# Patient Record
Sex: FEMALE | Race: WHITE | HISPANIC OR LATINO
[De-identification: names, ages, dates, MRNs, and addresses within clinical notes are randomized per-mention and may not be internally consistent; named-entity substitution may affect disease eponyms.]

---

## 2017-02-12 ENCOUNTER — RESULT REVIEW (OUTPATIENT)
Age: 37
End: 2017-02-12

## 2017-09-06 ENCOUNTER — RESULT REVIEW (OUTPATIENT)
Age: 37
End: 2017-09-06

## 2017-11-08 ENCOUNTER — RESULT REVIEW (OUTPATIENT)
Age: 37
End: 2017-11-08

## 2018-02-28 ENCOUNTER — RESULT REVIEW (OUTPATIENT)
Age: 38
End: 2018-02-28

## 2018-03-29 ENCOUNTER — APPOINTMENT (OUTPATIENT)
Dept: PULMONOLOGY | Facility: CLINIC | Age: 38
End: 2018-03-29
Payer: COMMERCIAL

## 2018-03-29 VITALS
SYSTOLIC BLOOD PRESSURE: 128 MMHG | HEIGHT: 59 IN | DIASTOLIC BLOOD PRESSURE: 78 MMHG | OXYGEN SATURATION: 100 % | BODY MASS INDEX: 39.11 KG/M2 | WEIGHT: 194 LBS | HEART RATE: 72 BPM

## 2018-03-29 DIAGNOSIS — B37.0 CANDIDAL STOMATITIS: ICD-10-CM

## 2018-03-29 DIAGNOSIS — R06.00 DYSPNEA, UNSPECIFIED: ICD-10-CM

## 2018-03-29 DIAGNOSIS — Z87.39 PERSONAL HISTORY OF OTHER DISEASES OF THE MUSCULOSKELETAL SYSTEM AND CONNECTIVE TISSUE: ICD-10-CM

## 2018-03-29 DIAGNOSIS — Z82.49 FAMILY HISTORY OF ISCHEMIC HEART DISEASE AND OTHER DISEASES OF THE CIRCULATORY SYSTEM: ICD-10-CM

## 2018-03-29 DIAGNOSIS — Z83.49 FAMILY HISTORY OF OTHER ENDOCRINE, NUTRITIONAL AND METABOLIC DISEASES: ICD-10-CM

## 2018-03-29 DIAGNOSIS — Z86.79 PERSONAL HISTORY OF OTHER DISEASES OF THE CIRCULATORY SYSTEM: ICD-10-CM

## 2018-03-29 DIAGNOSIS — Z87.09 PERSONAL HISTORY OF OTHER DISEASES OF THE RESPIRATORY SYSTEM: ICD-10-CM

## 2018-03-29 DIAGNOSIS — G47.10 HYPERSOMNIA, UNSPECIFIED: ICD-10-CM

## 2018-03-29 DIAGNOSIS — Z87.448 PERSONAL HISTORY OF OTHER DISEASES OF URINARY SYSTEM: ICD-10-CM

## 2018-03-29 DIAGNOSIS — G47.00 INSOMNIA, UNSPECIFIED: ICD-10-CM

## 2018-03-29 DIAGNOSIS — Z87.728 PERSONAL HISTORY OF OTHER SPECIFIED (CORRECTED) CONGENITAL MALFORMATIONS OF NERVOUS SYSTEM AND SENSE ORGANS: ICD-10-CM

## 2018-03-29 DIAGNOSIS — Z83.3 FAMILY HISTORY OF DIABETES MELLITUS: ICD-10-CM

## 2018-03-29 PROCEDURE — 94727 GAS DIL/WSHOT DETER LNG VOL: CPT

## 2018-03-29 PROCEDURE — 94060 EVALUATION OF WHEEZING: CPT

## 2018-03-29 PROCEDURE — 99204 OFFICE O/P NEW MOD 45 MIN: CPT | Mod: 25

## 2018-03-29 PROCEDURE — 94729 DIFFUSING CAPACITY: CPT

## 2018-03-29 RX ORDER — TACROLIMUS 1 MG/1
1 CAPSULE ORAL
Refills: 0 | Status: ACTIVE | COMMUNITY

## 2018-03-29 RX ORDER — CHLORHEXIDINE GLUCONATE 4 %
325 (65 FE) LIQUID (ML) TOPICAL
Refills: 0 | Status: ACTIVE | COMMUNITY

## 2018-03-29 RX ORDER — LOSARTAN POTASSIUM AND HYDROCHLOROTHIAZIDE 25; 100 MG/1; MG/1
100-25 TABLET ORAL
Refills: 0 | Status: ACTIVE | COMMUNITY

## 2018-03-29 RX ORDER — DARBEPOETIN ALFA IN ALBUMN SOL 100MCG/0.5
100 SYRINGE (ML) INJECTION
Refills: 0 | Status: ACTIVE | COMMUNITY

## 2018-03-29 RX ORDER — ASPIRIN 81 MG
81 TABLET, DELAYED RELEASE (ENTERIC COATED) ORAL
Refills: 0 | Status: ACTIVE | COMMUNITY

## 2018-03-29 RX ORDER — MYCOPHENOLATE MOFETIL 500 MG/1
500 TABLET ORAL TWICE DAILY
Refills: 0 | Status: ACTIVE | COMMUNITY

## 2018-03-29 RX ORDER — ASCORBIC ACID 500 MG
500 TABLET ORAL
Refills: 0 | Status: ACTIVE | COMMUNITY

## 2018-03-29 RX ORDER — MULTIVIT-MIN/FOLIC/VIT K/LYCOP 400-300MCG
25 MCG TABLET ORAL
Refills: 0 | Status: ACTIVE | COMMUNITY

## 2018-03-29 RX ORDER — OXYBUTYNIN CHLORIDE 15 MG/1
15 TABLET, EXTENDED RELEASE ORAL
Refills: 0 | Status: ACTIVE | COMMUNITY

## 2018-03-29 RX ORDER — FLUCONAZOLE 100 MG/1
100 TABLET ORAL
Qty: 2 | Refills: 0 | Status: ACTIVE | COMMUNITY
Start: 2018-03-29 | End: 1900-01-01

## 2018-03-29 RX ORDER — NIFEDIPINE 60 MG
60 TABLET, EXTENDED RELEASE ORAL
Refills: 0 | Status: ACTIVE | COMMUNITY

## 2018-03-29 RX ORDER — DOCUSATE SODIUM 100 MG/1
100 CAPSULE, LIQUID FILLED ORAL
Refills: 0 | Status: ACTIVE | COMMUNITY

## 2018-03-29 RX ORDER — METOPROLOL SUCCINATE 100 MG/1
100 TABLET, EXTENDED RELEASE ORAL
Refills: 0 | Status: ACTIVE | COMMUNITY

## 2018-03-29 RX ORDER — METHENAMINE HIPPURATE 1 G/1
TABLET ORAL TWICE DAILY
Refills: 0 | Status: ACTIVE | COMMUNITY

## 2018-03-29 RX ORDER — SODIUM BICARBONATE 650 MG/1
650 TABLET ORAL
Refills: 0 | Status: ACTIVE | COMMUNITY

## 2018-05-08 ENCOUNTER — APPOINTMENT (OUTPATIENT)
Dept: PULMONOLOGY | Facility: CLINIC | Age: 38
End: 2018-05-08

## 2018-05-21 ENCOUNTER — APPOINTMENT (OUTPATIENT)
Dept: PULMONOLOGY | Facility: CLINIC | Age: 38
End: 2018-05-21

## 2018-09-24 ENCOUNTER — APPOINTMENT (OUTPATIENT)
Dept: PULMONOLOGY | Facility: CLINIC | Age: 38
End: 2018-09-24

## 2019-09-19 ENCOUNTER — OUTPATIENT (OUTPATIENT)
Dept: OUTPATIENT SERVICES | Facility: HOSPITAL | Age: 39
LOS: 1 days | Discharge: HOME | End: 2019-09-19

## 2020-01-19 ENCOUNTER — INPATIENT (INPATIENT)
Facility: HOSPITAL | Age: 40
LOS: 3 days | Discharge: HOME | DRG: 40 | End: 2020-01-23
Attending: NEUROLOGICAL SURGERY | Admitting: NEUROLOGICAL SURGERY
Payer: COMMERCIAL

## 2020-01-19 VITALS
OXYGEN SATURATION: 96 % | DIASTOLIC BLOOD PRESSURE: 84 MMHG | SYSTOLIC BLOOD PRESSURE: 129 MMHG | RESPIRATION RATE: 18 BRPM | TEMPERATURE: 98 F | HEART RATE: 84 BPM

## 2020-01-19 DIAGNOSIS — G91.9 HYDROCEPHALUS, UNSPECIFIED: ICD-10-CM

## 2020-01-19 DIAGNOSIS — Z98.2 PRESENCE OF CEREBROSPINAL FLUID DRAINAGE DEVICE: Chronic | ICD-10-CM

## 2020-01-19 LAB
ALBUMIN SERPL ELPH-MCNC: 4.4 G/DL — SIGNIFICANT CHANGE UP (ref 3.3–5)
ALP SERPL-CCNC: 87 U/L — SIGNIFICANT CHANGE UP (ref 40–120)
ALT FLD-CCNC: 11 U/L — SIGNIFICANT CHANGE UP (ref 10–45)
ANION GAP SERPL CALC-SCNC: 20 MMOL/L — HIGH (ref 5–17)
APPEARANCE CSF: ABNORMAL
APPEARANCE SPUN FLD: COLORLESS — SIGNIFICANT CHANGE UP
APTT BLD: 31.7 SEC — SIGNIFICANT CHANGE UP (ref 27.5–36.3)
AST SERPL-CCNC: <5 U/L — LOW (ref 10–40)
BACTERIAL AG PNL SER: 1 % — SIGNIFICANT CHANGE UP
BASE EXCESS BLDV CALC-SCNC: 4.1 MMOL/L — HIGH (ref -2–2)
BASOPHILS # BLD AUTO: 0.14 K/UL — SIGNIFICANT CHANGE UP (ref 0–0.2)
BASOPHILS NFR BLD AUTO: 1.4 % — SIGNIFICANT CHANGE UP (ref 0–2)
BILIRUB SERPL-MCNC: 0.2 MG/DL — SIGNIFICANT CHANGE UP (ref 0.2–1.2)
BLD GP AB SCN SERPL QL: NEGATIVE — SIGNIFICANT CHANGE UP
BUN SERPL-MCNC: 34 MG/DL — HIGH (ref 7–23)
CA-I SERPL-SCNC: 1.05 MMOL/L — LOW (ref 1.12–1.3)
CALCIUM SERPL-MCNC: 9.1 MG/DL — SIGNIFICANT CHANGE UP (ref 8.4–10.5)
CHLORIDE BLDV-SCNC: 103 MMOL/L — SIGNIFICANT CHANGE UP (ref 96–108)
CHLORIDE SERPL-SCNC: 95 MMOL/L — LOW (ref 96–108)
CO2 BLDV-SCNC: 30 MMOL/L — SIGNIFICANT CHANGE UP (ref 22–30)
CO2 SERPL-SCNC: 25 MMOL/L — SIGNIFICANT CHANGE UP (ref 22–31)
COLOR CSF: SIGNIFICANT CHANGE UP
CREAT SERPL-MCNC: 5.96 MG/DL — HIGH (ref 0.5–1.3)
EOSINOPHIL # BLD AUTO: 1.92 K/UL — HIGH (ref 0–0.5)
EOSINOPHIL # CSF: 9 % — SIGNIFICANT CHANGE UP
EOSINOPHIL NFR BLD AUTO: 18.6 % — HIGH (ref 0–6)
GAS PNL BLDV: 138 MMOL/L — SIGNIFICANT CHANGE UP (ref 135–145)
GAS PNL BLDV: SIGNIFICANT CHANGE UP
GAS PNL BLDV: SIGNIFICANT CHANGE UP
GLUCOSE BLDV-MCNC: 85 MG/DL — SIGNIFICANT CHANGE UP (ref 70–99)
GLUCOSE CSF-MCNC: 60 MG/DL — SIGNIFICANT CHANGE UP (ref 40–70)
GLUCOSE SERPL-MCNC: 85 MG/DL — SIGNIFICANT CHANGE UP (ref 70–99)
GRAM STN FLD: SIGNIFICANT CHANGE UP
HCO3 BLDV-SCNC: 29 MMOL/L — SIGNIFICANT CHANGE UP (ref 21–29)
HCT VFR BLD CALC: 33 % — LOW (ref 34.5–45)
HCT VFR BLDA CALC: 32 % — LOW (ref 39–50)
HGB BLD CALC-MCNC: 10.3 G/DL — LOW (ref 11.5–15.5)
HGB BLD-MCNC: 10.2 G/DL — LOW (ref 11.5–15.5)
IMM GRANULOCYTES NFR BLD AUTO: 0.5 % — SIGNIFICANT CHANGE UP (ref 0–1.5)
INR BLD: 1.2 RATIO — HIGH (ref 0.88–1.16)
LACTATE BLDV-MCNC: 1.4 MMOL/L — SIGNIFICANT CHANGE UP (ref 0.7–2)
LACTATE CSF-MCNC: 1.9 MMOL/L — SIGNIFICANT CHANGE UP (ref 1.1–2.4)
LYMPHOCYTES # BLD AUTO: 19.8 % — SIGNIFICANT CHANGE UP (ref 13–44)
LYMPHOCYTES # BLD AUTO: 2.05 K/UL — SIGNIFICANT CHANGE UP (ref 1–3.3)
LYMPHOCYTES # CSF: 32 % — LOW (ref 40–80)
MAGNESIUM SERPL-MCNC: 2.6 MG/DL — SIGNIFICANT CHANGE UP (ref 1.6–2.6)
MCHC RBC-ENTMCNC: 30.4 PG — SIGNIFICANT CHANGE UP (ref 27–34)
MCHC RBC-ENTMCNC: 30.9 GM/DL — LOW (ref 32–36)
MCV RBC AUTO: 98.5 FL — SIGNIFICANT CHANGE UP (ref 80–100)
MONOCYTES # BLD AUTO: 0.7 K/UL — SIGNIFICANT CHANGE UP (ref 0–0.9)
MONOCYTES NFR BLD AUTO: 6.8 % — SIGNIFICANT CHANGE UP (ref 2–14)
MONOS+MACROS NFR CSF: 6 % — LOW (ref 15–45)
NEUTROPHILS # BLD AUTO: 5.48 K/UL — SIGNIFICANT CHANGE UP (ref 1.8–7.4)
NEUTROPHILS # CSF: 52 % — HIGH (ref 0–6)
NEUTROPHILS NFR BLD AUTO: 52.9 % — SIGNIFICANT CHANGE UP (ref 43–77)
NRBC # BLD: 0 /100 WBCS — SIGNIFICANT CHANGE UP (ref 0–0)
NRBC NFR CSF: 1 % — HIGH (ref 0–0)
NRBC NFR CSF: 1 /UL — SIGNIFICANT CHANGE UP (ref 0–5)
PA ADP PRP-ACNC: 242 PRU — SIGNIFICANT CHANGE UP (ref 194–417)
PCO2 BLDV: 48 MMHG — SIGNIFICANT CHANGE UP (ref 35–50)
PH BLDV: 7.4 — SIGNIFICANT CHANGE UP (ref 7.35–7.45)
PHOSPHATE SERPL-MCNC: 5.4 MG/DL — HIGH (ref 2.5–4.5)
PLATELET # BLD AUTO: 331 K/UL — SIGNIFICANT CHANGE UP (ref 150–400)
PLATELET RESPONSE ASPIRIN RESULT: 492 ARU — SIGNIFICANT CHANGE UP (ref 350–700)
PO2 BLDV: 45 MMHG — SIGNIFICANT CHANGE UP (ref 25–45)
POTASSIUM BLDV-SCNC: 5.2 MMOL/L — SIGNIFICANT CHANGE UP (ref 3.5–5.3)
POTASSIUM SERPL-MCNC: 5.2 MMOL/L — SIGNIFICANT CHANGE UP (ref 3.5–5.3)
POTASSIUM SERPL-SCNC: 5.2 MMOL/L — SIGNIFICANT CHANGE UP (ref 3.5–5.3)
PROT CSF-MCNC: 12 MG/DL — LOW (ref 15–45)
PROT SERPL-MCNC: 8 G/DL — SIGNIFICANT CHANGE UP (ref 6–8.3)
PROTHROM AB SERPL-ACNC: 13.8 SEC — HIGH (ref 10–12.9)
RBC # BLD: 3.35 M/UL — LOW (ref 3.8–5.2)
RBC # CSF: 1350 /UL — HIGH (ref 0–0)
RBC # FLD: 14.7 % — HIGH (ref 10.3–14.5)
RH IG SCN BLD-IMP: POSITIVE — SIGNIFICANT CHANGE UP
SAO2 % BLDV: 76 % — SIGNIFICANT CHANGE UP (ref 67–88)
SODIUM SERPL-SCNC: 140 MMOL/L — SIGNIFICANT CHANGE UP (ref 135–145)
SPECIMEN SOURCE: SIGNIFICANT CHANGE UP
TUBE TYPE: SIGNIFICANT CHANGE UP
WBC # BLD: 10.34 K/UL — SIGNIFICANT CHANGE UP (ref 3.8–10.5)
WBC # FLD AUTO: 10.34 K/UL — SIGNIFICANT CHANGE UP (ref 3.8–10.5)

## 2020-01-19 PROCEDURE — 99291 CRITICAL CARE FIRST HOUR: CPT

## 2020-01-19 PROCEDURE — 70250 X-RAY EXAM OF SKULL: CPT | Mod: 26

## 2020-01-19 PROCEDURE — 71045 X-RAY EXAM CHEST 1 VIEW: CPT | Mod: 26

## 2020-01-19 PROCEDURE — 74018 RADEX ABDOMEN 1 VIEW: CPT | Mod: 26

## 2020-01-19 PROCEDURE — 70450 CT HEAD/BRAIN W/O DYE: CPT | Mod: 26

## 2020-01-19 RX ORDER — SODIUM CHLORIDE 9 MG/ML
1000 INJECTION INTRAMUSCULAR; INTRAVENOUS; SUBCUTANEOUS
Refills: 0 | Status: DISCONTINUED | OUTPATIENT
Start: 2020-01-19 | End: 2020-01-19

## 2020-01-19 RX ORDER — FOLIC ACID 0.8 MG
1 TABLET ORAL DAILY
Refills: 0 | Status: DISCONTINUED | OUTPATIENT
Start: 2020-01-19 | End: 2020-01-23

## 2020-01-19 RX ORDER — CHLORHEXIDINE GLUCONATE 213 G/1000ML
1 SOLUTION TOPICAL
Refills: 0 | Status: DISCONTINUED | OUTPATIENT
Start: 2020-01-19 | End: 2020-01-22

## 2020-01-19 RX ORDER — FERROUS SULFATE 325(65) MG
325 TABLET ORAL DAILY
Refills: 0 | Status: DISCONTINUED | OUTPATIENT
Start: 2020-01-19 | End: 2020-01-23

## 2020-01-19 RX ORDER — METOPROLOL TARTRATE 50 MG
50 TABLET ORAL
Refills: 0 | Status: DISCONTINUED | OUTPATIENT
Start: 2020-01-19 | End: 2020-01-20

## 2020-01-19 RX ORDER — OXYBUTYNIN CHLORIDE 5 MG
5 TABLET ORAL
Refills: 0 | Status: DISCONTINUED | OUTPATIENT
Start: 2020-01-19 | End: 2020-01-23

## 2020-01-19 RX ORDER — PANTOPRAZOLE SODIUM 20 MG/1
40 TABLET, DELAYED RELEASE ORAL
Refills: 0 | Status: DISCONTINUED | OUTPATIENT
Start: 2020-01-19 | End: 2020-01-23

## 2020-01-19 RX ORDER — SENNA PLUS 8.6 MG/1
2 TABLET ORAL AT BEDTIME
Refills: 0 | Status: DISCONTINUED | OUTPATIENT
Start: 2020-01-19 | End: 2020-01-23

## 2020-01-19 RX ORDER — CANGRELOR 50 MG/1
2.85 INJECTION, POWDER, LYOPHILIZED, FOR SOLUTION INTRAVENOUS
Qty: 50 | Refills: 0 | Status: DISCONTINUED | OUTPATIENT
Start: 2020-01-19 | End: 2020-01-22

## 2020-01-19 RX ORDER — ACETAMINOPHEN 500 MG
975 TABLET ORAL ONCE
Refills: 0 | Status: COMPLETED | OUTPATIENT
Start: 2020-01-19 | End: 2020-01-19

## 2020-01-19 RX ADMIN — CANGRELOR 72.99 MICROGRAM(S)/KG/MIN: 50 INJECTION, POWDER, LYOPHILIZED, FOR SOLUTION INTRAVENOUS at 22:38

## 2020-01-19 RX ADMIN — Medication 975 MILLIGRAM(S): at 16:09

## 2020-01-19 RX ADMIN — SODIUM CHLORIDE 75 MILLILITER(S): 9 INJECTION INTRAMUSCULAR; INTRAVENOUS; SUBCUTANEOUS at 18:36

## 2020-01-19 RX ADMIN — CHLORHEXIDINE GLUCONATE 1 APPLICATION(S): 213 SOLUTION TOPICAL at 22:38

## 2020-01-19 NOTE — ED ADULT NURSE NOTE - OBJECTIVE STATEMENT
39yr old female with pmhx of spina bifida, hydrocephalous,  shunt placement, CKD, stage 4 ESRF presents to ED c/o Headache. Pt states she has been experiencing a headache, dizziness, and short term memory loss since yesterday. Pt accompanied by family and they state pt has been vomiting since yesterday while receiving dialysis. has been weak and asking repetitive questions. Nystagmus noted upon neuro assessment, Pupils 3mm, and non reactive to light. Pt asking repetitive questions. Pt states she knows she is asking the same questions, because she cant remember what the answer was. Pt assessed by MD, awaiting for neuro eval. Pts bed lowered, locked Call bell within reach, family at bedside will reassess.

## 2020-01-19 NOTE — ED PROVIDER NOTE - SHIFT CHANGE DETAILS
Attending MD Eng: 39F with history of hydrocephalus with  shunt, now with new acute hydrocephalus, seen by neurosx, pending shunt series and final neurosx recs

## 2020-01-19 NOTE — ED ADULT TRIAGE NOTE - CHIEF COMPLAINT QUOTE
Hx spina bifida, hydrocephalus,  shunt (last revision age 10).  Having dizziness, confusion and headache at  shunt site since yesterday

## 2020-01-19 NOTE — PROGRESS NOTE ADULT - SUBJECTIVE AND OBJECTIVE BOX
Chief complaint:   Patient is a 39y old  Female who presents with a chief complaint of Concern for shunt failure (19 Jan 2020 19:28)    HPI:  39F w/PMHx of spinal bifida, VPS early in life (last revised at the age of 10), ESRD on HD with RUE graft/stent and renal transplant in 2013, who presents with 2 days of HA, n/v, and blurry vision. Head CT shows ventriculomegaly compared to MRI in our system from 2016 with trans-ependymal flow. Of note, she is on ASA/plavix as well as Eliquis due to her dialysis stent graft having multiple episodes of clotting off requiring intervention. (19 Jan 2020 19:28)    Stay Summary:  30cc CSF removed via shunt in ED    ROS: [ ]  Unable to assess due to mental status   All other systems negative    -----------------------------------------------------------------------------------------------------------------------------------------------------------------------------------  ICU Vital Signs Last 24 Hrs  T(C): 37 (19 Jan 2020 18:29), Max: 37 (19 Jan 2020 18:29)  T(F): 98.6 (19 Jan 2020 18:29), Max: 98.6 (19 Jan 2020 18:29)  HR: 80 (19 Jan 2020 18:29) (80 - 84)  BP: 125/76 (19 Jan 2020 18:29) (125/76 - 132/83)  BP(mean): --  ABP: --  ABP(mean): --  RR: 18 (19 Jan 2020 18:29) (16 - 18)  SpO2: 99% (19 Jan 2020 18:29) (96% - 99%)    MEDICATIONS  (STANDING):  chlorhexidine 4% Liquid 1 Application(s) Topical <User Schedule>    NEUROIMAGING:   Recent imaging studies were reviewed.    LAB RESULTS:                          10.2   10.34 )-----------( 331      ( 19 Jan 2020 15:49 )             33.0       PT/INR - ( 19 Jan 2020 15:49 )   PT: 13.8 sec;   INR: 1.20 ratio         PTT - ( 19 Jan 2020 15:49 )  PTT:31.7 sec    01-19    140  |  95<L>  |  34<H>  ----------------------------<  85  5.2   |  25  |  5.96<H>    Ca    9.1      19 Jan 2020 15:49    TPro  8.0  /  Alb  4.4  /  TBili  0.2  /  DBili  x   /  AST  <5<L>  /  ALT  11  /  AlkPhos  87  01-19      -----------------------------------------------------------------------------------------------------------------------------------------------------------------------------------    PHYSICAL EXAM:  General: Calm, laying in bed  HEENT: MMM  Neuro:  -Mental status- No acute distress, AOx3, conversational, following commands  -CN- PERRL 3mm, EOMI, tongue midline, face symmetric  -Motor- full strength in all ext  -Sensation- intact to LT   -Coordination- no dysmetria noted    CV: RRR  Pulm: Clear to auscultation  Abd: Soft, nontender, nondistended  Ext: No edema  Skin: warm, dry Chief complaint:   Patient is a 39y old  Female who presents with a chief complaint of Concern for shunt failure (19 Jan 2020 19:28)    HPI:  39F w/PMHx of spinal bifida, VPS early in life (last revised at the age of 10), ESRD on HD with RUE graft/stent and renal transplant in 2013, who presents with 2 days of HA, n/v, and blurry vision. Head CT shows ventriculomegaly compared to MRI in our system from 2016 with trans-ependymal flow. Of note, she is on ASA/plavix as well as Eliquis due to her dialysis stent graft having multiple episodes of clotting off requiring intervention. (19 Jan 2020 19:28)    Stay Summary:  30cc CSF removed via shunt in ED    ROS: improved headache after CSF removal  All other systems negative    -----------------------------------------------------------------------------------------------------------------------------------------------------------------------------------  ICU Vital Signs Last 24 Hrs  T(C): 37 (19 Jan 2020 18:29), Max: 37 (19 Jan 2020 18:29)  T(F): 98.6 (19 Jan 2020 18:29), Max: 98.6 (19 Jan 2020 18:29)  HR: 80 (19 Jan 2020 18:29) (80 - 84)  BP: 125/76 (19 Jan 2020 18:29) (125/76 - 132/83)  BP(mean): --  ABP: --  ABP(mean): --  RR: 18 (19 Jan 2020 18:29) (16 - 18)  SpO2: 99% (19 Jan 2020 18:29) (96% - 99%)    MEDICATIONS  (STANDING):  chlorhexidine 4% Liquid 1 Application(s) Topical <User Schedule>    NEUROIMAGING:   Recent imaging studies were reviewed.    LAB RESULTS:                          10.2   10.34 )-----------( 331      ( 19 Jan 2020 15:49 )             33.0       PT/INR - ( 19 Jan 2020 15:49 )   PT: 13.8 sec;   INR: 1.20 ratio         PTT - ( 19 Jan 2020 15:49 )  PTT:31.7 sec    01-19    140  |  95<L>  |  34<H>  ----------------------------<  85  5.2   |  25  |  5.96<H>    Ca    9.1      19 Jan 2020 15:49    TPro  8.0  /  Alb  4.4  /  TBili  0.2  /  DBili  x   /  AST  <5<L>  /  ALT  11  /  AlkPhos  87  01-19      -----------------------------------------------------------------------------------------------------------------------------------------------------------------------------------    PHYSICAL EXAM:  General: Calm, laying in bed  HEENT: MMM  Neuro:  -Mental status- No acute distress, AOx3, conversational, following commands  -CN- PERRL 3mm, EOMI, tongue midline, face symmetric, blurry vision with up and downgaze, vertical nystagmus with upgaze, R>L horizontal nystagmus  -Motor- full strength in all ext  -Sensation- intact to LT   -Coordination- no dysmetria noted    CV: RRR  Pulm: Clear to auscultation  Abd: Soft, nontender, nondistended  Ext: No edema  Skin: warm, dry

## 2020-01-19 NOTE — ED PROVIDER NOTE - PROGRESS NOTE DETAILS
D/w neurosurgery resident - will obtain imagine w/ imaging. Team aware of pt will see in ED Pt has new acute hydrocephalus to lateral/3rd ventricle on CT. D/w neurosurgery team is following pt. D/w XR tech will bring pt over for shunt series. Pt neuro exam intact. D/w neurosurgery resident at bedside - will admit to neuro ICU under Dr Dos Santos Attending MD Eng: Admitted to Neuro ICU

## 2020-01-19 NOTE — ED PROVIDER NOTE - OBJECTIVE STATEMENT
39y f PMHX hydrocephalus with  shunt, spina bifida, ESRD on dialysis s/p R transplant 2013 p/w HA/confusion/dizziness. As per pts family, she has had worsening HA with dizziness, confusion and vision changes x2 days. She describes her vision changes as "fuzzy" and "double" and admits to HA and dizziness, stating the room is spinning. No head trauma/LOC. Pt had  shunt revision last at age 10, used to follow neurosurgery Dr Sorenson, now sees Yves. Pt uses wheelchair and able to ambulate with assistance at home, lives with family who are at bedside. Pt denies LOC, head trauma, fever, chills, weakness, numbness, or slurred speech.

## 2020-01-19 NOTE — H&P ADULT - ASSESSMENT
39F w/hx of ESRD on HD with renal tx in 2013, VPS last revised at the age of 10, spina bifida, and ASA/plvx/Eliquis use 2/2 previous episodes of dialysis stent clotting who presents with clinical and radiographic evidence of shunt failure    Plan:  - Shunt series obtained, showing no signs of obvious tubing disconnection or kinking  - Bedside shunt tap performed, with 30cc removed and sent to the lab. Subjective symptom improvement after shunt tap  - ICU admission for close observation  - Contacted Dr. Quinones (patient's Vascular surgeon) who explained patient's complex history of multiple episodes of graft stent clotting and the need for ASA/plavix/Eliquis  - ARU and P2Y12 labs pending to determine whether patient is therapeutic on these meds given reported hx of non-compliance   - Per [facility] transplant office, the patient is on the re-transplant list due to non-compliance   - Will perform serial shunt taps PRN until safe surgical decision can be determined

## 2020-01-19 NOTE — ED PROVIDER NOTE - ATTENDING CONTRIBUTION TO CARE
39F w/ hydrocephalus with  shunt, spina bifida, ESRD on HD s/p renal transplant 2013; w/ increased HA/confusion and vision changes x 2 days, states that her vision is fuzzy and double vision, denies any exacerbating/alleviating factors, states that she has never had these symptoms before. Last had shunt revision at age 10, used to follow Dr. Sorenson NSx but now sees Dr. Marinelli. Patient uses wheelchair and able to ambulate with assistance at home and lives with family who are bedside.    PHYSICAL EXAMINATION:  VITALS REVIEWED.  VS normal  GENERALIZED APPEARANCE:  Comfortable, no acute distress, ambulating without difficulty  SKIN:  Warm, dry, no cyanosis  HEAD:  No obvious scalp lesions  EYES:  Conjunctiva pink, no icterus, pupils 2mm, equal, nonreactive. + left horizontal nystagmus  ENMT:  Mucus membranes moist, no stridor  NECK:  Supple, non-tender  CHEST AND RESPIRATORY:  Clear to auscultation B/L, good air entry B/L, equal chest expansion  HEART AND CARDIOVASCULAR:  Regular rate, no obvious murmur  ABDOMEN AND GI:  Soft, non-tender, non-distended.  No rebound, no guarding, no CVA-area tenderness  EXTREMITIES:  No deformity, edema, or calf tenderness  NEURO: AAOx3, gross motor and sensory intact, 5/5 strength in all four extremities except for distal b/l LEs (baseline). Right posterior cranial incision C/D/I with palpable shunt valve. Three previous surgical incisions visualized.  PSYCH: Normal affect     Impression:  R/o worsening hydrocephalus, r/o bleed; labs, imaging, neurosurgery consult, monitoring, neurochecks, re-eval, admit

## 2020-01-19 NOTE — H&P ADULT - NSHPPHYSICALEXAM_GEN_ALL_CORE
Awake, alert, oriented x4. PERRL, EOMI except for upward gaze palsy and decreased visual acuity b/l. Face symmetric, facial sensation intact bilaterally. 5/5 strength in all four extremities except for distal b/l LEs (baseline). Right posterior cranial incision c/d/i with palpable shunt valve. Abdomen soft, NT, ND. Three previous surgical incisions visualized.

## 2020-01-19 NOTE — ED ADULT NURSE REASSESSMENT NOTE - NS ED NURSE REASSESS COMMENT FT1
NSCU resident came to transport pt to unit. pt in stable condition upon exiting the ED, per NSCU resident cardiac monitor not necessary

## 2020-01-19 NOTE — ED ADULT NURSE NOTE - NSIMPLEMENTINTERV_GEN_ALL_ED
Implemented All Fall Risk Interventions:  [place] to call system. Call bell, personal items and telephone within reach. Instruct patient to call for assistance. Room bathroom lighting operational. Non-slip footwear when patient is off stretcher. Physically safe environment: no spills, clutter or unnecessary equipment. Stretcher in lowest position, wheels locked, appropriate side rails in place. Provide visual cue, wrist band, yellow gown, etc. Monitor gait and stability. Monitor for mental status changes and reorient to person, place, and time. Review medications for side effects contributing to fall risk. Reinforce activity limits and safety measures with patient and family.

## 2020-01-19 NOTE — H&P ADULT - HISTORY OF PRESENT ILLNESS
39F w/PMHx of spinal bifida, VPS early in life (last revised at the age of 10), ESRD on HD with RUE graft/stent and renal transplant in 2013, who presents with 2 days of HA, n/v, and blurry vision. Head CT shows ventriculomegaly compared to MRI in our system from 2016 with trans-ependymal flow. Of note, she is on ASA/plavix as well as Eliquis due to her dialysis stent graft having multiple episodes of clotting off requiring intervention.

## 2020-01-19 NOTE — CHART NOTE - NSCHARTNOTEFT_GEN_A_CORE
CAPRINI SCORE [CLOT] Score on Admission for     AGE RELATED RISK FACTORS                                                       MOBILITY RELATED FACTORS  [X ] Age 41-60 years                                            (1 Point)                  [ ] Bed rest                                                        (1 Point)  [ ] Age: 61-74 years                                           (2 Points)                 [ ] Plaster cast                                                   (2 Points)  [ ] Age= 75 years                                              (3 Points)                 [ ] Bed bound for more than 72 hours                 (2 Points)    DISEASE RELATED RISK FACTORS                                               GENDER SPECIFIC FACTORS  [ ] Edema in the lower extremities                       (1 Point)                  [ ] Pregnancy                                                     (1 Point)  [ ] Varicose veins                                               (1 Point)                  [ ] Post-partum < 6 weeks                                   (1 Point)             [X ] BMI > 25 Kg/m2                                            (1 Point)                  [ ] Hormonal therapy  or oral contraception          (1 Point)                 [ ] Sepsis (in the previous month)                        (1 Point)                  [ ] History of pregnancy complications                 (1 point)  [ ] Pneumonia or serious lung disease                                               [ ] Unexplained or recurrent                     (1 Point)           (in the previous month)                               (1 Point)  [ ] Abnormal pulmonary function test                     (1 Point)                 SURGERY RELATED RISK FACTORS (include planned surgeries)  [ ] Acute myocardial infarction                              (1 Point)                 [ ]  Section                                             (1 Point)  [ ] Congestive heart failure (in the previous month)  (1 Point)         [ ] Minor surgery                                                  (1 Point)   [ ] Inflammatory bowel disease                             (1 Point)                 [ ] Arthroscopic surgery                                        (2 Points)  [ ] Central venous access                                      (2 Points)                [X ] General surgery lasting more than 45 minutes   (2 Points)       [ ] Stroke (in the previous month)                          (5 Points)               [ ] Elective arthroplasty                                         (5 Points)            [ ] current or past malignancy                              (2 Points)                                                                                                       HEMATOLOGY RELATED FACTORS                                                 TRAUMA RELATED RISK FACTORS  [ ] Prior episodes of VTE                                     (3 Points)                [ ] Fracture of the hip, pelvis, or leg                       (5 Points)  [ ] Positive family history for VTE                         (3 Points)                 [ ] Acute spinal cord injury (in the previous month)  (5 Points)  [ ] Prothrombin 85616 A                                     (3 Points)                 [ ] Paralysis  (less than 1 month)                             (5 Points)  [ ] Factor V Leiden                                             (3 Points)                  [ ] Multiple Trauma within 1 month                        (5 Points)  [ ] Lupus anticoagulants                                     (3 Points)                                                           [ ] Anticardiolipin antibodies                               (3 Points)                                                       [ ] High homocysteine in the blood                      (3 Points)                                             [ ] Other congenital or acquired thrombophilia      (3 Points)                                                [ ] Heparin induced thrombocytopenia                  (3 Points)                                          Total Score [  4        ]    Risk:  Very low 0   Low 1 to 2   Moderate 3 to 4   High =5       VTE Prophylaxis Recommendations:  [ x] mechanical pneumatic compression devices                                      [ ] contraindicated: _____________________  [ ] chemo prophylaxis                                                                                   [X ] contraindicated _Possible OR____________________    **** HIGH LIKELIHOOD DVT PRESENT ON ADMISSION  [ ] (please order LE dopplers within 24 hours of admission) CAPRINI SCORE [CLOT] Score on Admission for     AGE RELATED RISK FACTORS                                                       MOBILITY RELATED FACTORS  [X ] Age 41-60 years                                            (1 Point)                  [ ] Bed rest                                                        (1 Point)  [ ] Age: 61-74 years                                           (2 Points)                 [ ] Plaster cast                                                   (2 Points)  [ ] Age= 75 years                                              (3 Points)                 [ ] Bed bound for more than 72 hours                 (2 Points)    DISEASE RELATED RISK FACTORS                                               GENDER SPECIFIC FACTORS  [ ] Edema in the lower extremities                       (1 Point)                  [ ] Pregnancy                                                     (1 Point)  [ ] Varicose veins                                               (1 Point)                  [ ] Post-partum < 6 weeks                                   (1 Point)             [X ] BMI > 25 Kg/m2                                            (1 Point)                  [ ] Hormonal therapy  or oral contraception          (1 Point)                 [ ] Sepsis (in the previous month)                        (1 Point)                  [ ] History of pregnancy complications                 (1 point)  [ ] Pneumonia or serious lung disease                                               [ ] Unexplained or recurrent                     (1 Point)           (in the previous month)                               (1 Point)  [ ] Abnormal pulmonary function test                     (1 Point)                 SURGERY RELATED RISK FACTORS (include planned surgeries)  [ ] Acute myocardial infarction                              (1 Point)                 [ ]  Section                                             (1 Point)  [ ] Congestive heart failure (in the previous month)  (1 Point)         [ ] Minor surgery                                                  (1 Point)   [ ] Inflammatory bowel disease                             (1 Point)                 [ ] Arthroscopic surgery                                        (2 Points)  [ ] Central venous access                                      (2 Points)                [X ] General surgery lasting more than 45 minutes   (2 Points)       [ ] Stroke (in the previous month)                          (5 Points)               [ ] Elective arthroplasty                                         (5 Points)            [ ] current or past malignancy                              (2 Points)                                                                                                       HEMATOLOGY RELATED FACTORS                                                 TRAUMA RELATED RISK FACTORS  [ ] Prior episodes of VTE                                     (3 Points)                [ ] Fracture of the hip, pelvis, or leg                       (5 Points)  [ ] Positive family history for VTE                         (3 Points)                 [ ] Acute spinal cord injury (in the previous month)  (5 Points)  [ ] Prothrombin 22558 A                                     (3 Points)                 [ ] Paralysis  (less than 1 month)                             (5 Points)  [ ] Factor V Leiden                                             (3 Points)                  [ ] Multiple Trauma within 1 month                        (5 Points)  [ ] Lupus anticoagulants                                     (3 Points)                                                           [ ] Anticardiolipin antibodies                               (3 Points)                                                       [ ] High homocysteine in the blood                      (3 Points)                                             [ ] Other congenital or acquired thrombophilia      (3 Points)                                                [ ] Heparin induced thrombocytopenia                  (3 Points)                                          Total Score [  4        ]    Risk:  Very low 0   Low 1 to 2   Moderate 3 to 4   High =5       VTE Prophylaxis Recommendations:  [ x] mechanical pneumatic compression devices                                      [ ] contraindicated: _____________________  [ ] chemo prophylaxis                                                                                   [X ] contraindicated _Possible OR____________________    **** HIGH LIKELIHOOD DVT PRESENT ON ADMISSION  [ X] (please order LE dopplers within 24 hours of admission)

## 2020-01-20 LAB
ANION GAP SERPL CALC-SCNC: 12 MMOL/L — SIGNIFICANT CHANGE UP (ref 5–17)
ANION GAP SERPL CALC-SCNC: 14 MMOL/L — SIGNIFICANT CHANGE UP (ref 5–17)
BUN SERPL-MCNC: 12 MG/DL — SIGNIFICANT CHANGE UP (ref 7–23)
BUN SERPL-MCNC: 33 MG/DL — HIGH (ref 7–23)
CALCIUM SERPL-MCNC: 7.6 MG/DL — LOW (ref 8.4–10.5)
CALCIUM SERPL-MCNC: 8.5 MG/DL — SIGNIFICANT CHANGE UP (ref 8.4–10.5)
CALCIUM SERPL-MCNC: 8.5 MG/DL — SIGNIFICANT CHANGE UP (ref 8.4–10.5)
CHLORIDE SERPL-SCNC: 102 MMOL/L — SIGNIFICANT CHANGE UP (ref 96–108)
CHLORIDE SERPL-SCNC: 107 MMOL/L — SIGNIFICANT CHANGE UP (ref 96–108)
CO2 SERPL-SCNC: 24 MMOL/L — SIGNIFICANT CHANGE UP (ref 22–31)
CO2 SERPL-SCNC: 24 MMOL/L — SIGNIFICANT CHANGE UP (ref 22–31)
CREAT SERPL-MCNC: 2.7 MG/DL — HIGH (ref 0.5–1.3)
CREAT SERPL-MCNC: 5.18 MG/DL — HIGH (ref 0.5–1.3)
GLUCOSE SERPL-MCNC: 101 MG/DL — HIGH (ref 70–99)
GLUCOSE SERPL-MCNC: 109 MG/DL — HIGH (ref 70–99)
HBV CORE AB SER-ACNC: SIGNIFICANT CHANGE UP
HBV SURFACE AB SER-ACNC: 638.2 MIU/ML — SIGNIFICANT CHANGE UP
HBV SURFACE AB SER-ACNC: REACTIVE
HBV SURFACE AG SER-ACNC: SIGNIFICANT CHANGE UP
HCG UR QL: NEGATIVE — SIGNIFICANT CHANGE UP
HCT VFR BLD CALC: 24.7 % — LOW (ref 34.5–45)
HCV AB S/CO SERPL IA: 0.18 S/CO — SIGNIFICANT CHANGE UP (ref 0–0.99)
HCV AB SERPL-IMP: SIGNIFICANT CHANGE UP
HGB BLD-MCNC: 7.7 G/DL — LOW (ref 11.5–15.5)
MAGNESIUM SERPL-MCNC: 1.7 MG/DL — SIGNIFICANT CHANGE UP (ref 1.6–2.6)
MCHC RBC-ENTMCNC: 30.6 PG — SIGNIFICANT CHANGE UP (ref 27–34)
MCHC RBC-ENTMCNC: 31.2 GM/DL — LOW (ref 32–36)
MCV RBC AUTO: 98 FL — SIGNIFICANT CHANGE UP (ref 80–100)
NRBC # BLD: 0 /100 WBCS — SIGNIFICANT CHANGE UP (ref 0–0)
PA ADP PRP-ACNC: 88 PRU — LOW (ref 194–417)
PA ADP PRP-ACNC: 98 PRU — LOW (ref 194–417)
PHOSPHATE SERPL-MCNC: 2.4 MG/DL — LOW (ref 2.5–4.5)
PHOSPHATE SERPL-MCNC: 3.3 MG/DL — SIGNIFICANT CHANGE UP (ref 2.5–4.5)
PLATELET # BLD AUTO: 247 K/UL — SIGNIFICANT CHANGE UP (ref 150–400)
POTASSIUM SERPL-MCNC: 2.8 MMOL/L — CRITICAL LOW (ref 3.5–5.3)
POTASSIUM SERPL-MCNC: 3.8 MMOL/L — SIGNIFICANT CHANGE UP (ref 3.5–5.3)
POTASSIUM SERPL-SCNC: 2.8 MMOL/L — CRITICAL LOW (ref 3.5–5.3)
POTASSIUM SERPL-SCNC: 3.8 MMOL/L — SIGNIFICANT CHANGE UP (ref 3.5–5.3)
PTH-INTACT FLD-MCNC: 334 PG/ML — HIGH (ref 15–65)
RBC # BLD: 2.52 M/UL — LOW (ref 3.8–5.2)
RBC # FLD: 14.5 % — SIGNIFICANT CHANGE UP (ref 10.3–14.5)
SODIUM SERPL-SCNC: 140 MMOL/L — SIGNIFICANT CHANGE UP (ref 135–145)
SODIUM SERPL-SCNC: 143 MMOL/L — SIGNIFICANT CHANGE UP (ref 135–145)
WBC # BLD: 8.69 K/UL — SIGNIFICANT CHANGE UP (ref 3.8–10.5)
WBC # FLD AUTO: 8.69 K/UL — SIGNIFICANT CHANGE UP (ref 3.8–10.5)

## 2020-01-20 PROCEDURE — 99291 CRITICAL CARE FIRST HOUR: CPT

## 2020-01-20 PROCEDURE — 99292 CRITICAL CARE ADDL 30 MIN: CPT

## 2020-01-20 PROCEDURE — 93306 TTE W/DOPPLER COMPLETE: CPT | Mod: 26

## 2020-01-20 PROCEDURE — 70450 CT HEAD/BRAIN W/O DYE: CPT | Mod: 26

## 2020-01-20 RX ORDER — POLYETHYLENE GLYCOL 3350 17 G/17G
17 POWDER, FOR SOLUTION ORAL EVERY 12 HOURS
Refills: 0 | Status: DISCONTINUED | OUTPATIENT
Start: 2020-01-20 | End: 2020-01-23

## 2020-01-20 RX ORDER — ACETAMINOPHEN 500 MG
650 TABLET ORAL EVERY 6 HOURS
Refills: 0 | Status: DISCONTINUED | OUTPATIENT
Start: 2020-01-20 | End: 2020-01-23

## 2020-01-20 RX ORDER — METOPROLOL TARTRATE 50 MG
50 TABLET ORAL EVERY 12 HOURS
Refills: 0 | Status: DISCONTINUED | OUTPATIENT
Start: 2020-01-20 | End: 2020-01-23

## 2020-01-20 RX ORDER — POTASSIUM CHLORIDE 20 MEQ
40 PACKET (EA) ORAL ONCE
Refills: 0 | Status: COMPLETED | OUTPATIENT
Start: 2020-01-20 | End: 2020-01-20

## 2020-01-20 RX ORDER — HEPARIN SODIUM 5000 [USP'U]/ML
5000 INJECTION INTRAVENOUS; SUBCUTANEOUS EVERY 12 HOURS
Refills: 0 | Status: DISCONTINUED | OUTPATIENT
Start: 2020-01-20 | End: 2020-01-22

## 2020-01-20 RX ADMIN — CANGRELOR 69.3 MICROGRAM(S)/KG/MIN: 50 INJECTION, POWDER, LYOPHILIZED, FOR SOLUTION INTRAVENOUS at 14:20

## 2020-01-20 RX ADMIN — Medication 50 MILLIGRAM(S): at 05:36

## 2020-01-20 RX ADMIN — Medication 40 MILLIEQUIVALENT(S): at 22:35

## 2020-01-20 RX ADMIN — Medication 325 MILLIGRAM(S): at 12:31

## 2020-01-20 RX ADMIN — PANTOPRAZOLE SODIUM 40 MILLIGRAM(S): 20 TABLET, DELAYED RELEASE ORAL at 06:05

## 2020-01-20 RX ADMIN — Medication 5 MILLIGRAM(S): at 05:36

## 2020-01-20 RX ADMIN — Medication 1 TABLET(S): at 12:31

## 2020-01-20 RX ADMIN — Medication 650 MILLIGRAM(S): at 08:45

## 2020-01-20 RX ADMIN — Medication 5 MILLIGRAM(S): at 17:18

## 2020-01-20 RX ADMIN — Medication 650 MILLIGRAM(S): at 08:10

## 2020-01-20 RX ADMIN — CHLORHEXIDINE GLUCONATE 1 APPLICATION(S): 213 SOLUTION TOPICAL at 21:51

## 2020-01-20 RX ADMIN — Medication 1 MILLIGRAM(S): at 12:31

## 2020-01-20 RX ADMIN — CANGRELOR 69.3 MICROGRAM(S)/KG/MIN: 50 INJECTION, POWDER, LYOPHILIZED, FOR SOLUTION INTRAVENOUS at 20:58

## 2020-01-20 RX ADMIN — HEPARIN SODIUM 5000 UNIT(S): 5000 INJECTION INTRAVENOUS; SUBCUTANEOUS at 17:18

## 2020-01-20 NOTE — CONSULT NOTE ADULT - ASSESSMENT
39F w/PMHx of spinal bifida, VPS early in life (last revised at the age of 10), ESRD on HD with RUE graft/stent and renal transplant in 2013, who presents with 2 days of HA, n/v, and blurry vision. Head CT shows ventriculomegaly compared to MRI in our system from 2016 with trans-ependymal flow. Of note, she is on ASA/plavix as well as Eliquis due to her dialysis stent graft having multiple episodes of clotting off requiring intervention. admitted for  shunt revision.     1- esrd  2- HTN   3- shpt  4- anemia       hd consent obtained witnessed and placed in chart  cont metoprolol 25 mg bid  ?? oxybutynin  trend hb if < 10 start epogen   given eliquis do not recommend intervention for at least 72 hours and given asa/plavix intervention cannot be performed for total of 7 days unless Neurosurgery feels asa/plavix is not a bleeding risk  for this type of neuro intervention   to have intact pth and phos   to have phos   d/w NSCU team

## 2020-01-20 NOTE — PROGRESS NOTE ADULT - SUBJECTIVE AND OBJECTIVE BOX
HPI:  39F w/PMHx of spinal bifida, VPS early in life (last revised at the age of 10), ESRD on HD with RUE graft/stent and renal transplant in 2013, who presents with 2 days of HA, n/v, and blurry vision. Head CT shows ventriculomegaly compared to MRI in our system from 2016 with trans-ependymal flow. Of note, she is on ASA/plavix as well as Eliquis due to her dialysis stent graft having multiple episodes of clotting off requiring intervention. (19 Jan 2020 19:28)    Stay Summary:  30 cc of CSF tapped, improved in neurologic examination     ROS: improved headache after CSF removal  All other systems negative    T(C): 36.8 (01-20-20 @ 07:00), Max: 37.2 (01-19-20 @ 19:40)  HR: 74 (01-20-20 @ 07:00) (67 - 87)  BP: 115/72 (01-20-20 @ 07:00) (92/61 - 133/73)  RR: 19 (01-20-20 @ 07:00) (15 - 22)  SpO2: 100% (01-20-20 @ 07:00) (96% - 100%)  01-19-20 @ 07:01  -  01-20-20 @ 07:00  --------------------------------------------------------  IN: 984.4 mL / OUT: 200 mL / NET: 784.4 mL    acetaminophen   Tablet .. 650 milliGRAM(s) Oral every 6 hours PRN  cangrelor Infusion 3 MICROgram(s)/kG/Min IV Continuous <Continuous>  chlorhexidine 4% Liquid 1 Application(s) Topical <User Schedule>  ferrous    sulfate 325 milliGRAM(s) Oral daily  folic acid 1 milliGRAM(s) Oral daily  metoprolol tartrate 50 milliGRAM(s) Oral every 12 hours  Nephro-devorah 1 Tablet(s) Oral daily  oxybutynin 5 milliGRAM(s) Oral two times a day  pantoprazole    Tablet 40 milliGRAM(s) Oral before breakfast  senna 2 Tablet(s) Oral at bedtime    Culture - CSF with Gram Stain (collected 01-19-20 @ 21:49)  Source: .CSF CSF  Gram Stain (01-19-20 @ 22:35):    polymorphonuclear leukocytes seen    No organisms seen    by cytocentrifuge        -----------------------------------------------------------------------------------------------------------------------------------------------------------------------------------    PHYSICAL EXAM:  General: Calm, laying in bed  HEENT: MMM  Neuro:  -Mental status- No acute distress, AOx3, conversational, following commands  -CN- PERRL 3mm, EOMI, tongue midline, face symmetric, blurry vision with up and downgaze, vertical nystagmus with upgaze, R>L horizontal nystagmus  -Motor- full strength in all ext  -Sensation- intact to LT   -Coordination- no dysmetria noted    CV: RRR  Pulm: Clear to auscultation  Abd: Soft, nontender, nondistended  Ext: No edema  Skin: warm, dry        Assessment and Plan:   · Assessment	  ASSESSMENT/PLAN: 39F w/PMHx of spinal bifida, VPS early in life (last revised at the age of 10), ESRD on HD with RUE graft/stent and renal transplant in 2013, who presents with 2 days of HA, n/v, and blurry vision.    NEURO: VPS with spina bifida  [id number]  AM cTH to eval hydro  VPS revision plan pending  pain mgt: tylenol and oxy 5 prn  Activity: [x] mobilize as tolerated [] Bedrest [x] PT [x] OT [] PMNR    PULM: room air   SpO2>92%  incentive spirometry   OOB    CV:  SBP goal 100-150    RENAL: ESRD s/p transplant  consult transplant and nephrology  HD M/W/F  self cath at home  replete lytes prn  Fluids: conservative fluid mgt with minimal UOP    GI:  Diet: advance diet as tolerated  GI prophylaxis [x] not indicated   zofran prn for nausea  Bowel regimen [x] senna [] other:    ENDO:   Goal euglycemia (-180)    HEME/ONC:  hold ASA/plavix/eliquis with possible VPS revision  start cangrelor gtt  send PRU and ARU  VTE prophylaxis: [] SCDs [x] chemoprophylaxis     ID: afebrile   no leukocytosis    MISC:    [] Patient is at high risk of neurologic deterioration/death due to: hydrocephalus    Time seen:  Time spent: __35_ [] critical care minutes HPI:  39F w/PMHx of spinal bifida, VPS early in life (last revised at the age of 10), ESRD on HD with RUE graft/stent and renal transplant in 2013, who presents with 2 days of HA, n/v, and blurry vision. Head CT shows ventriculomegaly compared to MRI in our system from 2016 with trans-ependymal flow. Of note, she is on ASA/plavix as well as Eliquis due to her dialysis stent graft having multiple episodes of clotting off requiring intervention. (19 Jan 2020 19:28)    Stay Summary:  30 cc of CSF tapped, improved in neurologic examination     ROS: improved headache after CSF removal  All other systems negative    T(C): 36.8 (01-20-20 @ 07:00), Max: 37.2 (01-19-20 @ 19:40)  HR: 74 (01-20-20 @ 07:00) (67 - 87)  BP: 115/72 (01-20-20 @ 07:00) (92/61 - 133/73)  RR: 19 (01-20-20 @ 07:00) (15 - 22)  SpO2: 100% (01-20-20 @ 07:00) (96% - 100%)  01-19-20 @ 07:01  -  01-20-20 @ 07:00  --------------------------------------------------------  IN: 984.4 mL / OUT: 200 mL / NET: 784.4 mL    acetaminophen   Tablet .. 650 milliGRAM(s) Oral every 6 hours PRN  cangrelor Infusion 3 MICROgram(s)/kG/Min IV Continuous <Continuous>  chlorhexidine 4% Liquid 1 Application(s) Topical <User Schedule>  ferrous    sulfate 325 milliGRAM(s) Oral daily  folic acid 1 milliGRAM(s) Oral daily  metoprolol tartrate 50 milliGRAM(s) Oral every 12 hours  Nephro-edvorah 1 Tablet(s) Oral daily  oxybutynin 5 milliGRAM(s) Oral two times a day  pantoprazole    Tablet 40 milliGRAM(s) Oral before breakfast  senna 2 Tablet(s) Oral at bedtime    Culture - CSF with Gram Stain (collected 01-19-20 @ 21:49)  Source: .CSF CSF  Gram Stain (01-19-20 @ 22:35):    polymorphonuclear leukocytes seen    No organisms seen    by cytocentrifuge            -----------------------------------------------------------------------------------------------------------------------------------------------------------------------------------    PHYSICAL EXAM:  General: Calm, laying in bed  HEENT: MMM  Neuro:  -Mental status- No acute distress, AOx3, conversational, following commands  -CN- PERRL 3mm, EOMI, tongue midline, face symmetric, blurry vision with up and downgaze, vertical nystagmus with upgaze, R>L horizontal nystagmus  -Motor- full strength in all ext  -Sensation- intact to LT   -Coordination- no dysmetria noted    CV: RRR  Pulm: Clear to auscultation  Abd: Soft, nontender, nondistended  Ext: No edema  Skin: warm, dry      LABS:  Na: 140 (01-19 @ 15:49)  K: 5.2 (01-19 @ 15:49)  Cl: 95 (01-19 @ 15:49)  CO2: 25 (01-19 @ 15:49)  BUN: 34 (01-19 @ 15:49)  Cr: 5.96 (01-19 @ 15:49)  Glu: 85(01-19 @ 15:49)    Hgb: 10.2 (01-19 @ 15:49)  Hct: 33.0 (01-19 @ 15:49)  WBC: 10.34 (01-19 @ 15:49)  Plt: 331 (01-19 @ 15:49)    INR: 1.20 01-19-20 @ 15:49  PTT: 31.7 01-19-20 @ 15:49      EXAM:  CT BRAIN                            PROCEDURE DATE:  01/20/2020            INTERPRETATION:    CLINICAL INFORMATION: Follow-up hydrocephalus.    TECHNIQUE: Noncontrast axial CT images were acquired through the head.     COMPARISON: CT head 1/19/2020.    FINDINGS:   Right parietal approach ventriculostomy catheter with distal tip in unchanged position at the right lateral ventricle frontal horn. A catheter fragment traversing the right lateral and third third ventricles is again noted, unchanged. Linear encephalomalacia in the superior right frontal lobe along the prior catheter tract is unchanged. An overlying craniotomy defect is again noted in the frontal bone.    There is interval decrease in the size of the lateral and third ventricles, which overall remain slightly enlarged. Bilateral decreased periventricular hypodensities likely indicate commensurate decreased transependymal flow of CSF.    For low cerebellar tonsils are again visualized consistent with a Chiari malformation. Corpus callosal dysgenesis is noted    No new hemorrhage, mass effect, or shift of midline structures.    Paranasal sinuses and mastoid air cells are clear.     IMPRESSION:   Improved hydrocephalus, with mild persistent enlargement of the lateral and third ventricles, when compared with 1/19/2020. No new hemorrhage, mass effect, or midline shift.      ASSESSMENT/PLAN: 39F w/PMHx of spinal bifida, VPS early in life (last revised at the age of 10), ESRD on HD with RUE graft/stent and renal transplant in 2013, who presents with 2 days of HA, n/v, and blurry vision.  neurologic examination improved with CSF drainage    NEURO: VPS with spina bifida possibly on friday   [id number]  monitor for worsening hydrocephalus   pain mgt: tylenol and oxy 5 prn  Activity: [x] mobilize as tolerated [] Bedrest [x] PT [x] OT [] PMNR    PULM: room air   SpO2>92%  incentive spirometry   OOB    CV:  SBP goal 100-150    RENAL: ESRD s/p transplant  consult transplant and nephrology  HD M/W/F  hyperkalemic, will need dialysis bladder scans as she still urinate  replete lytes prn    GI:  Diet: renal diet   GI prophylaxis [x] not indicated   Bowel regimen [x] senna [] other:    ENDO:   Goal euglycemia (-180)    HEME/ONC:  hold ASA/plavix/eliquis with possible VPS revision  start cangrelor gtt  VTE prophylaxis: [] SCDs [x] chemoprophylaxis heparin 5000 units q 12 hrs     ID: afebrile   no leukocytosis    MISC:    [] Patient is at high risk of neurologic deterioration/death due to: hydrocephalus    Time seen:  Time spent: __35_ [] critical care minutes HPI:  39F w/PMHx of spinal bifida, VPS early in life (last revised at the age of 10), ESRD on HD with RUE graft/stent and renal transplant in 2013, who presents with 2 days of HA, n/v, and blurry vision. Head CT shows ventriculomegaly compared to MRI in our system from 2016 with trans-ependymal flow. Of note, she is on ASA/plavix as well as Eliquis due to her dialysis stent graft having multiple episodes of clotting off requiring intervention. (19 Jan 2020 19:28)    Stay Summary:  30 cc of CSF tapped, improved in neurologic examination     ROS: improved headache after CSF removal  All other systems negative    T(C): 36.8 (01-20-20 @ 07:00), Max: 37.2 (01-19-20 @ 19:40)  HR: 74 (01-20-20 @ 07:00) (67 - 87)  BP: 115/72 (01-20-20 @ 07:00) (92/61 - 133/73)  RR: 19 (01-20-20 @ 07:00) (15 - 22)  SpO2: 100% (01-20-20 @ 07:00) (96% - 100%)  01-19-20 @ 07:01  -  01-20-20 @ 07:00  --------------------------------------------------------  IN: 984.4 mL / OUT: 200 mL / NET: 784.4 mL    acetaminophen   Tablet .. 650 milliGRAM(s) Oral every 6 hours PRN  cangrelor Infusion 3 MICROgram(s)/kG/Min IV Continuous <Continuous>  chlorhexidine 4% Liquid 1 Application(s) Topical <User Schedule>  ferrous    sulfate 325 milliGRAM(s) Oral daily  folic acid 1 milliGRAM(s) Oral daily  metoprolol tartrate 50 milliGRAM(s) Oral every 12 hours  Nephro-devorah 1 Tablet(s) Oral daily  oxybutynin 5 milliGRAM(s) Oral two times a day  pantoprazole    Tablet 40 milliGRAM(s) Oral before breakfast  senna 2 Tablet(s) Oral at bedtime    Culture - CSF with Gram Stain (collected 01-19-20 @ 21:49)  Source: .CSF CSF  Gram Stain (01-19-20 @ 22:35):    polymorphonuclear leukocytes seen    No organisms seen    by cytocentrifuge            -----------------------------------------------------------------------------------------------------------------------------------------------------------------------------------    PHYSICAL EXAM:  General: Calm, laying in bed  HEENT: MMM  Neuro:  -Mental status- No acute distress, AOx3, conversational, following commands  -CN- PERRL 3mm, EOMI, tongue midline, face symmetric,   -Motor- full strength in all ext  -Sensation- intact to LT   -Coordination- no dysmetria noted    CV: RRR  Pulm: Clear to auscultation  Abd: Soft, nontender, nondistended  Ext: No edema  Skin: warm, dry      LABS:  Na: 140 (01-19 @ 15:49)  K: 5.2 (01-19 @ 15:49)  Cl: 95 (01-19 @ 15:49)  CO2: 25 (01-19 @ 15:49)  BUN: 34 (01-19 @ 15:49)  Cr: 5.96 (01-19 @ 15:49)  Glu: 85(01-19 @ 15:49)    Hgb: 10.2 (01-19 @ 15:49)  Hct: 33.0 (01-19 @ 15:49)  WBC: 10.34 (01-19 @ 15:49)  Plt: 331 (01-19 @ 15:49)    INR: 1.20 01-19-20 @ 15:49  PTT: 31.7 01-19-20 @ 15:49      EXAM:  CT BRAIN                            PROCEDURE DATE:  01/20/2020            INTERPRETATION:    CLINICAL INFORMATION: Follow-up hydrocephalus.    TECHNIQUE: Noncontrast axial CT images were acquired through the head.     COMPARISON: CT head 1/19/2020.    FINDINGS:   Right parietal approach ventriculostomy catheter with distal tip in unchanged position at the right lateral ventricle frontal horn. A catheter fragment traversing the right lateral and third third ventricles is again noted, unchanged. Linear encephalomalacia in the superior right frontal lobe along the prior catheter tract is unchanged. An overlying craniotomy defect is again noted in the frontal bone.    There is interval decrease in the size of the lateral and third ventricles, which overall remain slightly enlarged. Bilateral decreased periventricular hypodensities likely indicate commensurate decreased transependymal flow of CSF.    For low cerebellar tonsils are again visualized consistent with a Chiari malformation. Corpus callosal dysgenesis is noted    No new hemorrhage, mass effect, or shift of midline structures.    Paranasal sinuses and mastoid air cells are clear.     IMPRESSION:   Improved hydrocephalus, with mild persistent enlargement of the lateral and third ventricles, when compared with 1/19/2020. No new hemorrhage, mass effect, or midline shift.      ASSESSMENT/PLAN: 39F w/PMHx of spinal bifida, VPS early in life (last revised at the age of 10), ESRD on HD with RUE graft/stent and renal transplant in 2013, who presents with 2 days of HA, n/v, and blurry vision.  neurologic examination improved with CSF drainage    NEURO: VPS with spina bifida possibly on friday   [id number]  monitor for worsening hydrocephalus   pain mgt: tylenol and oxy 5 prn  Activity: [x] mobilize as tolerated [] Bedrest [x] PT [x] OT [] PMNR    PULM: room air   SpO2>92%  incentive spirometry   OOB    CV:  SBP goal 100-150    RENAL: ESRD s/p transplant  consult transplant and nephrology  HD M/W/F  hyperkalemic, will need dialysis bladder scans as she still urinate  replete lytes prn    GI:  Diet: renal diet   GI prophylaxis [x] not indicated   Bowel regimen [x] senna [] other:    ENDO:   Goal euglycemia (-180)    HEME/ONC:  hold ASA/plavix/eliquis with possible VPS revision  start cangrelor gtt  VTE prophylaxis: [] SCDs [x] chemoprophylaxis heparin 5000 units q 12 hrs     ID: afebrile   no leukocytosis    MISC:    [] Patient is at high risk of neurologic deterioration/death due to: hydrocephalus    Time seen:  Time spent: __35_ [] critical care minutes

## 2020-01-20 NOTE — CONSULT NOTE ADULT - SUBJECTIVE AND OBJECTIVE BOX
[facility]  [phone]  NEPHROLOGY      INITIAL CONSULT NOTE  --------------------------------------------------------------------------------  HPI:      39F w/PMHx of spinal bifida, VPS early in life (last revised at the age of 10), ESRD on HD with RUE graft/stent and renal transplant in 2013, who presents with 2 days of HA, n/v, and blurry vision. Head CT shows ventriculomegaly compared to MRI in our system from 2016 with trans-ependymal flow. Of note, she is on ASA/plavix as well as Eliquis due to her dialysis stent graft having multiple episodes of clotting off requiring intervention. admitted for  shunt revision. last hd 3 d ago. renal consult called.     PAST HISTORY  --------------------------------------------------------------------------------  PAST MEDICAL & SURGICAL HISTORY:  ESRD (end stage renal disease) on dialysis  Spina bifida  Hydrocephalus  S/P  shunt    FAMILY HISTORY: grand father ckd     PAST SOCIAL HISTORY: no tobacco     ALLERGIES & MEDICATIONS  --------------------------------------------------------------------------------  Allergies    latex (Anaphylaxis; Rash)  No Known Drug Allergies    Intolerances      Standing Inpatient Medications  cangrelor Infusion 2.848 MICROgram(s)/kG/Min IV Continuous <Continuous>  chlorhexidine 4% Liquid 1 Application(s) Topical <User Schedule>  ferrous    sulfate 325 milliGRAM(s) Oral daily  folic acid 1 milliGRAM(s) Oral daily  heparin  Injectable 5000 Unit(s) SubCutaneous every 12 hours  metoprolol tartrate 50 milliGRAM(s) Oral every 12 hours  Nephro-devorah 1 Tablet(s) Oral daily  oxybutynin 5 milliGRAM(s) Oral two times a day  pantoprazole    Tablet 40 milliGRAM(s) Oral before breakfast  senna 2 Tablet(s) Oral at bedtime    PRN Inpatient Medications  acetaminophen   Tablet .. 650 milliGRAM(s) Oral every 6 hours PRN      REVIEW OF SYSTEMS  --------------------------------------------------------------------------------  Gen: No  fevers/chills   Skin: No rashes  Head/Eyes/Ears/Mouth: has had  headache; Normal hearing;  No sinus pain/discomfort, sore throat  Respiratory: No dyspnea, cough, wheezing, hemoptysis  CV: No chest pain, orthopnea or palp   GI: No abdominal pain, diarrhea, nausea, vomiting,  : No dysuria,  + decrease urination or hesitancy urinating   MSK: No joint pain/swelling; no back pain  Neuro: No dizziness/lightheadedness,   also with no edema     All other systems were reviewed and are negative, except as noted.    VITALS/PHYSICAL EXAM  --------------------------------------------------------------------------------  T(C): 36.9 (01-20-20 @ 15:50), Max: 37.2 (01-19-20 @ 19:40)  HR: 80 (01-20-20 @ 15:50) (67 - 89)  BP: 128/80 (01-20-20 @ 15:50) (92/61 - 134/70)  RR: 18 (01-20-20 @ 15:50) (15 - 23)  SpO2: 100% (01-20-20 @ 15:50) (96% - 100%)  Wt(kg): --  Height (cm): 152.4 (01-19-20 @ 18:29)  Weight (kg): 77 (01-19-20 @ 18:29)  BMI (kg/m2): 33.2 (01-19-20 @ 18:29)  BSA (m2): 1.74 (01-19-20 @ 18:29)      01-19-20 @ 07:01  -  01-20-20 @ 07:00  --------------------------------------------------------  IN: 984.4 mL / OUT: 200 mL / NET: 784.4 mL      Physical Exam:  	Gen: Non toxic comfortable appearing   	no jvd  	Pulm: decrease bs  no rales or ronchi or wheezing  	CV: RRR, S1S2; no rub  	Back: No CVA tenderness   	Abd: +BS, soft, nontender/nondistended  	: No suprapubic tenderness  	UE: Warm, no cyanosis  no clubbing,  no edema  	LE: Warm, no cyanosis  no clubbing, no edema  	Neuro: alert and oriented. speech coherent   	Skin: Warm, no decrease skin turgor   	Vascular access: + RUE avg     LABS/STUDIES  --------------------------------------------------------------------------------              10.2   10.34 >-----------<  331      [01-19-20 @ 15:49]              33.0     140  |  102  |  33  ----------------------------<  101      [01-20-20 @ 16:23]  3.8   |  24  |  5.18        Ca     8.5     [01-20-20 @ 16:23]      Mg     2.6     [01-19-20 @ 15:49]      Phos  3.3     [01-20-20 @ 16:23]    TPro  8.0  /  Alb  4.4  /  TBili  0.2  /  DBili  x   /  AST  <5  /  ALT  11  /  AlkPhos  87  [01-19-20 @ 15:49]    PT/INR: PT 13.8 , INR 1.20       [01-19-20 @ 15:49]  PTT: 31.7       [01-19-20 @ 15:49]      Creatinine Trend:  SCr 5.18 [01-20 @ 16:23]  SCr 5.96 [01-19 @ 15:49]      < from: Xray Shunt Series (01.19.20 @ 17:07) >    EXAM:  XR SHUNT SERIES#                            PROCEDURE DATE:  01/19/2020            INTERPRETATION:  CLINICAL INFORMATION: Ventriculoperitoneal shunt catheter malfunction.    TECHNIQUE: Frontal and lateral views of the skull, chest, and abdomen are obtained for a shunt series on 1/19/2020 5:07 PM    COMPARISON: CT head from the same day     IMPRESSION:  A continuous ventriculoperitoneal shunt catheter descends along the right cervical region, over the right anterior chest wall, and into the abdomen with the distal tip in the lower pelvis.. There are no discontinuities or kinks identified along its course.    A separate retained shunt fragment also present within the cranial cavity.     The lungs are clear. There is no bowel obstruction.      < end of copied text >

## 2020-01-20 NOTE — PROGRESS NOTE ADULT - SUBJECTIVE AND OBJECTIVE BOX
Adm for VPS malfunction, now on cangrelor drip, off ASA/plavis/eliquis, awaiting OR likely on Thursday  30cc CSF removed on 1/19 in ED    Vitals/labs/meds/imaging reviewed  alert, fully oriented, upward vertical nystagmus at baseline, b/l PF 3/5, otherwise no drift, full strength throughout

## 2020-01-21 LAB
ANION GAP SERPL CALC-SCNC: 13 MMOL/L — SIGNIFICANT CHANGE UP (ref 5–17)
ANION GAP SERPL CALC-SCNC: 15 MMOL/L — SIGNIFICANT CHANGE UP (ref 5–17)
BUN SERPL-MCNC: 18 MG/DL — SIGNIFICANT CHANGE UP (ref 7–23)
BUN SERPL-MCNC: 26 MG/DL — HIGH (ref 7–23)
CALCIUM SERPL-MCNC: 8.6 MG/DL — SIGNIFICANT CHANGE UP (ref 8.4–10.5)
CALCIUM SERPL-MCNC: 8.9 MG/DL — SIGNIFICANT CHANGE UP (ref 8.4–10.5)
CHLORIDE SERPL-SCNC: 104 MMOL/L — SIGNIFICANT CHANGE UP (ref 96–108)
CHLORIDE SERPL-SCNC: 104 MMOL/L — SIGNIFICANT CHANGE UP (ref 96–108)
CO2 SERPL-SCNC: 22 MMOL/L — SIGNIFICANT CHANGE UP (ref 22–31)
CO2 SERPL-SCNC: 26 MMOL/L — SIGNIFICANT CHANGE UP (ref 22–31)
CREAT SERPL-MCNC: 3.81 MG/DL — HIGH (ref 0.5–1.3)
CREAT SERPL-MCNC: 5.44 MG/DL — HIGH (ref 0.5–1.3)
GLUCOSE SERPL-MCNC: 101 MG/DL — HIGH (ref 70–99)
GLUCOSE SERPL-MCNC: 95 MG/DL — SIGNIFICANT CHANGE UP (ref 70–99)
HBA1C BLD-MCNC: 5.3 % — SIGNIFICANT CHANGE UP (ref 4–5.6)
HCT VFR BLD CALC: 27.1 % — LOW (ref 34.5–45)
HCT VFR BLD CALC: 27.2 % — LOW (ref 34.5–45)
HGB BLD-MCNC: 8.4 G/DL — LOW (ref 11.5–15.5)
HGB BLD-MCNC: 8.5 G/DL — LOW (ref 11.5–15.5)
MAGNESIUM SERPL-MCNC: 2.3 MG/DL — SIGNIFICANT CHANGE UP (ref 1.6–2.6)
MCHC RBC-ENTMCNC: 30.6 PG — SIGNIFICANT CHANGE UP (ref 27–34)
MCHC RBC-ENTMCNC: 30.9 PG — SIGNIFICANT CHANGE UP (ref 27–34)
MCHC RBC-ENTMCNC: 31 GM/DL — LOW (ref 32–36)
MCHC RBC-ENTMCNC: 31.3 GM/DL — LOW (ref 32–36)
MCV RBC AUTO: 97.8 FL — SIGNIFICANT CHANGE UP (ref 80–100)
MCV RBC AUTO: 99.6 FL — SIGNIFICANT CHANGE UP (ref 80–100)
NRBC # BLD: 0 /100 WBCS — SIGNIFICANT CHANGE UP (ref 0–0)
NRBC # BLD: 0 /100 WBCS — SIGNIFICANT CHANGE UP (ref 0–0)
PA ADP PRP-ACNC: 107 PRU — LOW (ref 194–417)
PHOSPHATE SERPL-MCNC: 4.4 MG/DL — SIGNIFICANT CHANGE UP (ref 2.5–4.5)
PLATELET # BLD AUTO: 268 K/UL — SIGNIFICANT CHANGE UP (ref 150–400)
PLATELET # BLD AUTO: 281 K/UL — SIGNIFICANT CHANGE UP (ref 150–400)
POTASSIUM SERPL-MCNC: 3.6 MMOL/L — SIGNIFICANT CHANGE UP (ref 3.5–5.3)
POTASSIUM SERPL-MCNC: 4.2 MMOL/L — SIGNIFICANT CHANGE UP (ref 3.5–5.3)
POTASSIUM SERPL-SCNC: 3.6 MMOL/L — SIGNIFICANT CHANGE UP (ref 3.5–5.3)
POTASSIUM SERPL-SCNC: 4.2 MMOL/L — SIGNIFICANT CHANGE UP (ref 3.5–5.3)
RBC # BLD: 2.72 M/UL — LOW (ref 3.8–5.2)
RBC # BLD: 2.78 M/UL — LOW (ref 3.8–5.2)
RBC # FLD: 14.7 % — HIGH (ref 10.3–14.5)
RBC # FLD: 15 % — HIGH (ref 10.3–14.5)
SODIUM SERPL-SCNC: 141 MMOL/L — SIGNIFICANT CHANGE UP (ref 135–145)
SODIUM SERPL-SCNC: 143 MMOL/L — SIGNIFICANT CHANGE UP (ref 135–145)
TROPONIN T, HIGH SENSITIVITY RESULT: 12 NG/L — SIGNIFICANT CHANGE UP (ref 0–51)
WBC # BLD: 10.62 K/UL — HIGH (ref 3.8–10.5)
WBC # BLD: 8.6 K/UL — SIGNIFICANT CHANGE UP (ref 3.8–10.5)
WBC # FLD AUTO: 10.62 K/UL — HIGH (ref 3.8–10.5)
WBC # FLD AUTO: 8.6 K/UL — SIGNIFICANT CHANGE UP (ref 3.8–10.5)

## 2020-01-21 PROCEDURE — 99291 CRITICAL CARE FIRST HOUR: CPT

## 2020-01-21 PROCEDURE — 93970 EXTREMITY STUDY: CPT | Mod: 26

## 2020-01-21 PROCEDURE — 99292 CRITICAL CARE ADDL 30 MIN: CPT

## 2020-01-21 PROCEDURE — 70450 CT HEAD/BRAIN W/O DYE: CPT | Mod: 26

## 2020-01-21 RX ORDER — OXYCODONE HYDROCHLORIDE 5 MG/1
5 TABLET ORAL ONCE
Refills: 0 | Status: DISCONTINUED | OUTPATIENT
Start: 2020-01-21 | End: 2020-01-21

## 2020-01-21 RX ORDER — OXYCODONE HYDROCHLORIDE 5 MG/1
5 TABLET ORAL EVERY 6 HOURS
Refills: 0 | Status: DISCONTINUED | OUTPATIENT
Start: 2020-01-21 | End: 2020-01-23

## 2020-01-21 RX ORDER — METOCLOPRAMIDE HCL 10 MG
10 TABLET ORAL ONCE
Refills: 0 | Status: DISCONTINUED | OUTPATIENT
Start: 2020-01-21 | End: 2020-01-21

## 2020-01-21 RX ADMIN — CANGRELOR 69.3 MICROGRAM(S)/KG/MIN: 50 INJECTION, POWDER, LYOPHILIZED, FOR SOLUTION INTRAVENOUS at 21:58

## 2020-01-21 RX ADMIN — Medication 650 MILLIGRAM(S): at 02:45

## 2020-01-21 RX ADMIN — PANTOPRAZOLE SODIUM 40 MILLIGRAM(S): 20 TABLET, DELAYED RELEASE ORAL at 08:22

## 2020-01-21 RX ADMIN — CANGRELOR 69.3 MICROGRAM(S)/KG/MIN: 50 INJECTION, POWDER, LYOPHILIZED, FOR SOLUTION INTRAVENOUS at 05:53

## 2020-01-21 RX ADMIN — POLYETHYLENE GLYCOL 3350 17 GRAM(S): 17 POWDER, FOR SOLUTION ORAL at 05:47

## 2020-01-21 RX ADMIN — HEPARIN SODIUM 5000 UNIT(S): 5000 INJECTION INTRAVENOUS; SUBCUTANEOUS at 17:21

## 2020-01-21 RX ADMIN — CANGRELOR 69.3 MICROGRAM(S)/KG/MIN: 50 INJECTION, POWDER, LYOPHILIZED, FOR SOLUTION INTRAVENOUS at 17:33

## 2020-01-21 RX ADMIN — CHLORHEXIDINE GLUCONATE 1 APPLICATION(S): 213 SOLUTION TOPICAL at 21:19

## 2020-01-21 RX ADMIN — Medication 1 TABLET(S): at 11:39

## 2020-01-21 RX ADMIN — Medication 50 MILLIGRAM(S): at 05:53

## 2020-01-21 RX ADMIN — Medication 5 MILLIGRAM(S): at 17:21

## 2020-01-21 RX ADMIN — Medication 650 MILLIGRAM(S): at 03:15

## 2020-01-21 RX ADMIN — Medication 1 MILLIGRAM(S): at 11:40

## 2020-01-21 RX ADMIN — OXYCODONE HYDROCHLORIDE 5 MILLIGRAM(S): 5 TABLET ORAL at 08:55

## 2020-01-21 RX ADMIN — Medication 325 MILLIGRAM(S): at 11:40

## 2020-01-21 RX ADMIN — CANGRELOR 69.3 MICROGRAM(S)/KG/MIN: 50 INJECTION, POWDER, LYOPHILIZED, FOR SOLUTION INTRAVENOUS at 00:50

## 2020-01-21 RX ADMIN — HEPARIN SODIUM 5000 UNIT(S): 5000 INJECTION INTRAVENOUS; SUBCUTANEOUS at 05:48

## 2020-01-21 RX ADMIN — Medication 5 MILLIGRAM(S): at 05:48

## 2020-01-21 RX ADMIN — Medication 50 MILLIGRAM(S): at 17:33

## 2020-01-21 RX ADMIN — OXYCODONE HYDROCHLORIDE 5 MILLIGRAM(S): 5 TABLET ORAL at 08:25

## 2020-01-21 NOTE — DIETITIAN INITIAL EVALUATION ADULT. - OTHER INFO
Pt reports good appetite and PO intake PTA, except for the Friday before her admission (01/17); Stated emesis x 3 before and during HD on 01/17. Pt states she follows a renal diet closely at home, monitoring potassium, phosphorous, and sodium intake. States her mother who is diabetic does the cooking so they eat a diet low in sugar as well at home. Pt states she takes vitamin C and D at home. Denies use of nutritional supplements. States NKFA.  Pt reports continued good appetite and PO intake in house. Denies difficulty chewing and swallowing. States no N+V. States last BM yesterday (01/20) with no GI distress. Pt denies recent weight changes. States she notices her clothing fits a bit looser after HD but that is "normal" for her. States UBW is "around 160 pounds". Current post HD weight is 165.3 pounds, consistent with pt's stated UBW. Will continue to monitor.  Pt well versed in renal diet. Pt taught back important nutrients to monitor before, during, and after HD. Reinforced importance of adhering to diet. Pt reports good appetite and PO intake PTA, except for the Friday before her admission (01/17); Stated emesis x 3 before and during HD on 01/17. Pt states she follows a renal diet closely at home, monitoring potassium, phosphorous, and sodium intake. States her mother who is diabetic does the cooking so they eat a diet low in sugar as well at home. Pt states she takes vitamin C and D at home. Denies use of nutritional supplements. States NKFA.  Pt reports continued good appetite and PO intake in house. Denies difficulty chewing and swallowing. States some nausea today due to headache; denies vomiting. States last BM yesterday (01/20) with no GI distress. Pt denies recent weight changes. States she notices her clothing fits a bit looser after HD but that is "normal" for her. States UBW is "around 160 pounds". Current post HD weight is 165.3 pounds, consistent with pt's stated UBW. Will continue to monitor.  Pt well versed in renal diet. Pt taught back important nutrients to monitor before, during, and after HD. Reinforced importance of adhering to diet. Discussed increased needs and importance of protein intake due to HD and pressure ulcer on left heel.

## 2020-01-21 NOTE — DIETITIAN INITIAL EVALUATION ADULT. - REASON INDICATOR FOR ASSESSMENT
Pt seen for NSCU length of stay initial nutrition evaluation.  Information obtained from medical rounds, medical record, pt, and pt's mother at bedside.

## 2020-01-21 NOTE — PROGRESS NOTE ADULT - ASSESSMENT
VPS malfunction    monitor for 2/2 hydrocephalus  cont cangrelor drip  SQH  OR plan per neurosurgery  renal diet  HD M/W/F, renal following  bowel regimen  monitor for fevers

## 2020-01-21 NOTE — DIETITIAN INITIAL EVALUATION ADULT. - PHYSICAL APPEARANCE
Nutrition focused physical exam deferred at this time as pt has numerous access points; No visual signs of muscle wasting or fat loss noted./obese/other (specify) Ht: 60 inches Wt: 165.3 pounds post HD (01/20) BMI: 32.3 kg/m2 IBW: 100 pounds (+/-10%) %IBW: 165%  Edema: none noted per flow sheets Skin: Unstageable Pressure Injury on left heel

## 2020-01-21 NOTE — PROGRESS NOTE ADULT - SUBJECTIVE AND OBJECTIVE BOX
HPI:  39F w/PMHx of spinal bifida, VPS early in life (last revised at the age of 10), ESRD on HD with RUE graft/stent and renal transplant in 2013, who presents with 2 days of HA, n/v, and blurry vision. Head CT shows ventriculomegaly compared to MRI in our system from 2016 with trans-ependymal flow. Of note, she is on ASA/plavix as well as Eliquis due to her dialysis stent graft having multiple episodes of clotting off requiring intervention. (19 Jan 2020 19:28)    Stay Summary:  30 cc of CSF tapped, improved in neurologic examination     ROS: improved headache after CSF removal  All other systems negative    T(C): 36.9 (01-21-20 @ 07:00), Max: 37.4 (01-20-20 @ 23:00)  HR: 86 (01-21-20 @ 09:00) (73 - 101)  BP: 106/75 (01-21-20 @ 09:00) (92/56 - 134/70)  RR: 26 (01-21-20 @ 09:00) (12 - 26)  SpO2: 100% (01-21-20 @ 09:00) (97% - 100%)  01-20-20 @ 07:01  -  01-21-20 @ 07:00  --------------------------------------------------------  IN: 1887.4 mL / OUT: 2176 mL / NET: -288.6 mL    01-21-20 @ 07:01  -  01-21-20 @ 10:49  --------------------------------------------------------  IN: 378.6 mL / OUT: 0 mL / NET: 378.6 mL    acetaminophen   Tablet .. 650 milliGRAM(s) Oral every 6 hours PRN  cangrelor Infusion 2.848 MICROgram(s)/kG/Min IV Continuous <Continuous>  chlorhexidine 4% Liquid 1 Application(s) Topical <User Schedule>  ferrous    sulfate 325 milliGRAM(s) Oral daily  folic acid 1 milliGRAM(s) Oral daily  heparin  Injectable 5000 Unit(s) SubCutaneous every 12 hours  metoclopramide Injectable 10 milliGRAM(s) IV Push once  metoprolol tartrate 50 milliGRAM(s) Oral every 12 hours  Nephro-devorah 1 Tablet(s) Oral daily  oxybutynin 5 milliGRAM(s) Oral two times a day  pantoprazole    Tablet 40 milliGRAM(s) Oral before breakfast  polyethylene glycol 3350 17 Gram(s) Oral every 12 hours  senna 2 Tablet(s) Oral at bedtime    PHYSICAL EXAM:  General: Calm, laying in bed  HEENT: MMM  Neuro:  -Mental status- No acute distress, AOx3, conversational, following commands  -CN- PERRL 3mm, EOMI, tongue midline, face symmetric,   -Motor- full strength in all ext  -Sensation- intact to LT   -Coordination- no dysmetria noted  CV: RRR  Pulm: Clear to auscultation  Abd: Soft, nontender, nondistended  Ext: No edema  Skin: warm, dry        LABS:  Na: 143 (01-21 @ 02:11), 143 (01-20 @ 21:51), 140 (01-20 @ 16:23), 140 (01-19 @ 15:49)  K: 3.6 (01-21 @ 02:11), 2.8 (01-20 @ 21:51), 3.8 (01-20 @ 16:23), 5.2 (01-19 @ 15:49)  Cl: 104 (01-21 @ 02:11), 107 (01-20 @ 21:51), 102 (01-20 @ 16:23), 95 (01-19 @ 15:49)  CO2: 26 (01-21 @ 02:11), 24 (01-20 @ 21:51), 24 (01-20 @ 16:23), 25 (01-19 @ 15:49)  BUN: 18 (01-21 @ 02:11), 12 (01-20 @ 21:51), 33 (01-20 @ 16:23), 34 (01-19 @ 15:49)  Cr: 3.81 (01-21 @ 02:11), 2.70 (01-20 @ 21:51), 5.18 (01-20 @ 16:23), 5.96 (01-19 @ 15:49)  Glu: 95(01-21 @ 02:11), 109(01-20 @ 21:51), 101(01-20 @ 16:23), 85(01-19 @ 15:49)    Hgb: 8.5 (01-21 @ 02:11), 7.7 (01-20 @ 21:52), 10.2 (01-19 @ 15:49)  Hct: 27.2 (01-21 @ 02:11), 24.7 (01-20 @ 21:52), 33.0 (01-19 @ 15:49)  WBC: 10.62 (01-21 @ 02:11), 8.69 (01-20 @ 21:52), 10.34 (01-19 @ 15:49)  Plt: 281 (01-21 @ 02:11), 247 (01-20 @ 21:52), 331 (01-19 @ 15:49)    INR: 1.20 01-19-20 @ 15:49  PTT: 31.7 01-19-20 @ 15:49        EXAM:  CT BRAIN                            PROCEDURE DATE:  01/20/2020            INTERPRETATION:    CLINICAL INFORMATION: Follow-up hydrocephalus.    TECHNIQUE: Noncontrast axial CT images were acquired through the head.     COMPARISON: CT head 1/19/2020.    FINDINGS:   Right parietal approach ventriculostomy catheter with distal tip in unchanged position at the right lateral ventricle frontal horn. A catheter fragment traversing the right lateral and third third ventricles is again noted, unchanged. Linear encephalomalacia in the superior right frontal lobe along the prior catheter tract is unchanged. An overlying craniotomy defect is again noted in the frontal bone.    There is interval decrease in the size of the lateral and third ventricles, which overall remain slightly enlarged. Bilateral decreased periventricular hypodensities likely indicate commensurate decreased transependymal flow of CSF.    For low cerebellar tonsils are again visualized consistent with a Chiari malformation. Corpus callosal dysgenesis is noted    No new hemorrhage, mass effect, or shift of midline structures.    Paranasal sinuses and mastoid air cells are clear.     IMPRESSION:   Improved hydrocephalus, with mild persistent enlargement of the lateral and third ventricles, when compared with 1/19/2020. No new hemorrhage, mass effect, or midline shift.      ASSESSMENT/PLAN: 39F w/PMHx of spinal bifida, VPS early in life (last revised at the age of 10), ESRD on HD with RUE graft/stent and renal transplant in 2013, who presents with 2 days of HA, n/v, and blurry vision.  neurologic examination improved with CSF drainage    NEURO: spina bifida, VPS    CT stereo  follow P2Y12  OR Thursday   neuro checks q 1 hr   [id number]  monitor for worsening hydrocephalus   pain mgt: Tylenol and oxy 5 prn  Activity: [x] mobilize as tolerated [] Bedrest [x] PT [x] OT [] PMNR    PULM: room air   SpO2>92%  incentive spirometry   OOB    CV:  SBP goal 100-150  on cangrelor for AVfistula stent     RENAL: ESRD s/p transplant  consult transplant and nephrology  HD M/W/F   bladder scans as she still urinate  replete lytes prn    GI:  Diet: renal diet   last BM 1/20/19  GI prophylaxis [x] not indicated   Bowel regimen [x] senna [] other:    ENDO:   Goal euglycemia (-180)    HEME/ONC:  hold ASA/plavix/eliquis with possible VPS revision   cangrelor gtt  VTE prophylaxis: [] SCDs [x] chemoprophylaxis heparin 5000 units q 12 hrs     ID: afebrile   no leukocytosis    MISC:    [] Patient is at high risk of neurologic deterioration/death due to: hydrocephalus    Time seen:  Time spent: __35_ [] critical care minutes

## 2020-01-21 NOTE — PHARMACOTHERAPY INTERVENTION NOTE - COMMENTS
Confirmed home medications with patient, updated in Outpatient Medication Review.    nephrovite Rx once daily  methenamine hippurate 1 g once daily  folic acid 1 mg once daily  metoprolol succinate 100 mg once daily  oxybutynin 15 mg once daily  aspirin 81 mg once daily  High Potency Iron once daily  fish oil 1000 mg once dialy  clopidogrel 75 mg once daily  Eliquis 2.5 mg twice daily  pantoprazole 40 mg once daily    Erin Carlson, PharmD  PGY-1 Pharmacy Resident  [phone]

## 2020-01-21 NOTE — PROGRESS NOTE ADULT - SUBJECTIVE AND OBJECTIVE BOX
Patient seen and examined at bedside.    --Anticoagulation--  cangrelor Infusion 2.848 MICROgram(s)/kG/Min IV Continuous <Continuous>  heparin  Injectable 5000 Unit(s) SubCutaneous every 12 hours    T(C): 37.4 (01-20-20 @ 23:00), Max: 37.4 (01-20-20 @ 23:00)  HR: 93 (01-21-20 @ 01:00) (69 - 101)  BP: 102/62 (01-21-20 @ 01:00) (92/56 - 134/70)  RR: 21 (01-21-20 @ 01:00) (13 - 24)  SpO2: 97% (01-21-20 @ 01:00) (96% - 100%)  Wt(kg): --    Exam:  AOx3, on cell phone  Upward vertical nystagmus b/l PF 3/5 at baseline  Otherwise 5/5 throughout

## 2020-01-21 NOTE — DIETITIAN INITIAL EVALUATION ADULT. - ADD RECOMMEND
1. Continue to monitor PO intake, diet tolerance, weight, labs, skin integrity, food preferences, and further educational needs. 2. Reinforced renal replacement nutrition therapy and importance of PO intake for increased needs. 3. Pt made aware RD remains available.

## 2020-01-21 NOTE — DIETITIAN INITIAL EVALUATION ADULT. - ENERGY NEEDS
Per medical record pt is a 40yo female with PMH: spina bifida, VPS early in life (last revised at the age of 10), ESRD on HD with RUE graft/stent and renal transplant in 2013, who presents with 2 days of HA, n/v, and blurry vision. Head CT shows ventriculomegaly. Dialysis stent graft has had multiple episodes of clotting off requiring intervention. Per medical rounds plan for surgery on Thursday for shunt revision. Pt had last HD yesterday (01/20) with 2000ml fluid removal.

## 2020-01-21 NOTE — PROGRESS NOTE ADULT - ASSESSMENT
39F w/hx of ESRD on HD with renal tx in 2013, VPS last revised at the age of 10, spina bifida, and ASA/plvx/Eliquis use 2/2 previous episodes of dialysis stent clotting who presents with clinical and radiographic evidence of shunt failure  - cont cangrelor while awaiting eliquis / aspirin to become non-therapeutic  - cont HD, high risk for graft failure, plan for peritoneal dialysis vs line placement as needed  - CSF sent 1/19 NGTD  - Plan for shunt revision approx 4-5 days after last dose of eliquis, repeat ARU prior to OR

## 2020-01-22 PROBLEM — G91.9 HYDROCEPHALUS, UNSPECIFIED: Chronic | Status: ACTIVE | Noted: 2020-01-19

## 2020-01-22 PROBLEM — Q05.9 SPINA BIFIDA, UNSPECIFIED: Chronic | Status: ACTIVE | Noted: 2020-01-19

## 2020-01-22 PROBLEM — N18.6 END STAGE RENAL DISEASE: Chronic | Status: ACTIVE | Noted: 2020-01-19

## 2020-01-22 LAB
ANION GAP SERPL CALC-SCNC: 12 MMOL/L — SIGNIFICANT CHANGE UP (ref 5–17)
BUN SERPL-MCNC: 10 MG/DL — SIGNIFICANT CHANGE UP (ref 7–23)
CALCIUM SERPL-MCNC: 9.1 MG/DL — SIGNIFICANT CHANGE UP (ref 8.4–10.5)
CHLORIDE SERPL-SCNC: 94 MMOL/L — LOW (ref 96–108)
CO2 SERPL-SCNC: 30 MMOL/L — SIGNIFICANT CHANGE UP (ref 22–31)
CREAT SERPL-MCNC: 3.11 MG/DL — HIGH (ref 0.5–1.3)
CULTURE RESULTS: NO GROWTH — SIGNIFICANT CHANGE UP
GLUCOSE SERPL-MCNC: 133 MG/DL — HIGH (ref 70–99)
HBA1C BLD-MCNC: 5.4 % — SIGNIFICANT CHANGE UP (ref 4–5.6)
HCT VFR BLD CALC: 27.7 % — LOW (ref 34.5–45)
HGB BLD-MCNC: 8.9 G/DL — LOW (ref 11.5–15.5)
MAGNESIUM SERPL-MCNC: 2 MG/DL — SIGNIFICANT CHANGE UP (ref 1.6–2.6)
MCHC RBC-ENTMCNC: 31.6 PG — SIGNIFICANT CHANGE UP (ref 27–34)
MCHC RBC-ENTMCNC: 32.1 GM/DL — SIGNIFICANT CHANGE UP (ref 32–36)
MCV RBC AUTO: 98.2 FL — SIGNIFICANT CHANGE UP (ref 80–100)
NRBC # BLD: 0 /100 WBCS — SIGNIFICANT CHANGE UP (ref 0–0)
PA ADP PRP-ACNC: 107 PRU — LOW (ref 194–417)
PHOSPHATE SERPL-MCNC: 2.6 MG/DL — SIGNIFICANT CHANGE UP (ref 2.5–4.5)
PLATELET # BLD AUTO: 297 K/UL — SIGNIFICANT CHANGE UP (ref 150–400)
PLATELET RESPONSE ASPIRIN RESULT: 538 ARU — SIGNIFICANT CHANGE UP (ref 350–700)
POTASSIUM SERPL-MCNC: 3.5 MMOL/L — SIGNIFICANT CHANGE UP (ref 3.5–5.3)
POTASSIUM SERPL-SCNC: 3.5 MMOL/L — SIGNIFICANT CHANGE UP (ref 3.5–5.3)
RBC # BLD: 2.82 M/UL — LOW (ref 3.8–5.2)
RBC # FLD: 14.8 % — HIGH (ref 10.3–14.5)
SODIUM SERPL-SCNC: 136 MMOL/L — SIGNIFICANT CHANGE UP (ref 135–145)
SPECIMEN SOURCE: SIGNIFICANT CHANGE UP
WBC # BLD: 9.97 K/UL — SIGNIFICANT CHANGE UP (ref 3.8–10.5)
WBC # FLD AUTO: 9.97 K/UL — SIGNIFICANT CHANGE UP (ref 3.8–10.5)

## 2020-01-22 PROCEDURE — 70450 CT HEAD/BRAIN W/O DYE: CPT | Mod: 26

## 2020-01-22 PROCEDURE — 99233 SBSQ HOSP IP/OBS HIGH 50: CPT

## 2020-01-22 RX ORDER — MIDODRINE HYDROCHLORIDE 2.5 MG/1
2.5 TABLET ORAL ONCE
Refills: 0 | Status: COMPLETED | OUTPATIENT
Start: 2020-01-22 | End: 2020-01-22

## 2020-01-22 RX ORDER — CLOPIDOGREL BISULFATE 75 MG/1
75 TABLET, FILM COATED ORAL DAILY
Refills: 0 | Status: DISCONTINUED | OUTPATIENT
Start: 2020-01-22 | End: 2020-01-23

## 2020-01-22 RX ORDER — ERYTHROPOIETIN 10000 [IU]/ML
10000 INJECTION, SOLUTION INTRAVENOUS; SUBCUTANEOUS ONCE
Refills: 0 | Status: COMPLETED | OUTPATIENT
Start: 2020-01-22 | End: 2020-01-22

## 2020-01-22 RX ORDER — APIXABAN 2.5 MG/1
2.5 TABLET, FILM COATED ORAL EVERY 12 HOURS
Refills: 0 | Status: DISCONTINUED | OUTPATIENT
Start: 2020-01-22 | End: 2020-01-23

## 2020-01-22 RX ORDER — CANGRELOR 50 MG/1
2.85 INJECTION, POWDER, LYOPHILIZED, FOR SOLUTION INTRAVENOUS
Qty: 50 | Refills: 0 | Status: DISCONTINUED | OUTPATIENT
Start: 2020-01-22 | End: 2020-01-22

## 2020-01-22 RX ORDER — ASPIRIN/CALCIUM CARB/MAGNESIUM 324 MG
81 TABLET ORAL DAILY
Refills: 0 | Status: DISCONTINUED | OUTPATIENT
Start: 2020-01-22 | End: 2020-01-23

## 2020-01-22 RX ORDER — CLOPIDOGREL BISULFATE 75 MG/1
75 TABLET, FILM COATED ORAL DAILY
Refills: 0 | Status: DISCONTINUED | OUTPATIENT
Start: 2020-01-22 | End: 2020-01-22

## 2020-01-22 RX ORDER — APIXABAN 2.5 MG/1
2.5 TABLET, FILM COATED ORAL
Refills: 0 | Status: DISCONTINUED | OUTPATIENT
Start: 2020-01-22 | End: 2020-01-22

## 2020-01-22 RX ORDER — ASPIRIN/CALCIUM CARB/MAGNESIUM 324 MG
81 TABLET ORAL DAILY
Refills: 0 | Status: DISCONTINUED | OUTPATIENT
Start: 2020-01-22 | End: 2020-01-22

## 2020-01-22 RX ADMIN — Medication 50 MILLIGRAM(S): at 05:40

## 2020-01-22 RX ADMIN — CLOPIDOGREL BISULFATE 75 MILLIGRAM(S): 75 TABLET, FILM COATED ORAL at 16:14

## 2020-01-22 RX ADMIN — Medication 325 MILLIGRAM(S): at 12:18

## 2020-01-22 RX ADMIN — Medication 50 MILLIGRAM(S): at 17:44

## 2020-01-22 RX ADMIN — Medication 1 MILLIGRAM(S): at 12:18

## 2020-01-22 RX ADMIN — ERYTHROPOIETIN 10000 UNIT(S): 10000 INJECTION, SOLUTION INTRAVENOUS; SUBCUTANEOUS at 13:59

## 2020-01-22 RX ADMIN — Medication 81 MILLIGRAM(S): at 16:14

## 2020-01-22 RX ADMIN — HEPARIN SODIUM 5000 UNIT(S): 5000 INJECTION INTRAVENOUS; SUBCUTANEOUS at 05:39

## 2020-01-22 RX ADMIN — Medication 5 MILLIGRAM(S): at 17:44

## 2020-01-22 RX ADMIN — PANTOPRAZOLE SODIUM 40 MILLIGRAM(S): 20 TABLET, DELAYED RELEASE ORAL at 09:50

## 2020-01-22 RX ADMIN — CANGRELOR 69.3 MICROGRAM(S)/KG/MIN: 50 INJECTION, POWDER, LYOPHILIZED, FOR SOLUTION INTRAVENOUS at 05:40

## 2020-01-22 RX ADMIN — MIDODRINE HYDROCHLORIDE 2.5 MILLIGRAM(S): 2.5 TABLET ORAL at 12:17

## 2020-01-22 RX ADMIN — Medication 1 TABLET(S): at 12:18

## 2020-01-22 RX ADMIN — Medication 5 MILLIGRAM(S): at 05:40

## 2020-01-22 RX ADMIN — APIXABAN 2.5 MILLIGRAM(S): 2.5 TABLET, FILM COATED ORAL at 18:15

## 2020-01-22 NOTE — PROGRESS NOTE ADULT - ASSESSMENT
39F w/hx of ESRD on HD with renal tx in 2013, VPS last revised at the age of 10, spina bifida, and ASA/plvx/Eliquis use 2/2 previous episodes of dialysis stent clotting who presents with clinical and radiographic evidence of shunt failure  - cont cangrelor while awaiting eliquis / aspirin to become non-therapeutic  - cont HD MWF  - CSF sent 1/19 NGTD  - Plan for OR later this week as long as patient remains stable  - cont hydrowatch, stereo scan prior to OR

## 2020-01-22 NOTE — ADVANCED PRACTICE NURSE CONSULT - RECOMMEDATIONS
Will recommend the followin. Consider Podiatry consult for further evaluation  2. Daily foot care and apply Sween 24 moisturizer to dry skin.  Tx plan discussed with RN

## 2020-01-22 NOTE — PROGRESS NOTE ADULT - SUBJECTIVE AND OBJECTIVE BOX
Patient seen and examined at bedside.    --Anticoagulation--  cangrelor Infusion 2.848 MICROgram(s)/kG/Min IV Continuous <Continuous>  heparin  Injectable 5000 Unit(s) SubCutaneous every 12 hours    T(C): 37.1 (01-22-20 @ 03:00), Max: 37.4 (01-21-20 @ 19:00)  HR: 68 (01-22-20 @ 03:00) (68 - 92)  BP: 104/58 (01-22-20 @ 03:00) (99/61 - 121/79)  RR: 18 (01-22-20 @ 03:00) (15 - 29)  SpO2: 100% (01-22-20 @ 03:00) (97% - 100%)  Wt(kg): --    Exam:  AOx3  Upward vertical nystagmus b/l PF 3/5 at baseline  Otherwise 5/5 throughout

## 2020-01-22 NOTE — PROGRESS NOTE ADULT - ASSESSMENT
39F w/PMHx of spinal bifida, VPS early in life (last revised at the age of 10), ESRD on HD with RUE graft/stent and renal transplant in 2013, who presents with 2 days of HA, n/v, and blurry vision. Head CT shows ventriculomegaly compared to MRI in our system from 2016 with trans-ependymal flow. Of note, she is on ASA/plavix as well as Eliquis due to her dialysis stent graft having multiple episodes of clotting off requiring intervention. admitted for  shunt revision.     1- esrd  2- HTN   3- shpt  4- anemia         hd f 180 240 min 1.5 liter 2 k bath bfr 400 dfr 600   epogen 01694 U ivp   is on asa plavix and eliquis,   i have contacted her vascular surgeon spoke to his PA in detail and concern of all these AC use together. i have been informed to cont with asa/plavix/eliquis

## 2020-01-22 NOTE — ADVANCED PRACTICE NURSE CONSULT - ASSESSMENT
The pts mother and sister were at the bedside and observed the wound on the heel. Upon assessment the pt presents with a large dark callous on the left heel measuring 5cm x 4.5cm x0cm. There is a scant amount of bloody drainage noted . The pedal pulses are + bilaterally, pt with foot deformity secondary to spina bifida.  The skin on her feet is dry and flaking.  Discussed with RN and suggested that Podiatry be consulted for further evaluation.

## 2020-01-22 NOTE — PROGRESS NOTE ADULT - SUBJECTIVE AND OBJECTIVE BOX
[facility]   [phone]  DIALYSIS NOTE  Chief Complaint: ESRD/Ongoing hemodialysis requirement.    24 hour events/subjective:    states feels well   had hypotension with last hd   now has informed me takes midodrine 2.5 mg pre hd         ALLERGIES & MEDICATIONS  --------------------------------------------------------------------------------  Allergies    latex (Anaphylaxis; Rash)  No Known Drug Allergies    Intolerances      Standing Inpatient Medications  apixaban 2.5 milliGRAM(s) Oral every 12 hours  aspirin  chewable 81 milliGRAM(s) Oral daily  chlorhexidine 4% Liquid 1 Application(s) Topical <User Schedule>  clopidogrel Tablet 75 milliGRAM(s) Oral daily  ferrous    sulfate 325 milliGRAM(s) Oral daily  folic acid 1 milliGRAM(s) Oral daily  metoprolol tartrate 50 milliGRAM(s) Oral every 12 hours  Nephro-devorah 1 Tablet(s) Oral daily  oxybutynin 5 milliGRAM(s) Oral two times a day  pantoprazole    Tablet 40 milliGRAM(s) Oral before breakfast  polyethylene glycol 3350 17 Gram(s) Oral every 12 hours  senna 2 Tablet(s) Oral at bedtime    PRN Inpatient Medications  acetaminophen   Tablet .. 650 milliGRAM(s) Oral every 6 hours PRN  oxyCODONE    IR 5 milliGRAM(s) Oral every 6 hours PRN      REVIEW OF SYSTEMS  --------------------------------------------------------------------------------  no itching or rash  no fever or chill  no cp or palp   no sob or cough   no N/V/D/ no abd pain   ext no edema no pain         VITALS/PHYSICAL EXAM  --------------------------------------------------------------------------------  T(C): 37.2 (01-22-20 @ 15:00), Max: 37.4 (01-21-20 @ 19:00)  HR: 89 (01-22-20 @ 15:00) (68 - 92)  BP: 125/96 (01-22-20 @ 15:00) (102/62 - 125/96)  RR: 20 (01-22-20 @ 15:00) (14 - 29)  SpO2: 100% (01-22-20 @ 15:00) (97% - 100%)  Wt(kg): --        01-21-20 @ 07:01  -  01-22-20 @ 07:00  --------------------------------------------------------  IN: 2733.9 mL / OUT: 150 mL / NET: 2583.9 mL    01-22-20 @ 07:01  -  01-22-20 @ 16:49  --------------------------------------------------------  IN: 309.3 mL / OUT: 325 mL / NET: -15.7 mL      Physical Exam:  		  Gen: Non toxic comfortable appearing   	no jvd  	Pulm: decrease bs  no rales or ronchi or wheezing  	CV: RRR, S1S2; no rub  	Abd: +BS, soft, nontender/nondistended  	: No suprapubic tenderness  	UE: Warm, no cyanosis  no clubbing,  no edema  	LE: Warm, no cyanosis  no clubbing, no edema  	Neuro: alert and oriented. speech coherent   	    LABS/STUDIES  --------------------------------------------------------------------------------              8.4    8.60  >-----------<  268      [01-21-20 @ 21:21]              27.1     141  |  104  |  26  ----------------------------<  101      [01-21-20 @ 21:21]  4.2   |  22  |  5.44        Ca     8.9     [01-21-20 @ 21:21]      Mg     2.3     [01-21-20 @ 21:21]      Phos  4.4     [01-21-20 @ 21:21]                    imp/suggest: ESRD      Hemodialysis Prescription:  	Access:  	Dialyzer: revaclear   	Blood Flow (mL/Min): 400  	Dialysate Flow (mL/Min): 600  	Target UF (Liters):  	Treatment Time:  	Potassium:   	Calcium: 2.5  	  MIRANDA    Vitamin D     continue with hd   see hd flow sheet

## 2020-01-22 NOTE — PHYSICAL THERAPY INITIAL EVALUATION ADULT - ADDITIONAL COMMENTS
Pt states she lives with her mother in an apartment with ramp access, elevator inside. Pt states she is ambulatory short distances without any devices. Pt states she has a self-propelled wheelchair and a motorized wheelchair for community mobility. Pt states she owns a shower chair. Pt states prior to admission being independent with all functional mobility and ADLs. Pt states she was working prior to admission.

## 2020-01-22 NOTE — PHYSICAL THERAPY INITIAL EVALUATION ADULT - PHYSICAL ASSIST/NONPHYSICAL ASSIST: GAIT, REHAB EVAL
supervision

## 2020-01-22 NOTE — PHYSICAL THERAPY INITIAL EVALUATION ADULT - PERTINENT HX OF CURRENT PROBLEM, REHAB EVAL
40 y/o F with PMH of spinal bifida, VPS early in life (last revised at the age of 10), ESRD on HD with RUE graft/stent and renal transplant in 2013, now p/w 2 days of HA, n/v, and blurry vision. Head CT shows ventriculomegaly compared to MRI in our system from 2016 with trans-ependymal flow. Neurologic examination improved with CSF drainage.

## 2020-01-22 NOTE — PROGRESS NOTE ADULT - SUBJECTIVE AND OBJECTIVE BOX
HPI:  39F w/PMHx of spinal bifida, VPS early in life (last revised at the age of 10), ESRD on HD with RUE graft/stent and renal transplant in 2013, who presents with 2 days of HA, n/v, and blurry vision. Head CT shows ventriculomegaly compared to MRI in our system from 2016 with trans-ependymal flow. Of note, she is on ASA/plavix as well as Eliquis due to her dialysis stent graft having multiple episodes of clotting off requiring intervention. (19 Jan 2020 19:28)          Allergies and Intolerances:        Allergies:  	No Known Drug Allergies:   	latex: Latex, Anaphylaxis, Rash    Home Medications:   * Outpatient Medication Status not yet specified  .    Patient History:    Past Medical, Past Surgical, and Family History:  PAST MEDICAL HISTORY:  ESRD (end stage renal disease) on dialysis     Hydrocephalus     Spina bifida.     PAST SURGICAL HISTORY:  S/P  shunt.     Tobacco Screening:  · Core Measure Site	No  · Has the patient used tobacco in the past 30 days?	No    Risk Assessment:    Present on Admission:  Deep Venous Thrombosis	no  Pulmonary Embolus	no     Heart Failure:  Does this patient have a history of or has been diagnosed with heart failure? no.    HIV Screen (per [facility] Department of Health, HIV screening must be offered to every individual between ages 13 and 64)	Unable to offer due to clinical condition      Stay Summary:  30 cc of CSF tapped, improved in neurologic examination             REVIEW OF SYSTEMS: [ ] Unable to Assess due to neurologic exam   [x ] All ROS addressed below are non-contributory, except:  Neuro: [ ] Headache [ ] Back pain [ ] Numbness [ ] Weakness [ ] Ataxia [ ] Dizziness [ ] Aphasia [ ] Dysarthria [ ] Visual disturbance  Resp: [ ] Shortness of breath/dyspnea, [ ] Orthopnea [ ] Cough  CV: [ ] Chest pain [ ] Palpitation [ ] Lightheadedness [ ] Syncope  Renal: [ ] Thirst [ ] Edema  GI: [ ] Nausea [ ] Emesis [ ] Abdominal pain [ ] Constipation [ ] Diarrhea  Hem: [ ] Hematemesis [ ] bright red blood per rectum  ID: [ ] Fever [ ] Chills [ ] Dysuria  ENT: [ ] Rhinorrhea    T(C): 37 (01-22-20 @ 07:00), Max: 37.4 (01-21-20 @ 19:00)  HR: 84 (01-22-20 @ 09:00) (68 - 92)  BP: 114/67 (01-22-20 @ 09:00) (102/62 - 122/68)  RR: 23 (01-22-20 @ 09:00) (14 - 29)  SpO2: 99% (01-22-20 @ 09:00) (97% - 100%)  01-21-20 @ 07:01  -  01-22-20 @ 07:00  --------------------------------------------------------  IN: 2733.9 mL / OUT: 150 mL / NET: 2583.9 mL    01-22-20 @ 07:01  -  01-22-20 @ 10:58  --------------------------------------------------------  IN: 309.3 mL / OUT: 325 mL / NET: -15.7 mL    acetaminophen   Tablet .. 650 milliGRAM(s) Oral every 6 hours PRN  cangrelor Infusion 2.848 MICROgram(s)/kG/Min IV Continuous <Continuous>  chlorhexidine 4% Liquid 1 Application(s) Topical <User Schedule>  epoetin linda Injectable 46617 Unit(s) IV Push once  ferrous    sulfate 325 milliGRAM(s) Oral daily  folic acid 1 milliGRAM(s) Oral daily  heparin  Injectable 5000 Unit(s) SubCutaneous every 12 hours  metoprolol tartrate 50 milliGRAM(s) Oral every 12 hours  midodrine 2.5 milliGRAM(s) Oral once  Nephro-devorah 1 Tablet(s) Oral daily  oxybutynin 5 milliGRAM(s) Oral two times a day  oxyCODONE    IR 5 milliGRAM(s) Oral every 6 hours PRN  pantoprazole    Tablet 40 milliGRAM(s) Oral before breakfast  polyethylene glycol 3350 17 Gram(s) Oral every 12 hours  senna 2 Tablet(s) Oral at bedtime    PHYSICAL EXAM:  General: Calm, laying in bed  HEENT: MMM  Neuro:  -Mental status- No acute distress, AOx3, conversational, following commands  -CN- PERRL 3mm, EOMI, tongue midline, face symmetric,   -Motor- full strength in all ext  -Sensation- intact to LT   -Coordination- no dysmetria noted  CV: RRR  Pulm: Clear to auscultation  Abd: Soft, nontender, nondistended  Ext: No edema  Skin: warm, dry        LABS:  Na: 143 (01-21 @ 02:11), 143 (01-20 @ 21:51), 140 (01-20 @ 16:23), 140 (01-19 @ 15:49)  K: 3.6 (01-21 @ 02:11), 2.8 (01-20 @ 21:51), 3.8 (01-20 @ 16:23), 5.2 (01-19 @ 15:49)  Cl: 104 (01-21 @ 02:11), 107 (01-20 @ 21:51), 102 (01-20 @ 16:23), 95 (01-19 @ 15:49)  CO2: 26 (01-21 @ 02:11), 24 (01-20 @ 21:51), 24 (01-20 @ 16:23), 25 (01-19 @ 15:49)  BUN: 18 (01-21 @ 02:11), 12 (01-20 @ 21:51), 33 (01-20 @ 16:23), 34 (01-19 @ 15:49)  Cr: 3.81 (01-21 @ 02:11), 2.70 (01-20 @ 21:51), 5.18 (01-20 @ 16:23), 5.96 (01-19 @ 15:49)  Glu: 95(01-21 @ 02:11), 109(01-20 @ 21:51), 101(01-20 @ 16:23), 85(01-19 @ 15:49)    Hgb: 8.5 (01-21 @ 02:11), 7.7 (01-20 @ 21:52), 10.2 (01-19 @ 15:49)  Hct: 27.2 (01-21 @ 02:11), 24.7 (01-20 @ 21:52), 33.0 (01-19 @ 15:49)  WBC: 10.62 (01-21 @ 02:11), 8.69 (01-20 @ 21:52), 10.34 (01-19 @ 15:49)  Plt: 281 (01-21 @ 02:11), 247 (01-20 @ 21:52), 331 (01-19 @ 15:49)    INR: 1.20 01-19-20 @ 15:49  PTT: 31.7 01-19-20 @ 15:49        EXAM:  CT BRAIN                            PROCEDURE DATE:  01/20/2020            INTERPRETATION:    CLINICAL INFORMATION: Follow-up hydrocephalus.    TECHNIQUE: Noncontrast axial CT images were acquired through the head.     COMPARISON: CT head 1/19/2020.    FINDINGS:   Right parietal approach ventriculostomy catheter with distal tip in unchanged position at the right lateral ventricle frontal horn. A catheter fragment traversing the right lateral and third third ventricles is again noted, unchanged. Linear encephalomalacia in the superior right frontal lobe along the prior catheter tract is unchanged. An overlying craniotomy defect is again noted in the frontal bone.    There is interval decrease in the size of the lateral and third ventricles, which overall remain slightly enlarged. Bilateral decreased periventricular hypodensities likely indicate commensurate decreased transependymal flow of CSF.    For low cerebellar tonsils are again visualized consistent with a Chiari malformation. Corpus callosal dysgenesis is noted    No new hemorrhage, mass effect, or shift of midline structures.    Paranasal sinuses and mastoid air cells are clear.     IMPRESSION:   Improved hydrocephalus, with mild persistent enlargement of the lateral and third ventricles, when compared with 1/19/2020. No new hemorrhage, mass effect, or midline shift.      ASSESSMENT/PLAN: 39F w/PMHx of spinal bifida, VPS early in life (last revised at the age of 10), ESRD on HD with RUE graft/stent and renal transplant in 2013, who presents with 2 days of HA, n/v, and blurry vision.  neurologic examination improved with CSF drainage    NEURO: spina bifida, VPS    CT stereo  follow P2Y12  OR Thursday   neuro checks q 1 hr   [id number]  monitor for worsening hydrocephalus   pain mgt: Tylenol and oxy 5 prn  Activity: [x] mobilize as tolerated [] Bedrest [x] PT [x] OT [] PMNR    PULM: room air   SpO2>92%  incentive spirometry   OOB    CV:  SBP goal 100-150  on cangrelor for AVfistula stent     RENAL: ESRD s/p transplant  consult transplant and nephrology  HD M/W/F   bladder scans as she still urinate  replete lytes prn    GI:  Diet: renal diet   last BM 1/20/19  GI prophylaxis [x] not indicated   Bowel regimen [x] senna [] other:    ENDO:   Goal euglycemia (-180)    HEME/ONC:  hold ASA/plavix/eliquis with possible VPS revision   cangrelor gtt  VTE prophylaxis: [] SCDs [x] chemoprophylaxis heparin 5000 units q 12 hrs     ID: afebrile   no leukocytosis    MISC:    [] Patient is at high risk of neurologic deterioration/death due to: hydrocephalus    Time seen:  Time spent: __35_ [] critical care minutes HPI:  39F w/PMHx of spinal bifida, VPS early in life (last revised at the age of 10), ESRD on HD with RUE graft/stent and renal transplant in 2013, who presents with 2 days of HA, n/v, and blurry vision. Head CT shows ventriculomegaly compared to MRI in our system from 2016 with trans-ependymal flow. Of note, she is on ASA/plavix as well as Eliquis due to her dialysis stent graft having multiple episodes of clotting off requiring intervention. (19 Jan 2020 19:28)          Allergies and Intolerances:        Allergies:  	No Known Drug Allergies:   	latex: Latex, Anaphylaxis, Rash    Home Medications:   * Outpatient Medication Status not yet specified  .    Patient History:    Past Medical, Past Surgical, and Family History:  PAST MEDICAL HISTORY:  ESRD (end stage renal disease) on dialysis     Hydrocephalus     Spina bifida.     PAST SURGICAL HISTORY:  S/P  shunt.     Tobacco Screening:  · Core Measure Site	No  · Has the patient used tobacco in the past 30 days?	No    Risk Assessment:    Present on Admission:  Deep Venous Thrombosis	no  Pulmonary Embolus	no     Heart Failure:  Does this patient have a history of or has been diagnosed with heart failure? no.    HIV Screen (per [facility] Department of Health, HIV screening must be offered to every individual between ages 13 and 64)	Unable to offer due to clinical condition      Stay Summary:  30 cc of CSF tapped, improved in neurologic examination             REVIEW OF SYSTEMS: [ ] Unable to Assess due to neurologic exam   [x ] All ROS addressed below are non-contributory, except:  Neuro: [ ] Headache [ ] Back pain [ ] Numbness [ ] Weakness [ ] Ataxia [ ] Dizziness [ ] Aphasia [ ] Dysarthria [ ] Visual disturbance  Resp: [ ] Shortness of breath/dyspnea, [ ] Orthopnea [ ] Cough  CV: [ ] Chest pain [ ] Palpitation [ ] Lightheadedness [ ] Syncope  Renal: [ ] Thirst [ ] Edema  GI: [ ] Nausea [ ] Emesis [ ] Abdominal pain [ ] Constipation [ ] Diarrhea  Hem: [ ] Hematemesis [ ] bright red blood per rectum  ID: [ ] Fever [ ] Chills [ ] Dysuria  ENT: [ ] Rhinorrhea    T(C): 37 (01-22-20 @ 07:00), Max: 37.4 (01-21-20 @ 19:00)  HR: 84 (01-22-20 @ 09:00) (68 - 92)  BP: 114/67 (01-22-20 @ 09:00) (102/62 - 122/68)  RR: 23 (01-22-20 @ 09:00) (14 - 29)  SpO2: 99% (01-22-20 @ 09:00) (97% - 100%)  01-21-20 @ 07:01  -  01-22-20 @ 07:00  --------------------------------------------------------  IN: 2733.9 mL / OUT: 150 mL / NET: 2583.9 mL    01-22-20 @ 07:01  -  01-22-20 @ 10:58  --------------------------------------------------------  IN: 309.3 mL / OUT: 325 mL / NET: -15.7 mL    acetaminophen   Tablet .. 650 milliGRAM(s) Oral every 6 hours PRN  cangrelor Infusion 2.848 MICROgram(s)/kG/Min IV Continuous <Continuous>  chlorhexidine 4% Liquid 1 Application(s) Topical <User Schedule>  epoetin linda Injectable 33180 Unit(s) IV Push once  ferrous    sulfate 325 milliGRAM(s) Oral daily  folic acid 1 milliGRAM(s) Oral daily  heparin  Injectable 5000 Unit(s) SubCutaneous every 12 hours  metoprolol tartrate 50 milliGRAM(s) Oral every 12 hours  midodrine 2.5 milliGRAM(s) Oral once  Nephro-devorah 1 Tablet(s) Oral daily  oxybutynin 5 milliGRAM(s) Oral two times a day  oxyCODONE    IR 5 milliGRAM(s) Oral every 6 hours PRN  pantoprazole    Tablet 40 milliGRAM(s) Oral before breakfast  polyethylene glycol 3350 17 Gram(s) Oral every 12 hours  senna 2 Tablet(s) Oral at bedtime    PHYSICAL EXAM:  General: Calm, laying in bed  HEENT: MMM  Neuro:  -Mental status- No acute distress, AOx3, conversational, following commands  -CN- PERRL 3mm, EOMI, tongue midline, face symmetric,   -Motor- full strength in all ext  -Sensation- intact to LT   -Coordination- no dysmetria noted  CV: RRR  Pulm: Clear to auscultation  Abd: Soft, nontender, nondistended  Ext: No edema  Skin: warm, dry        LABS:  Na: 143 (01-21 @ 02:11), 143 (01-20 @ 21:51), 140 (01-20 @ 16:23), 140 (01-19 @ 15:49)  K: 3.6 (01-21 @ 02:11), 2.8 (01-20 @ 21:51), 3.8 (01-20 @ 16:23), 5.2 (01-19 @ 15:49)  Cl: 104 (01-21 @ 02:11), 107 (01-20 @ 21:51), 102 (01-20 @ 16:23), 95 (01-19 @ 15:49)  CO2: 26 (01-21 @ 02:11), 24 (01-20 @ 21:51), 24 (01-20 @ 16:23), 25 (01-19 @ 15:49)  BUN: 18 (01-21 @ 02:11), 12 (01-20 @ 21:51), 33 (01-20 @ 16:23), 34 (01-19 @ 15:49)  Cr: 3.81 (01-21 @ 02:11), 2.70 (01-20 @ 21:51), 5.18 (01-20 @ 16:23), 5.96 (01-19 @ 15:49)  Glu: 95(01-21 @ 02:11), 109(01-20 @ 21:51), 101(01-20 @ 16:23), 85(01-19 @ 15:49)    Hgb: 8.5 (01-21 @ 02:11), 7.7 (01-20 @ 21:52), 10.2 (01-19 @ 15:49)  Hct: 27.2 (01-21 @ 02:11), 24.7 (01-20 @ 21:52), 33.0 (01-19 @ 15:49)  WBC: 10.62 (01-21 @ 02:11), 8.69 (01-20 @ 21:52), 10.34 (01-19 @ 15:49)  Plt: 281 (01-21 @ 02:11), 247 (01-20 @ 21:52), 331 (01-19 @ 15:49)    INR: 1.20 01-19-20 @ 15:49  PTT: 31.7 01-19-20 @ 15:49        EXAM:  CT BRAIN                            PROCEDURE DATE:  01/20/2020            INTERPRETATION:    CLINICAL INFORMATION: Follow-up hydrocephalus.    TECHNIQUE: Noncontrast axial CT images were acquired through the head.     COMPARISON: CT head 1/19/2020.    FINDINGS:   Right parietal approach ventriculostomy catheter with distal tip in unchanged position at the right lateral ventricle frontal horn. A catheter fragment traversing the right lateral and third third ventricles is again noted, unchanged. Linear encephalomalacia in the superior right frontal lobe along the prior catheter tract is unchanged. An overlying craniotomy defect is again noted in the frontal bone.    There is interval decrease in the size of the lateral and third ventricles, which overall remain slightly enlarged. Bilateral decreased periventricular hypodensities likely indicate commensurate decreased transependymal flow of CSF.    For low cerebellar tonsils are again visualized consistent with a Chiari malformation. Corpus callosal dysgenesis is noted    No new hemorrhage, mass effect, or shift of midline structures.    Paranasal sinuses and mastoid air cells are clear.     IMPRESSION:   Improved hydrocephalus, with mild persistent enlargement of the lateral and third ventricles, when compared with 1/19/2020. No new hemorrhage, mass effect, or midline shift.      ASSESSMENT/PLAN: 39F w/PMHx of spinal bifida, VPS early in life (last revised at the age of 10), ESRD on HD with RUE graft/stent and renal transplant in 2013, who presents with 2 days of HA, n/v, and blurry vision.  neurologic examination improved with CSF drainage    NEURO: spina bifida, VPS    CT head shows improvement in the vent size   will discuss with NS plan, she might not need a VPS revision   neuro checks q 4 hr   -150   pain mgt: Tylenol and oxy 5 prn  Activity: [x] mobilize as tolerated [] Bedrest [x] PT [x] OT [] PMNR    PULM: room air   SpO2>92%  incentive spirometry   OOB    CV:  SBP goal 100-150  on cangrelor for AVfistula stent   if no NS plan, will change cagrelor back to aspirin and plavix     RENAL: ESRD s/p transplant  consult transplant and nephrology  HD M/W/F   bladder scans as she still urinate  replete lytes prn    GI:  Diet: renal diet   last BM 1/22/19  GI prophylaxis [x] not indicated   Bowel regimen [x] senna [] other:    ENDO:   Goal euglycemia (-180)    HEME/ONC:  hold ASA/plavix/eliquis with possible VPS revision   cangrelor gtt  VTE prophylaxis: [] SCDs [x] chemoprophylaxis heparin 5000 units q 12 hrs     ID: afebrile   no leukocytosis    floor  not critical HPI:  39F w/PMHx of spinal bifida, VPS early in life (last revised at the age of 10), ESRD on HD with RUE graft/stent and renal transplant in 2013, who presents with 2 days of HA, n/v, and blurry vision. Head CT shows ventriculomegaly compared to MRI in our system from 2016 with trans-ependymal flow. Of note, she is on ASA/plavix as well as Eliquis due to her dialysis stent graft having multiple episodes of clotting off requiring intervention. (19 Jan 2020 19:28)          Allergies and Intolerances:        Allergies:  	No Known Drug Allergies:   	latex: Latex, Anaphylaxis, Rash    Home Medications:   * Outpatient Medication Status not yet specified  .    Patient History:    Past Medical, Past Surgical, and Family History:  PAST MEDICAL HISTORY:  ESRD (end stage renal disease) on dialysis     Hydrocephalus     Spina bifida.     PAST SURGICAL HISTORY:  S/P  shunt.     Tobacco Screening:  · Core Measure Site	No  · Has the patient used tobacco in the past 30 days?	No    Risk Assessment:    Present on Admission:  Deep Venous Thrombosis	no  Pulmonary Embolus	no     Heart Failure:  Does this patient have a history of or has been diagnosed with heart failure? no.    HIV Screen (per [facility] Department of Health, HIV screening must be offered to every individual between ages 13 and 64)	Unable to offer due to clinical condition      Stay Summary:  30 cc of CSF tapped, improved in neurologic examination             REVIEW OF SYSTEMS: [ ] Unable to Assess due to neurologic exam   [x ] All ROS addressed below are non-contributory, except:  Neuro: [ ] Headache [ ] Back pain [ ] Numbness [ ] Weakness [ ] Ataxia [ ] Dizziness [ ] Aphasia [ ] Dysarthria [ ] Visual disturbance  Resp: [ ] Shortness of breath/dyspnea, [ ] Orthopnea [ ] Cough  CV: [ ] Chest pain [ ] Palpitation [ ] Lightheadedness [ ] Syncope  Renal: [ ] Thirst [ ] Edema  GI: [ ] Nausea [ ] Emesis [ ] Abdominal pain [ ] Constipation [ ] Diarrhea  Hem: [ ] Hematemesis [ ] bright red blood per rectum  ID: [ ] Fever [ ] Chills [ ] Dysuria  ENT: [ ] Rhinorrhea    T(C): 37 (01-22-20 @ 07:00), Max: 37.4 (01-21-20 @ 19:00)  HR: 84 (01-22-20 @ 09:00) (68 - 92)  BP: 114/67 (01-22-20 @ 09:00) (102/62 - 122/68)  RR: 23 (01-22-20 @ 09:00) (14 - 29)  SpO2: 99% (01-22-20 @ 09:00) (97% - 100%)  01-21-20 @ 07:01  -  01-22-20 @ 07:00  --------------------------------------------------------  IN: 2733.9 mL / OUT: 150 mL / NET: 2583.9 mL    01-22-20 @ 07:01  -  01-22-20 @ 10:58  --------------------------------------------------------  IN: 309.3 mL / OUT: 325 mL / NET: -15.7 mL    acetaminophen   Tablet .. 650 milliGRAM(s) Oral every 6 hours PRN  cangrelor Infusion 2.848 MICROgram(s)/kG/Min IV Continuous <Continuous>  chlorhexidine 4% Liquid 1 Application(s) Topical <User Schedule>  epoetin linda Injectable 89826 Unit(s) IV Push once  ferrous    sulfate 325 milliGRAM(s) Oral daily  folic acid 1 milliGRAM(s) Oral daily  heparin  Injectable 5000 Unit(s) SubCutaneous every 12 hours  metoprolol tartrate 50 milliGRAM(s) Oral every 12 hours  midodrine 2.5 milliGRAM(s) Oral once  Nephro-devorah 1 Tablet(s) Oral daily  oxybutynin 5 milliGRAM(s) Oral two times a day  oxyCODONE    IR 5 milliGRAM(s) Oral every 6 hours PRN  pantoprazole    Tablet 40 milliGRAM(s) Oral before breakfast  polyethylene glycol 3350 17 Gram(s) Oral every 12 hours  senna 2 Tablet(s) Oral at bedtime    PHYSICAL EXAM:  General: Calm, laying in bed  HEENT: MMM  Neuro:  -Mental status- No acute distress, AOx3, conversational, following commands  -CN- PERRL 3mm, EOMI, tongue midline, face symmetric,   -Motor- full strength in all ext  -Sensation- intact to LT   -Coordination- no dysmetria noted  CV: RRR  Pulm: Clear to auscultation  Abd: Soft, nontender, nondistended  Ext: No edema  Skin: warm, dry        LABS:  Na: 143 (01-21 @ 02:11), 143 (01-20 @ 21:51), 140 (01-20 @ 16:23), 140 (01-19 @ 15:49)  K: 3.6 (01-21 @ 02:11), 2.8 (01-20 @ 21:51), 3.8 (01-20 @ 16:23), 5.2 (01-19 @ 15:49)  Cl: 104 (01-21 @ 02:11), 107 (01-20 @ 21:51), 102 (01-20 @ 16:23), 95 (01-19 @ 15:49)  CO2: 26 (01-21 @ 02:11), 24 (01-20 @ 21:51), 24 (01-20 @ 16:23), 25 (01-19 @ 15:49)  BUN: 18 (01-21 @ 02:11), 12 (01-20 @ 21:51), 33 (01-20 @ 16:23), 34 (01-19 @ 15:49)  Cr: 3.81 (01-21 @ 02:11), 2.70 (01-20 @ 21:51), 5.18 (01-20 @ 16:23), 5.96 (01-19 @ 15:49)  Glu: 95(01-21 @ 02:11), 109(01-20 @ 21:51), 101(01-20 @ 16:23), 85(01-19 @ 15:49)    Hgb: 8.5 (01-21 @ 02:11), 7.7 (01-20 @ 21:52), 10.2 (01-19 @ 15:49)  Hct: 27.2 (01-21 @ 02:11), 24.7 (01-20 @ 21:52), 33.0 (01-19 @ 15:49)  WBC: 10.62 (01-21 @ 02:11), 8.69 (01-20 @ 21:52), 10.34 (01-19 @ 15:49)  Plt: 281 (01-21 @ 02:11), 247 (01-20 @ 21:52), 331 (01-19 @ 15:49)    INR: 1.20 01-19-20 @ 15:49  PTT: 31.7 01-19-20 @ 15:49        EXAM:  CT BRAIN                            PROCEDURE DATE:  01/20/2020            INTERPRETATION:    CLINICAL INFORMATION: Follow-up hydrocephalus.    TECHNIQUE: Noncontrast axial CT images were acquired through the head.     COMPARISON: CT head 1/19/2020.    FINDINGS:   Right parietal approach ventriculostomy catheter with distal tip in unchanged position at the right lateral ventricle frontal horn. A catheter fragment traversing the right lateral and third third ventricles is again noted, unchanged. Linear encephalomalacia in the superior right frontal lobe along the prior catheter tract is unchanged. An overlying craniotomy defect is again noted in the frontal bone.    There is interval decrease in the size of the lateral and third ventricles, which overall remain slightly enlarged. Bilateral decreased periventricular hypodensities likely indicate commensurate decreased transependymal flow of CSF.    For low cerebellar tonsils are again visualized consistent with a Chiari malformation. Corpus callosal dysgenesis is noted    No new hemorrhage, mass effect, or shift of midline structures.    Paranasal sinuses and mastoid air cells are clear.     IMPRESSION:   Improved hydrocephalus, with mild persistent enlargement of the lateral and third ventricles, when compared with 1/19/2020. No new hemorrhage, mass effect, or midline shift.      ASSESSMENT/PLAN: 39F w/PMHx of spinal bifida, VPS early in life (last revised at the age of 10), ESRD on HD with RUE graft/stent and renal transplant in 2013, who presents with 2 days of HA, n/v, and blurry vision.  neurologic examination improved with CSF drainage    NEURO: spina bifida, VPS    CT head shows improvement in the vent size   will discuss with NS plan, she might not need a VPS revision   neuro checks q 4 hr   -150   pain mgt: Tylenol and oxy 5 prn  Activity: [x] mobilize as tolerated [] Bedrest [x] PT [x] OT [] PMNR    PULM: room air   SpO2>92%  incentive spirometry   OOB    CV:  SBP goal 100-150  on cangrelor for AVfistula stent   if no NS plan, will change cagrelor back to aspirin and plavix     RENAL: ESRD s/p transplant  consult transplant and nephrology  HD M/W/F   bladder scans as she still urinate  replete lytes prn    GI:  Diet: renal diet   last BM 1/22/19  GI prophylaxis [x] not indicated   Bowel regimen [x] senna [] other:    ENDO:   Goal euglycemia (-180)    HEME/ONC:  resume ASA/plavix/eliquis ( Dr Quinones vascular surgeon recommends continuing all 3 medications)    d/c cangrelor gtt  VTE prophylaxis: [] SCDs [x] chemoprophylaxis heparin 5000 units q 12 hrs     ID: afebrile   no leukocytosis    floor  not critical

## 2020-01-22 NOTE — PHYSICAL THERAPY INITIAL EVALUATION ADULT - PRECAUTIONS/LIMITATIONS, REHAB EVAL
fall precautions/HISTORY AND RESULTS CONTINUED: CT Brain: Right parietal  shunt catheter and retained right frontal shunt catheter. Post shunt revision there is decreased hydrocephalus compared with 1/21/2020. Chiari II malformation. Dopplers (-).

## 2020-01-22 NOTE — PHYSICAL THERAPY INITIAL EVALUATION ADULT - GENERAL OBSERVATIONS, REHAB EVAL
Pt jonathan 40 min eval well. Pt rec'd in bed, ICU monitoring, family at bedside. Pt with h/o club feet and pt reports missing L heel.

## 2020-01-23 ENCOUNTER — TRANSCRIPTION ENCOUNTER (OUTPATIENT)
Age: 40
End: 2020-01-23

## 2020-01-23 ENCOUNTER — APPOINTMENT (OUTPATIENT)
Dept: NEUROSURGERY | Facility: HOSPITAL | Age: 40
End: 2020-01-23

## 2020-01-23 VITALS
HEART RATE: 62 BPM | DIASTOLIC BLOOD PRESSURE: 62 MMHG | TEMPERATURE: 98 F | RESPIRATION RATE: 18 BRPM | OXYGEN SATURATION: 98 % | SYSTOLIC BLOOD PRESSURE: 108 MMHG

## 2020-01-23 LAB
ANION GAP SERPL CALC-SCNC: 15 MMOL/L — SIGNIFICANT CHANGE UP (ref 5–17)
BUN SERPL-MCNC: 13 MG/DL — SIGNIFICANT CHANGE UP (ref 7–23)
CALCIUM SERPL-MCNC: 8.8 MG/DL — SIGNIFICANT CHANGE UP (ref 8.4–10.5)
CHLORIDE SERPL-SCNC: 97 MMOL/L — SIGNIFICANT CHANGE UP (ref 96–108)
CO2 SERPL-SCNC: 28 MMOL/L — SIGNIFICANT CHANGE UP (ref 22–31)
CREAT SERPL-MCNC: 4.13 MG/DL — HIGH (ref 0.5–1.3)
GLUCOSE SERPL-MCNC: 100 MG/DL — HIGH (ref 70–99)
HCT VFR BLD CALC: 28.5 % — LOW (ref 34.5–45)
HGB BLD-MCNC: 8.9 G/DL — LOW (ref 11.5–15.5)
MAGNESIUM SERPL-MCNC: 2.1 MG/DL — SIGNIFICANT CHANGE UP (ref 1.6–2.6)
MCHC RBC-ENTMCNC: 30.9 PG — SIGNIFICANT CHANGE UP (ref 27–34)
MCHC RBC-ENTMCNC: 31.2 GM/DL — LOW (ref 32–36)
MCV RBC AUTO: 99 FL — SIGNIFICANT CHANGE UP (ref 80–100)
PHOSPHATE SERPL-MCNC: 4.4 MG/DL — SIGNIFICANT CHANGE UP (ref 2.5–4.5)
PLATELET # BLD AUTO: 287 K/UL — SIGNIFICANT CHANGE UP (ref 150–400)
POTASSIUM SERPL-MCNC: 3.4 MMOL/L — LOW (ref 3.5–5.3)
POTASSIUM SERPL-SCNC: 3.4 MMOL/L — LOW (ref 3.5–5.3)
RBC # BLD: 2.88 M/UL — LOW (ref 3.8–5.2)
RBC # FLD: 15 % — HIGH (ref 10.3–14.5)
SODIUM SERPL-SCNC: 140 MMOL/L — SIGNIFICANT CHANGE UP (ref 135–145)
WBC # BLD: 6.96 K/UL — SIGNIFICANT CHANGE UP (ref 3.8–10.5)
WBC # FLD AUTO: 6.96 K/UL — SIGNIFICANT CHANGE UP (ref 3.8–10.5)

## 2020-01-23 PROCEDURE — 97161 PT EVAL LOW COMPLEX 20 MIN: CPT

## 2020-01-23 PROCEDURE — 83970 ASSAY OF PARATHORMONE: CPT

## 2020-01-23 PROCEDURE — 82803 BLOOD GASES ANY COMBINATION: CPT

## 2020-01-23 PROCEDURE — 71045 X-RAY EXAM CHEST 1 VIEW: CPT

## 2020-01-23 PROCEDURE — 82945 GLUCOSE OTHER FLUID: CPT

## 2020-01-23 PROCEDURE — 86803 HEPATITIS C AB TEST: CPT

## 2020-01-23 PROCEDURE — 89051 BODY FLUID CELL COUNT: CPT

## 2020-01-23 PROCEDURE — 84157 ASSAY OF PROTEIN OTHER: CPT

## 2020-01-23 PROCEDURE — 70450 CT HEAD/BRAIN W/O DYE: CPT

## 2020-01-23 PROCEDURE — 86704 HEP B CORE ANTIBODY TOTAL: CPT

## 2020-01-23 PROCEDURE — 82435 ASSAY OF BLOOD CHLORIDE: CPT

## 2020-01-23 PROCEDURE — 82947 ASSAY GLUCOSE BLOOD QUANT: CPT

## 2020-01-23 PROCEDURE — C9460: CPT

## 2020-01-23 PROCEDURE — 99285 EMERGENCY DEPT VISIT HI MDM: CPT | Mod: 25

## 2020-01-23 PROCEDURE — 84484 ASSAY OF TROPONIN QUANT: CPT

## 2020-01-23 PROCEDURE — 86706 HEP B SURFACE ANTIBODY: CPT

## 2020-01-23 PROCEDURE — 85576 BLOOD PLATELET AGGREGATION: CPT

## 2020-01-23 PROCEDURE — 85610 PROTHROMBIN TIME: CPT

## 2020-01-23 PROCEDURE — 99261: CPT

## 2020-01-23 PROCEDURE — 81025 URINE PREGNANCY TEST: CPT

## 2020-01-23 PROCEDURE — 86900 BLOOD TYPING SEROLOGIC ABO: CPT

## 2020-01-23 PROCEDURE — 84132 ASSAY OF SERUM POTASSIUM: CPT

## 2020-01-23 PROCEDURE — 85730 THROMBOPLASTIN TIME PARTIAL: CPT

## 2020-01-23 PROCEDURE — 83036 HEMOGLOBIN GLYCOSYLATED A1C: CPT

## 2020-01-23 PROCEDURE — 85014 HEMATOCRIT: CPT

## 2020-01-23 PROCEDURE — 86901 BLOOD TYPING SEROLOGIC RH(D): CPT

## 2020-01-23 PROCEDURE — 82330 ASSAY OF CALCIUM: CPT

## 2020-01-23 PROCEDURE — 83735 ASSAY OF MAGNESIUM: CPT

## 2020-01-23 PROCEDURE — 74018 RADEX ABDOMEN 1 VIEW: CPT

## 2020-01-23 PROCEDURE — 80048 BASIC METABOLIC PNL TOTAL CA: CPT

## 2020-01-23 PROCEDURE — 83605 ASSAY OF LACTIC ACID: CPT

## 2020-01-23 PROCEDURE — 87340 HEPATITIS B SURFACE AG IA: CPT

## 2020-01-23 PROCEDURE — 84100 ASSAY OF PHOSPHORUS: CPT

## 2020-01-23 PROCEDURE — 84295 ASSAY OF SERUM SODIUM: CPT

## 2020-01-23 PROCEDURE — 86850 RBC ANTIBODY SCREEN: CPT

## 2020-01-23 PROCEDURE — 87205 SMEAR GRAM STAIN: CPT

## 2020-01-23 PROCEDURE — 93970 EXTREMITY STUDY: CPT

## 2020-01-23 PROCEDURE — 93306 TTE W/DOPPLER COMPLETE: CPT

## 2020-01-23 PROCEDURE — 80053 COMPREHEN METABOLIC PANEL: CPT

## 2020-01-23 PROCEDURE — 97166 OT EVAL MOD COMPLEX 45 MIN: CPT

## 2020-01-23 PROCEDURE — 85027 COMPLETE CBC AUTOMATED: CPT

## 2020-01-23 PROCEDURE — 99239 HOSP IP/OBS DSCHRG MGMT >30: CPT

## 2020-01-23 PROCEDURE — 70250 X-RAY EXAM OF SKULL: CPT

## 2020-01-23 PROCEDURE — 82310 ASSAY OF CALCIUM: CPT

## 2020-01-23 PROCEDURE — 87070 CULTURE OTHR SPECIMN AEROBIC: CPT

## 2020-01-23 RX ORDER — OXYBUTYNIN CHLORIDE 5 MG
1 TABLET ORAL
Qty: 0 | Refills: 0 | DISCHARGE

## 2020-01-23 RX ORDER — FOLIC ACID 0.8 MG
1 TABLET ORAL
Qty: 0 | Refills: 0 | DISCHARGE

## 2020-01-23 RX ORDER — MULTIVIT-MIN/FERROUS GLUCONATE 9 MG/15 ML
1 LIQUID (ML) ORAL
Qty: 0 | Refills: 0 | DISCHARGE

## 2020-01-23 RX ORDER — POLYETHYLENE GLYCOL 3350 17 G/17G
17 POWDER, FOR SOLUTION ORAL
Qty: 0 | Refills: 0 | DISCHARGE
Start: 2020-01-23

## 2020-01-23 RX ORDER — ACETAMINOPHEN 500 MG
2 TABLET ORAL
Qty: 0 | Refills: 0 | DISCHARGE
Start: 2020-01-23

## 2020-01-23 RX ORDER — SENNA PLUS 8.6 MG/1
2 TABLET ORAL
Qty: 0 | Refills: 0 | DISCHARGE
Start: 2020-01-23

## 2020-01-23 RX ADMIN — Medication 1 MILLIGRAM(S): at 11:24

## 2020-01-23 RX ADMIN — Medication 325 MILLIGRAM(S): at 11:25

## 2020-01-23 RX ADMIN — PANTOPRAZOLE SODIUM 40 MILLIGRAM(S): 20 TABLET, DELAYED RELEASE ORAL at 06:09

## 2020-01-23 RX ADMIN — Medication 5 MILLIGRAM(S): at 06:09

## 2020-01-23 RX ADMIN — CLOPIDOGREL BISULFATE 75 MILLIGRAM(S): 75 TABLET, FILM COATED ORAL at 11:24

## 2020-01-23 RX ADMIN — Medication 81 MILLIGRAM(S): at 11:24

## 2020-01-23 RX ADMIN — Medication 1 TABLET(S): at 11:24

## 2020-01-23 RX ADMIN — APIXABAN 2.5 MILLIGRAM(S): 2.5 TABLET, FILM COATED ORAL at 06:09

## 2020-01-23 NOTE — DISCHARGE NOTE NURSING/CASE MANAGEMENT/SOCIAL WORK - PATIENT PORTAL LINK FT
You can access the FollowMyHealth Patient Portal offered by [facility] by registering at the following website: [email or web address]. By joining [employer]’s FollowMyHealth portal, you will also be able to view your health information using other applications (apps) compatible with our system.

## 2020-01-23 NOTE — DISCHARGE NOTE PROVIDER - NSDCFUADDINST_GEN_ALL_CORE_FT
s/p ventriculoperitoneal shunt tap  No strenuous activity.  No lifting.  Do not return to work until cleared to do so by physician.  No driving until cleared to do so by physician.  keep incisions clean and dry. do not get incision wet until post op day #4. do not soak in water. no baths. pat dry. do not apply cream/moisture to incisions.  items for follow up: wound check, ?follow up imaging  follow up with neurosurgery, Dr. Dos Santos, on 1/29/2020 at 11am, [address] [facility], [place], [address], call [phone]  follow up with your vascular surgeon within 1 week of discharge  follow up with you podiatrist within 1 week of discharge  follow up with PMD within 1 week of discharge s/p ventriculoperitoneal shunt tap  No strenuous activity.  No lifting.  Do not return to work until cleared to do so by physician.  No driving until cleared to do so by physician.  keep incisions clean and dry. do not get incision wet until post op day #4. do not soak in water. no baths. pat dry. do not apply cream/moisture to incisions.  items for follow up: wound check, ?follow up imaging  follow up with neurosurgery, Dr. Dos Santos, on 1/29/2020 at 11am, [address] [facility], [place], [address], call [phone]  follow up with your vascular surgeon within 1 week of discharge  follow up with you podiatrist within 1 week of discharge, you can also make appointment with podiatry, Dr. Burton, within 1 week of discharge instead, [address], [address], [place], [address], call [phone]  follow up with PMD within 1 week of discharge

## 2020-01-23 NOTE — CONSULT NOTE ADULT - SUBJECTIVE AND OBJECTIVE BOX
Podiatry pager #: [phone] ([place])/ [phone] ([facility])    Patient is a 39y old  Female who presents with a chief complaint of Concern for shunt failure (23 Jan 2020 11:25)      HPI:  39F w/PMHx of spinal bifida, VPS early in life (last revised at the age of 10), ESRD on HD with RUE graft/stent and renal transplant in 2013, who presents with 2 days of HA, n/v, and blurry vision. Head CT shows ventriculomegaly compared to MRI in our system from 2016 with trans-ependymal flow. Of note, she is on ASA/plavix as well as Eliquis due to her dialysis stent graft having multiple episodes of clotting off requiring intervention. (19 Jan 2020 19:28)      PAST MEDICAL & SURGICAL HISTORY:  ESRD (end stage renal disease) on dialysis  Spina bifida  Hydrocephalus  S/P  shunt      MEDICATIONS  (STANDING):  apixaban 2.5 milliGRAM(s) Oral every 12 hours  aspirin  chewable 81 milliGRAM(s) Oral daily  clopidogrel Tablet 75 milliGRAM(s) Oral daily  ferrous    sulfate 325 milliGRAM(s) Oral daily  folic acid 1 milliGRAM(s) Oral daily  metoprolol tartrate 50 milliGRAM(s) Oral every 12 hours  Nephro-devorah 1 Tablet(s) Oral daily  oxybutynin 5 milliGRAM(s) Oral two times a day  pantoprazole    Tablet 40 milliGRAM(s) Oral before breakfast  polyethylene glycol 3350 17 Gram(s) Oral every 12 hours  senna 2 Tablet(s) Oral at bedtime    MEDICATIONS  (PRN):  acetaminophen   Tablet .. 650 milliGRAM(s) Oral every 6 hours PRN Mild Pain (1 - 3)  oxyCODONE    IR 5 milliGRAM(s) Oral every 6 hours PRN Severe Pain (7 - 10)      Allergies    latex (Anaphylaxis; Rash)  No Known Drug Allergies    Intolerances        VITALS:    Vital Signs Last 24 Hrs  T(C): 36.7 (23 Jan 2020 12:19), Max: 37.6 (22 Jan 2020 22:45)  T(F): 98 (23 Jan 2020 12:19), Max: 99.6 (22 Jan 2020 22:45)  HR: 62 (23 Jan 2020 12:19) (59 - 117)  BP: 108/62 (23 Jan 2020 12:19) (91/58 - 125/96)  BP(mean): 77 (22 Jan 2020 22:45) (77 - 105)  RR: 18 (23 Jan 2020 12:19) (18 - 24)  SpO2: 98% (23 Jan 2020 12:19) (97% - 100%)    LABS:                          8.9    6.96  )-----------( 287      ( 23 Jan 2020 09:21 )             28.5       01-23    140  |  97  |  13  ----------------------------<  100<H>  3.4<L>   |  28  |  4.13<H>    Ca    8.8      23 Jan 2020 07:35  Phos  4.4     01-23  Mg     2.1     01-23        CAPILLARY BLOOD GLUCOSE              LOWER EXTREMITY PHYSICAL EXAM:    Vascular: DP/PT 1/4 B/L, CFT <3 seconds B/L, Temperature gradient warm to cool B/L  Neuro: Epicritic sensation diminished to the level of ankle B/L  Musculoskeletal/Ortho: calcaneal gait 2/2 spina bifida  Skin: L plantar heel callus w/ underlying wound  Wound #1: left foot  Location: plantar heel   Size: 3 x 3 cm  Depth: subQ  Wound bed: fibrogranular  Drainage: none  Odor: none  Periwound: hyperkeratotic  Etiology: neurologic, pressure    RADIOLOGY & ADDITIONAL STUDIES:  No foot XR were taking during this admission.

## 2020-01-23 NOTE — DISCHARGE NOTE PROVIDER - NSDCACTIVITY_GEN_ALL_CORE
No heavy lifting/straining/Do not drive or operate machinery/Do not make important decisions/Walking - Indoors allowed

## 2020-01-23 NOTE — DISCHARGE NOTE PROVIDER - CARE PROVIDER_API CALL
Meño Dos Santos)  Neurological Surgery  [address] [place] [address]  Phone: [phone]  Fax: [phone]  Follow Up Time:

## 2020-01-23 NOTE — PROGRESS NOTE ADULT - ASSESSMENT
39F w/PMHx of spinal bifida, VPS early in life (last revised at the age of 10), ESRD on HD with RUE graft/stent and renal transplant in 2013, who presents with 2 days of HA, n/v, and blurry vision. Head CT shows ventriculomegaly compared to MRI in our system from 2016 with trans-ependymal flow. Of note, she is on ASA/plavix as well as Eliquis due to her dialysis stent graft having multiple episodes of clotting off requiring intervention. admitted for  shunt revision.     1- esrd  2- HTN   3- shpt  4- anemia         hd am   epogen [address] U tiw   is on asa plavix and eliquis,   i have contacted her vascular surgeon spoke to his PA in detail and concern of all these AC use together. i have been informed to cont with asa/plavix/eliquis  metoprolol 50 mg bid

## 2020-01-23 NOTE — OCCUPATIONAL THERAPY INITIAL EVALUATION ADULT - LIVES WITH, PROFILE
Pt lives with mom is an apt, +ramp, elevator. Uses motorized wheelchair for work. Self propelled wheelchair for community use. Walks without a device at home and locally. +Shower chair, tub with grab bars/parents

## 2020-01-23 NOTE — DISCHARGE NOTE PROVIDER - HOSPITAL COURSE
Patient is a 39 year old female with prior  shunt placed who presented with headache, nausea, vomiting, and blurry vision.  CT head done which showed ventriculomegaly compared to prior imaging.  Admitted for further management.  Shunt was tapped and 30cc removed with significant improvement of symptoms.  Repeat imaging was done which showed decreased ventriculomegaly compared to initial ct scan.  Patient now back to her neurologic baseline.  Was seen by renal and has been receiving dialysis via RUE AV graft.  Upon admission patient was placed on cangrelor drip due to av graft stent and once decision was made for no surgery patient was placed back on her aspirin, plavix, and eliquis.  Has chronic left heel ulcer that was seen by wound care who requested podiatry consult who said ..........  Physical therapy evaluated patient and said no skilled pt needs upon discharge and gave patient a straight cane.  Occupational therapy evaluated patient and said no skilled ot needs upon discharge.  Patient stable for discharge.            Greater than 30 minutes were spent educating the patient and family regarding condition, medications, follow up plans, signs and symptoms to be concerned with, preparing paperwork, and questions answered regarding discharge. Patient is a 39 year old female with prior  shunt placed who presented with headache, nausea, vomiting, and blurry vision.  CT head done which showed ventriculomegaly compared to prior imaging.  Admitted for further management.  Shunt was tapped and 30cc removed with significant improvement of symptoms.  Repeat imaging was done which showed decreased ventriculomegaly compared to initial ct scan.  Patient now back to her neurologic baseline.  Was seen by renal and has been receiving dialysis via RUE AV graft.  Upon admission patient was placed on cangrelor drip due to av graft stent and once decision was made for no surgery patient was placed back on her aspirin, plavix, and eliquis.  Has chronic left heel ulcer that was seen by wound care who requested podiatry consult who debrided callous/ulcer at bedside.  Instructed patient to have close outpatient follow up with her private podiatrist or Dr. Burton (within 1 week of discharge).  Patient states she called and made appointment for next week.  Physical therapy evaluated patient and said no skilled pt needs upon discharge and gave patient a straight cane.  Occupational therapy evaluated patient and said no skilled ot needs upon discharge.  Patient stable for discharge.            Greater than 30 minutes were spent educating the patient and family regarding condition, medications, follow up plans, signs and symptoms to be concerned with, preparing paperwork, and questions answered regarding discharge.

## 2020-01-23 NOTE — DISCHARGE NOTE PROVIDER - NSDCMRMEDTOKEN_GEN_ALL_CORE_FT
Aspirin Enteric Coated 81 mg oral delayed release tablet: 1 tab(s) orally once a day  clopidogrel 75 mg oral tablet: 1 tab(s) orally once a day  Eliquis 2.5 mg oral tablet: 1 tab(s) orally 2 times a day  Fish Oil 1000 mg oral capsule: 1 cap(s) orally once a day  folic acid 1 mg oral tablet: 1 tab(s) orally once a day  High Potency oral tablet: 1 tab(s) orally once a day  methenamine hippurate 1 g oral tablet: 1 tab(s) orally once a day  metoprolol succinate 100 mg oral tablet, extended release: 1 tab(s) orally once a day  Nephro-Ksenia Rx oral tablet: 1 tab(s) orally once a day  oxybutynin 15 mg/24 hr oral tablet, extended release: 1 tab(s) orally once a day  pantoprazole 40 mg oral delayed release tablet: 1 tab(s) orally once a day acetaminophen 325 mg oral tablet: 2 tab(s) orally every 6 hours, As needed, Mild Pain (1 - 3)  Aspirin Enteric Coated 81 mg oral delayed release tablet: 1 tab(s) orally once a day  clopidogrel 75 mg oral tablet: 1 tab(s) orally once a day  Eliquis 2.5 mg oral tablet: 1 tab(s) orally 2 times a day  ferrous sulfate 325 mg (65 mg elemental iron) oral delayed release tablet: 1 tab(s) orally once a day  Fish Oil 1000 mg oral capsule: 1 cap(s) orally once a day  methenamine hippurate 1 g oral tablet: 1 tab(s) orally once a day  metoprolol succinate 100 mg oral tablet, extended release: 1 tab(s) orally once a day  pantoprazole 40 mg oral delayed release tablet: 1 tab(s) orally once a day  polyethylene glycol 3350 oral powder for reconstitution: 17 gram(s) orally every 12 hours  senna oral tablet: 2 tab(s) orally once a day (at bedtime)

## 2020-01-23 NOTE — CONSULT NOTE ADULT - ASSESSMENT
39F PMH Spina Bifida, hydrocephalus w/ L plantar heel wound   -Pt seen and evaluated  -Labs/vitals stable, no leukocytosis  -L plantar heel callus w/ underlying wound. Sharp excisional debridement of callus using #15 blade to level of but not beyond subQ tissue. No drainage or purulence, no acute signs of infection, wound chronic and stable in nature.  -Pt has a hx of partial calcanectomy on L foot, has seen podiatrist Dr. Lopez in the past for wound care but has not seen him in 3 years. Instructed pt to follow up w/ podiatry within 1 week for continued wound care as wound has re-opened.  -No podiatric surgical intervention at this time, should not hold up discharge for today however pt does need strict outpatient follow up and wound care and x-rays to evaluate for possible osteomyelitis  -Follow up w/ outside pod or w/ Dr. Burton at the wound care center ([address] [facility], [address]) within the week, call [phone] for appointment.  -Discussed w/ attending

## 2020-01-23 NOTE — DISCHARGE NOTE PROVIDER - NSDCCPCAREPLAN_GEN_ALL_CORE_FT
PRINCIPAL DISCHARGE DIAGNOSIS  Diagnosis: Hydrocephalus in adult  Assessment and Plan of Treatment: s/p ventriculoperitoneal shunt tap  renal restrictions diet  No strenuous activity.  No lifting.  Do not return to work until cleared to do so by physician.  No driving until cleared to do so by physician.  keep incisions clean and dry. do not get incision wet until post op day #4. do not soak in water. no baths. pat dry. do not apply cream/moisture to incisions.  items for follow up: wound check, ?follow up imaging  follow up with neurosurgery, Dr. Dos Santos, on 1/29/2020 at 11am, [address] [facility], [place], [address], call [phone]  follow up with your vascular surgeon within 1 week of discharge  follow up with you podiatrist within 1 week of discharge  follow up with PMD within 1 week of discharge PRINCIPAL DISCHARGE DIAGNOSIS  Diagnosis: Hydrocephalus in adult  Assessment and Plan of Treatment: s/p ventriculoperitoneal shunt tap  renal restrictions diet  No strenuous activity.  No lifting.  Do not return to work until cleared to do so by physician.  No driving until cleared to do so by physician.  keep incisions clean and dry. do not get incision wet until post op day #4. do not soak in water. no baths. pat dry. do not apply cream/moisture to incisions.  items for follow up: wound check, ?follow up imaging  follow up with neurosurgery, Dr. Dos Santos, on 1/29/2020 at 11am, [address] [facility], [place], [address], call [phone]  follow up with your vascular surgeon within 1 week of discharge  follow up with you podiatrist within 1 week of discharge, you can also make appointment with podiatry, Dr. Burton, within 1 week of discharge instead, [address], [address], [place], [address], call [phone]  follow up with PMD within 1 week of discharge

## 2020-01-23 NOTE — PROGRESS NOTE ADULT - REASON FOR ADMISSION
Concern for shunt failure

## 2020-01-23 NOTE — DISCHARGE NOTE PROVIDER - NSDCFUSCHEDAPPT_GEN_ALL_CORE_FT
[facility] PEL[facility]O ; 01/23/2020 ; [facility] [address] [facility] ; 01/29/2020 ; [facility] [address] [facility] PELAYO ; 01/23/2020 ; [facility] [address] [facility] ; 01/29/2020 ; [facility] [address] [facility]

## 2020-01-23 NOTE — PROGRESS NOTE ADULT - SUBJECTIVE AND OBJECTIVE BOX
HPI:  Patient is a 39 year old female with prior  shunt placed who presented with headache, nausea, vomiting, and blurry vision.  CT head done which showed ventriculomegaly compared to prior imaging.  Admitted for further management.      OVERNIGHT EVENTS:  No acute events overnight.  Neurologic exam remains stable.  Tolerating diet with no difficulties.  Most recent CT scan stable.  No new complaints.    Vital Signs Last 24 Hrs  T(C): 37.1 (23 Jan 2020 08:23), Max: 37.6 (22 Jan 2020 22:45)  T(F): 98.8 (23 Jan 2020 08:23), Max: 99.6 (22 Jan 2020 22:45)  HR: 103 (23 Jan 2020 10:51) (59 - 117)  BP: 103/64 (23 Jan 2020 10:51) (91/58 - 125/96)  BP(mean): 77 (22 Jan 2020 22:45) (77 - 105)  RR: 18 (23 Jan 2020 08:23) (18 - 24)  SpO2: 97% (23 Jan 2020 10:51) (97% - 100%)        PHYSICAL EXAM:  Neurological: awake, alert, oriented x3, follows commands, speech clear and fluent, perrl, eomi, face symmetric, tongue midline, no drift b/l, moves all extremities x4 w/ 5/5 strength throughout except b/l PF weakness (patient's baseline), sensation present, intact, equal throughout    Cardiovascular: +s1, s2  Respiratory: clear to auscultation b/l  Gastrointestinal: soft, non-distended, non-tender  Genitourinary: +voiding  Extremities: chronic left heel ulcer clean and dry, no signs of infection,+dp/pt pulses palpable b/l  Incision/Wound: crani shunt tap site c/d/i    TUBES/LINES:  [x] none    DIET:  [x] renal restrictions    LABS:                        8.9    6.96  )-----------( 287      ( 23 Jan 2020 09:21 )             28.5     01-23    140  |  97  |  13  ----------------------------<  100<H>  3.4<L>   |  28  |  4.13<H>    Ca    8.8      23 Jan 2020 07:35  Phos  4.4     01-23  Mg     2.1     01-23      Allergies    latex (Anaphylaxis; Rash)  No Known Drug Allergies        MEDICATIONS:  Antibiotics:  none    Neuro:  acetaminophen   Tablet .. 650 milliGRAM(s) Oral every 6 hours PRN  oxyCODONE    IR 5 milliGRAM(s) Oral every 6 hours PRN    Anticoagulation:  apixaban 2.5 milliGRAM(s) Oral every 12 hours  aspirin  chewable 81 milliGRAM(s) Oral daily  clopidogrel Tablet 75 milliGRAM(s) Oral daily    OTHER:  metoprolol tartrate 50 milliGRAM(s) Oral every 12 hours  oxybutynin 5 milliGRAM(s) Oral two times a day  pantoprazole    Tablet 40 milliGRAM(s) Oral before breakfast  polyethylene glycol 3350 17 Gram(s) Oral every 12 hours  senna 2 Tablet(s) Oral at bedtime    IVF:  ferrous    sulfate 325 milliGRAM(s) Oral daily  folic acid 1 milliGRAM(s) Oral daily  Nephro-devorah 1 Tablet(s) Oral daily    CULTURES:  Culture Results:   No growth (01-19 @ 21:49)    RADIOLOGY & ADDITIONAL TESTS:

## 2020-01-23 NOTE — PROGRESS NOTE ADULT - ASSESSMENT
HPI:  Patient is a 39 year old female with prior  shunt placed who presented with headache, nausea, vomiting, and blurry vision.  CT head done which showed ventriculomegaly compared to prior imaging.  Admitted for further management.      PLAN:  -neuro exam stable  -continue aspirin, plavix, and eliquis for av graft stent as per patient's vascular surgeon  -oxycodone and tylenol for pain control, however patient has not required any in past 24 hours  -miralax and senna for bowel regimen  -renal restrictions diet  -HD on M/W/F schedule  -left heel ulcer with no signs of infection, patient states she has had it for years and follows with a podiatrist who she will see upon discharge  -out of bed with assistance  -pt - no skilled pt needs for discharge, straight cane given to patient  -ot - no skilled ot needs for discharge  -dispo planning    Spectra #[id number]                Assessment:  Please Check When Present   []  GCS  E   V  M     Heart Failure: []Acute, [] acute on chronic , []chronic  Heart Failure:  [] Diastolic (HFpEF), [] Systolic (HFrEF), []Combined (HFpEF and HFrEF), [] RHF, [] Pulm HTN, [] Other    [] ROC, [] ATN, [] AIN, [] other  [] CKD1, [] CKD2, [] CKD 3, [] CKD 4, [] CKD 5, []ESRD    Encephalopathy: [] Metabolic, [] Hepatic, [] toxic, [] Neurological, [] Other    Abnormal Nurtitional Status: [] malnurtition (see nutrition note), [ ]underweight: BMI < 19, [] morbid obesity: BMI >40, [] Cachexia    [] Sepsis  [] hypovolemic shock,[] cardiogenic shock, [] hemorrhagic shock, [] neuogenic shock  [] Acute Respiratory Failure  []Cerebral edema, [] Brain compression/ herniation,   [] Functional quadriplegia  [] Acute blood loss anemia

## 2020-01-23 NOTE — PROGRESS NOTE ADULT - SUBJECTIVE AND OBJECTIVE BOX
[facility]   [phone]  RENAL FOLLOW UP NOTE  --------------------------------------------------------------------------------  Chief Complaint:    24 hour events/subjective:    seen earlier. states feels well no specific c/o offered  family at bedside     PAST HISTORY  --------------------------------------------------------------------------------  No significant changes to PMH, PSH, FHx, SHx, unless otherwise noted    ALLERGIES & MEDICATIONS  --------------------------------------------------------------------------------  Allergies    latex (Anaphylaxis; Rash)  No Known Drug Allergies    Intolerances      Standing Inpatient Medications  apixaban 2.5 milliGRAM(s) Oral every 12 hours  aspirin  chewable 81 milliGRAM(s) Oral daily  clopidogrel Tablet 75 milliGRAM(s) Oral daily  ferrous    sulfate 325 milliGRAM(s) Oral daily  folic acid 1 milliGRAM(s) Oral daily  metoprolol tartrate 50 milliGRAM(s) Oral every 12 hours  Nephro-devorah 1 Tablet(s) Oral daily  oxybutynin 5 milliGRAM(s) Oral two times a day  pantoprazole    Tablet 40 milliGRAM(s) Oral before breakfast  polyethylene glycol 3350 17 Gram(s) Oral every 12 hours  senna 2 Tablet(s) Oral at bedtime    PRN Inpatient Medications  acetaminophen   Tablet .. 650 milliGRAM(s) Oral every 6 hours PRN  oxyCODONE    IR 5 milliGRAM(s) Oral every 6 hours PRN      REVIEW OF SYSTEMS  --------------------------------------------------------------------------------    Gen: denies fevers/chills,  CVS: denies chest pain/palpitations  Resp: denies SOB/Cough  GI: Denies N/V/Abd pain  : Denies dysuria    All other systems were reviewed and are negative, except as noted.    VITALS/PHYSICAL EXAM  --------------------------------------------------------------------------------  T(C): 36.7 (01-23-20 @ 12:19), Max: 37.6 (01-22-20 @ 22:45)  HR: 62 (01-23-20 @ 12:19) (59 - 117)  BP: 108/62 (01-23-20 @ 12:19) (91/58 - 114/72)  RR: 18 (01-23-20 @ 12:19) (18 - 24)  SpO2: 98% (01-23-20 @ 12:19) (97% - 100%)  Wt(kg): --        01-22-20 @ 07:01  -  01-23-20 @ 07:00  --------------------------------------------------------  IN: 2483.7 mL / OUT: 2925 mL / NET: -441.3 mL    01-23-20 @ 07:01  -  01-23-20 @ 16:56  --------------------------------------------------------  IN: 830 mL / OUT: 0 mL / NET: 830 mL      Physical Exam:  	  Gen: Non toxic comfortable appearing   	no jvd  	Pulm: decrease bs  no rales or ronchi or wheezing  	CV: RRR, S1S2; no rub  	Abd: +BS, soft, nontender/nondistended  	: No suprapubic tenderness  	UE: Warm, no cyanosis  no clubbing,  no edema  	LE: Warm, no cyanosis  no clubbing, no edema  	Neuro: alert and oriented. speech coherent   	avg + bruit and thrill         LABS/STUDIES  --------------------------------------------------------------------------------              8.9    6.96  >-----------<  287      [01-23-20 @ 09:21]              28.5     140  |  97  |  13  ----------------------------<  100      [01-23-20 @ 07:35]  3.4   |  28  |  4.13        Ca     8.8     [01-23-20 @ 07:35]      Mg     2.1     [01-23-20 @ 07:35]      Phos  4.4     [01-23-20 @ 07:35]            Creatinine Trend:  SCr 4.13 [01-23 @ 07:35]  SCr 3.11 [01-22 @ 21:55]  SCr 5.44 [01-21 @ 21:21]  SCr 3.81 [01-21 @ 02:11]  SCr 2.70 [01-20 @ 21:51]                  PTH -- (Ca 8.5)      [01-20-20 @ 18:51]   334  HbA1c 5.4      [01-22-20 @ 00:04]

## 2020-01-23 NOTE — OCCUPATIONAL THERAPY INITIAL EVALUATION ADULT - ADDITIONAL COMMENTS
CT Head 1/22- Right parietal  shunt catheter and retained right frontal shunt catheter. Post shunt revision there is decreased hydrocephalus compared with 1/21/2020. Chiari II malformation.

## 2020-01-29 ENCOUNTER — APPOINTMENT (OUTPATIENT)
Dept: NEUROSURGERY | Facility: CLINIC | Age: 40
End: 2020-01-29
Payer: COMMERCIAL

## 2020-01-29 PROCEDURE — 99213 OFFICE O/P EST LOW 20 MIN: CPT

## 2020-02-15 ENCOUNTER — APPOINTMENT (OUTPATIENT)
Dept: MRI IMAGING | Facility: HOSPITAL | Age: 40
End: 2020-02-15
Payer: COMMERCIAL

## 2020-02-15 ENCOUNTER — OUTPATIENT (OUTPATIENT)
Dept: OUTPATIENT SERVICES | Facility: HOSPITAL | Age: 40
LOS: 1 days | End: 2020-02-15
Payer: COMMERCIAL

## 2020-02-15 DIAGNOSIS — Z98.2 PRESENCE OF CEREBROSPINAL FLUID DRAINAGE DEVICE: Chronic | ICD-10-CM

## 2020-02-15 DIAGNOSIS — Z00.00 ENCOUNTER FOR GENERAL ADULT MEDICAL EXAMINATION WITHOUT ABNORMAL FINDINGS: ICD-10-CM

## 2020-02-15 DIAGNOSIS — R51 HEADACHE: ICD-10-CM

## 2020-02-15 PROCEDURE — 72148 MRI LUMBAR SPINE W/O DYE: CPT

## 2020-02-15 PROCEDURE — 72148 MRI LUMBAR SPINE W/O DYE: CPT | Mod: 26

## 2020-02-15 PROCEDURE — 72146 MRI CHEST SPINE W/O DYE: CPT | Mod: 26

## 2020-02-15 PROCEDURE — 72146 MRI CHEST SPINE W/O DYE: CPT

## 2020-02-27 ENCOUNTER — APPOINTMENT (OUTPATIENT)
Dept: CT IMAGING | Facility: IMAGING CENTER | Age: 40
End: 2020-02-27
Payer: COMMERCIAL

## 2020-02-27 ENCOUNTER — OUTPATIENT (OUTPATIENT)
Dept: OUTPATIENT SERVICES | Facility: HOSPITAL | Age: 40
LOS: 1 days | End: 2020-02-27
Payer: COMMERCIAL

## 2020-02-27 DIAGNOSIS — Z98.2 PRESENCE OF CEREBROSPINAL FLUID DRAINAGE DEVICE: Chronic | ICD-10-CM

## 2020-02-27 DIAGNOSIS — Z00.8 ENCOUNTER FOR OTHER GENERAL EXAMINATION: ICD-10-CM

## 2020-02-27 PROCEDURE — 70450 CT HEAD/BRAIN W/O DYE: CPT

## 2020-02-27 PROCEDURE — 70450 CT HEAD/BRAIN W/O DYE: CPT | Mod: 26

## 2020-03-11 ENCOUNTER — APPOINTMENT (OUTPATIENT)
Dept: NEUROSURGERY | Facility: CLINIC | Age: 40
End: 2020-03-11
Payer: COMMERCIAL

## 2020-03-11 DIAGNOSIS — R53.1 WEAKNESS: ICD-10-CM

## 2020-03-11 PROCEDURE — 99214 OFFICE O/P EST MOD 30 MIN: CPT

## 2020-03-11 NOTE — REASON FOR VISIT
[FreeTextEntry1] : 40yo female with congenital HCP s/p  shunt, last revision ~30 years ago, had a shunt malfunction that was treated with a  shunt tap on 1/23/20 (resolved with no surgical intervention) and has been followed in the office for the last several months. Patient on last office visit noted her UE had paresthesias that were non specific along with persistent low back pain that has worsened over the last several years. An o/p MRI T/L spine was ordered to evaluate for tethering. CT head was also ordered for surveillance of her last shunt malfunction. Since then shes been doing well with no issues and changes to her previous complaints. She straight caths since childhood (no changes) and her UE paresthesias involve the entire arm B/L with "pin and needle" like sensory changes. She is also dropping objects more frequently, difficulty with fine motor tasks, hard to open jars. She is still ambulatory with a hx of low spina bifida with myelo repair as an infant.

## 2020-03-11 NOTE — PHYSICAL EXAM
[FreeTextEntry1] : Awake, alert, NAD and oriented x3\par Speech appropriate, no dysarthria or difficulties, normal comprehension \par CNs II-XII grossly intact\par - PERRL, EOMI\par - No nystagmus or gaze preferences \par - Face sensation present and motor grossly symmetric, no droop\par - Hearing grossly intact bilaterally \par - Tongue midline, uvula not deviated \par - Shoulder shrug and head turning present bilaterally\par Motor symmetric and non-focal with UE strength, no deficits present \par Lower extremities a 4-/5 throughout (at baseline)\par Sensation grossly intact bilaterally \par Reflexes present and documented below, no Hoffmans or clonus \par Gait tested, normal tandem and no difficulties ambulating\par Romberg testing negative\par Cerebellar function appropriate without dysmetria on finger-nose or heel-shin testing [1+] : Patella right 1+ [Larson] : Larson's sign was not demonstrated

## 2020-03-11 NOTE — RESULTS/DATA
[FreeTextEntry1] : MRI T spine noted, no acute process. MRI L spine noted for low lying conus @ L4 consistent with tethering. CT head noted with small decreased ventricular size and stable to prior imaging when there was no ventricular dilitation, no findings of shunt failure.

## 2020-05-13 NOTE — OCCUPATIONAL THERAPY INITIAL EVALUATION ADULT - PATIENT/FAMILY/SIGNIFICANT OTHER GOALS STATEMENT, OT EVAL
Quality 137: Melanoma: Continuity Of Care - Recall System: Patient information entered into a recall system that includes: target date for the next exam specified AND a process to follow up with patients regarding missed or unscheduled appointments
When Should The Patient Follow-Up For Their Next Full-Body Skin Exam?: 1 Year
to go home
Detail Level: Detailed
Detail Level: Simple

## 2020-07-09 ENCOUNTER — APPOINTMENT (OUTPATIENT)
Dept: NEUROLOGY | Facility: CLINIC | Age: 40
End: 2020-07-09
Payer: COMMERCIAL

## 2020-07-09 PROCEDURE — 95886 MUSC TEST DONE W/N TEST COMP: CPT

## 2020-07-09 PROCEDURE — 95912 NRV CNDJ TEST 11-12 STUDIES: CPT

## 2020-07-09 PROCEDURE — 95885 MUSC TST DONE W/NERV TST LIM: CPT | Mod: 59

## 2020-07-29 ENCOUNTER — APPOINTMENT (OUTPATIENT)
Dept: NEUROSURGERY | Facility: CLINIC | Age: 40
End: 2020-07-29
Payer: COMMERCIAL

## 2020-07-29 PROCEDURE — 99214 OFFICE O/P EST MOD 30 MIN: CPT

## 2020-07-29 NOTE — ASSESSMENT
[FreeTextEntry1] : - upper extremity dysethesia most likley due to bilat carpal tunnel syndrome. We recommend gabapentin\par - increasing LE symptoms caused by tethering. Surgical tethering would be an option, however patient prefers wo wait and see how things develop\par - next f/u in 6 month

## 2020-07-29 NOTE — REASON FOR VISIT
[Parent] : parent [Follow-Up: _____] : a [unfilled] follow-up visit [FreeTextEntry1] : F/u after UE and LE EMG.\par \par Patient was born with Chiari type II, open bifid spine, that required surgery at birth and congenital hydrocephalus, that required VPS; since birth she had several Shunt revision. She has a low lying conus at L4. \par \par Her chief complaint is that of left more than tight upper extremity dysesthesia. No weakness. She has been intermittently using a wheelchair since age 8. Her ability to walk has been getting worse. She also has non radicular bilateral lower extremity burning pain. She has been self-cathing her whole life. At this point she can walk 100 feet with some assistance.

## 2020-09-10 ENCOUNTER — RESULT REVIEW (OUTPATIENT)
Age: 40
End: 2020-09-10

## 2021-02-25 ENCOUNTER — APPOINTMENT (OUTPATIENT)
Dept: NEUROSURGERY | Facility: CLINIC | Age: 41
End: 2021-02-25
Payer: COMMERCIAL

## 2021-02-25 VITALS
RESPIRATION RATE: 16 BRPM | OXYGEN SATURATION: 94 % | SYSTOLIC BLOOD PRESSURE: 162 MMHG | HEART RATE: 102 BPM | DIASTOLIC BLOOD PRESSURE: 86 MMHG

## 2021-02-25 DIAGNOSIS — Z98.2 PRESENCE OF CEREBROSPINAL FLUID DRAINAGE DEVICE: ICD-10-CM

## 2021-02-25 DIAGNOSIS — Z76.82 AWAITING ORGAN TRANSPLANT STATUS: ICD-10-CM

## 2021-02-25 PROCEDURE — 99072 ADDL SUPL MATRL&STAF TM PHE: CPT

## 2021-02-25 PROCEDURE — 99213 OFFICE O/P EST LOW 20 MIN: CPT

## 2021-02-25 RX ORDER — GABAPENTIN 300 MG/1
300 CAPSULE ORAL
Refills: 0 | Status: ACTIVE | COMMUNITY
Start: 2021-02-25

## 2021-02-25 NOTE — REVIEW OF SYSTEMS
[Confused or Disoriented] : no confusion [Hand Weakness] :  hand weakness [Numbness] : numbness [Migraine Headache] : migraine headaches [Difficulty Walking] : difficulty walking [Limb Pain] : limb pain [Skin Lesions] : skin lesion [Negative] : Heme/Lymph [de-identified] : left heel wound.

## 2021-02-25 NOTE — REASON FOR VISIT
[Follow-Up: _____] : a [unfilled] follow-up visit [Parent] : parent [FreeTextEntry1] : Patient with new headaches after dialysis (M-W-F) lasting 24 hours three times a week. Some have migraine like features, photosensitive, nausea (vomiting x1). She takes tylenol pm for the headaches and to fall asleep.\par The headaches also began approximately the same time as her hypertension was uncontrolled. She is currently having her nifedipine increased. BP today in the office 162/86. She took her BP meds 90 minutes prior.\par \par Patient with mild b/l hand discomfort when typing and before bed which is well managed with the gabapentin.\par \par .

## 2021-02-25 NOTE — PHYSICAL EXAM
[General Appearance - Alert] : alert [General Appearance - In No Acute Distress] : in no acute distress [Oriented To Time, Place, And Person] : oriented to person, place, and time [Impaired Insight] : insight and judgment were intact [Affect] : the affect was normal [Person] : oriented to person [Place] : oriented to place [Time] : oriented to time [Short Term Intact] : short term memory intact [Remote Intact] : remote memory intact [Span Intact] : the attention span was normal [Concentration Intact] : normal concentrating ability [Fluency] : fluency intact [Comprehension] : comprehension intact [Current Events] : adequate knowledge of current events [Past History] : adequate knowledge of personal past history [Vocabulary] : adequate range of vocabulary [Cranial Nerves Optic (II)] : visual acuity intact bilaterally,  pupils equal round and reactive to light [Cranial Nerves Oculomotor (III)] : extraocular motion intact [Cranial Nerves Trigeminal (V)] : facial sensation intact symmetrically [Cranial Nerves Facial (VII)] : face symmetrical [Cranial Nerves Vestibulocochlear (VIII)] : hearing was intact bilaterally [Cranial Nerves Glossopharyngeal (IX)] : tongue and palate midline [Cranial Nerves Accessory (XI - Cranial And Spinal)] : head turning and shoulder shrug symmetric [Cranial Nerves Hypoglossal (XII)] : there was no tongue deviation with protrusion [Motor Tone] : muscle tone was normal in all four extremities [Motor Strength] : muscle strength was normal in all four extremities [No Muscle Atrophy] : normal bulk in all four extremities [Sensation Tactile Decrease] : light touch was intact [Limited Balance] : the patient's balance was impaired [Past-pointing] : there was no past-pointing [Tremor] : no tremor present [1+] : Brachioradialis left 1+ [FreeTextEntry8] : ataxic gait [Sclera] : the sclera and conjunctiva were normal [PERRL With Normal Accommodation] : pupils were equal in size, round, reactive to light, with normal accommodation [Extraocular Movements] : extraocular movements were intact [Outer Ear] : the ears and nose were normal in appearance [Oropharynx] : the oropharynx was normal [FreeTextEntry1] : ataxic gait, unsteady (baseline) [Skin Color & Pigmentation] : normal skin color and pigmentation [Skin Turgor] : normal skin turgor [] : no rash

## 2021-02-25 NOTE — HISTORY OF PRESENT ILLNESS
[FreeTextEntry1] : Patient was born with Chiari type II, open bifid spine, that required surgery at birth and congenital hydrocephalus, that required VPS; since birth she had several Shunt revision. She has a low lying conus at L4. \par \par Her chief complaint is that of left more than tight upper extremity dysesthesia. No weakness. She has been intermittently using a wheelchair since age 8. Her ability to walk has been getting worse. She also has non radicular bilateral lower extremity burning pain. She has been self-cathing her whole life. At this point she can walk 100 feet with some assistance.\par \par She has been on dialysis for the past 2 years (M-W-F) via right upper arm fistula. She is currently on the transplant list.

## 2021-02-25 NOTE — ASSESSMENT
[FreeTextEntry1] : Discussion:\par - Reviewed symptoms.\par - Ordered CT scan head no contrast.\par - Follow-up with OV after imaging.\par - Reviewed EMG results. Patient with well managed discomfort of carpal tunnel symptoms. No intervention indicted at this time. Continue Gabapentin. Consider b/l wrist splints in the future if symptoms worsen.\par

## 2021-03-11 ENCOUNTER — APPOINTMENT (OUTPATIENT)
Dept: CT IMAGING | Facility: IMAGING CENTER | Age: 41
End: 2021-03-11

## 2021-03-22 NOTE — ED PROVIDER NOTE - CONSTITUTIONAL DISTRESS
Arrhythmia    Asthma    Cataract of right eye    Depression    DM (diabetes mellitus)    GERD (gastroesophageal reflux disease)    HTN (hypertension)    Obese    Palpitations    Seasonal allergies    
no apparent

## 2022-05-26 ENCOUNTER — APPOINTMENT (OUTPATIENT)
Dept: CT IMAGING | Facility: IMAGING CENTER | Age: 42
End: 2022-05-26
Payer: COMMERCIAL

## 2022-05-26 ENCOUNTER — OUTPATIENT (OUTPATIENT)
Dept: OUTPATIENT SERVICES | Facility: HOSPITAL | Age: 42
LOS: 1 days | End: 2022-05-26
Payer: COMMERCIAL

## 2022-05-26 DIAGNOSIS — G91.9 HYDROCEPHALUS, UNSPECIFIED: ICD-10-CM

## 2022-05-26 DIAGNOSIS — Z98.2 PRESENCE OF CEREBROSPINAL FLUID DRAINAGE DEVICE: Chronic | ICD-10-CM

## 2022-05-26 PROCEDURE — 70450 CT HEAD/BRAIN W/O DYE: CPT

## 2022-05-26 PROCEDURE — 70450 CT HEAD/BRAIN W/O DYE: CPT | Mod: 26

## 2022-06-29 NOTE — H&P ADULT - NSICDXPASTMEDICALHX_GEN_ALL_CORE_FT
PAST MEDICAL HISTORY:  ESRD (end stage renal disease) on dialysis     Hydrocephalus     Spina bifida
constant

## 2022-07-07 ENCOUNTER — APPOINTMENT (OUTPATIENT)
Dept: NEUROSURGERY | Facility: CLINIC | Age: 42
End: 2022-07-07

## 2022-07-07 DIAGNOSIS — G43.909 MIGRAINE, UNSPECIFIED, NOT INTRACTABLE, W/OUT STATUS MIGRAINOSUS: ICD-10-CM

## 2022-07-07 DIAGNOSIS — G91.9 HYDROCEPHALUS, UNSPECIFIED: ICD-10-CM

## 2022-07-07 PROCEDURE — 99212 OFFICE O/P EST SF 10 MIN: CPT

## 2022-07-07 NOTE — HISTORY OF PRESENT ILLNESS
[FreeTextEntry1] : Kina Loving is a pleasant 41 year old lady who presents for a follow up after CT scan to evaluate VPS and symptoms of headaches.  She presents today with c/o headaches at times that are relieved with dark environment.\par \par She has a RUE AVF and has HD on M-W-F.  States that 2 weeks ago while on the HD machine that she had a severe headache 10/10 with vomiting and elevated BP.  States that Gabapentin helps her to sleep.\par \par Patient does not have a Neurologist and was referred to Dr. Shawn Elliott.\par \par Pt states that she had VPS from birth with a revision at age 10 .  At age 30 her shunt malfunctioned and was tapped.  She did not need surgery and has not had any additional episodes of shunt failure since.  Pt and mom report that when shunt failed symptoms included vision changes, severe headaches, pt was repeating words and  confusion.

## 2022-07-07 NOTE — ASSESSMENT
[FreeTextEntry1] : IMPRESSION:\par 1. Hydrocephalus.  \par \par \par \par \par PLAN:\par 1. No need for a new CT scan unless new symptoms present.\par 2. \par

## 2022-09-05 ENCOUNTER — INPATIENT (INPATIENT)
Facility: HOSPITAL | Age: 42
LOS: 3 days | Discharge: HOME | DRG: 853 | End: 2022-09-09
Attending: STUDENT IN AN ORGANIZED HEALTH CARE EDUCATION/TRAINING PROGRAM | Admitting: HOSPITALIST
Payer: MEDICARE

## 2022-09-05 VITALS
DIASTOLIC BLOOD PRESSURE: 115 MMHG | SYSTOLIC BLOOD PRESSURE: 191 MMHG | HEART RATE: 107 BPM | RESPIRATION RATE: 26 BRPM | OXYGEN SATURATION: 86 % | HEIGHT: 60 IN

## 2022-09-05 DIAGNOSIS — Q05.9 SPINA BIFIDA, UNSPECIFIED: ICD-10-CM

## 2022-09-05 DIAGNOSIS — D72.829 ELEVATED WHITE BLOOD CELL COUNT, UNSPECIFIED: ICD-10-CM

## 2022-09-05 DIAGNOSIS — Z98.2 PRESENCE OF CEREBROSPINAL FLUID DRAINAGE DEVICE: Chronic | ICD-10-CM

## 2022-09-05 DIAGNOSIS — J81.0 ACUTE PULMONARY EDEMA: ICD-10-CM

## 2022-09-05 DIAGNOSIS — E87.70 FLUID OVERLOAD, UNSPECIFIED: ICD-10-CM

## 2022-09-05 DIAGNOSIS — A41.9 SEPSIS, UNSPECIFIED ORGANISM: ICD-10-CM

## 2022-09-05 DIAGNOSIS — R06.02 SHORTNESS OF BREATH: ICD-10-CM

## 2022-09-05 DIAGNOSIS — N18.6 END STAGE RENAL DISEASE: ICD-10-CM

## 2022-09-05 DIAGNOSIS — Z29.9 ENCOUNTER FOR PROPHYLACTIC MEASURES, UNSPECIFIED: ICD-10-CM

## 2022-09-05 DIAGNOSIS — D64.9 ANEMIA, UNSPECIFIED: ICD-10-CM

## 2022-09-05 DIAGNOSIS — Z94.0 KIDNEY TRANSPLANT STATUS: Chronic | ICD-10-CM

## 2022-09-05 LAB
ALBUMIN SERPL ELPH-MCNC: 4.3 G/DL — SIGNIFICANT CHANGE UP (ref 3.3–5)
ALP SERPL-CCNC: 263 U/L — HIGH (ref 40–120)
ALT FLD-CCNC: 16 U/L — SIGNIFICANT CHANGE UP (ref 10–45)
ANION GAP SERPL CALC-SCNC: 17 MMOL/L — SIGNIFICANT CHANGE UP (ref 5–17)
ANION GAP SERPL CALC-SCNC: 19 MMOL/L — HIGH (ref 5–17)
APPEARANCE UR: ABNORMAL
AST SERPL-CCNC: 12 U/L — SIGNIFICANT CHANGE UP (ref 10–40)
BACTERIA # UR AUTO: NEGATIVE — SIGNIFICANT CHANGE UP
BASE EXCESS BLDV CALC-SCNC: 0.6 MMOL/L — SIGNIFICANT CHANGE UP (ref -2–3)
BASOPHILS # BLD AUTO: 0.1 K/UL — SIGNIFICANT CHANGE UP (ref 0–0.2)
BASOPHILS NFR BLD AUTO: 0.6 % — SIGNIFICANT CHANGE UP (ref 0–2)
BILIRUB SERPL-MCNC: 0.3 MG/DL — SIGNIFICANT CHANGE UP (ref 0.2–1.2)
BILIRUB UR-MCNC: NEGATIVE — SIGNIFICANT CHANGE UP
BUN SERPL-MCNC: 15 MG/DL — SIGNIFICANT CHANGE UP (ref 7–23)
BUN SERPL-MCNC: 48 MG/DL — HIGH (ref 7–23)
CA-I SERPL-SCNC: 1.06 MMOL/L — LOW (ref 1.15–1.33)
CALCIUM SERPL-MCNC: 8.9 MG/DL — SIGNIFICANT CHANGE UP (ref 8.4–10.5)
CALCIUM SERPL-MCNC: 9.3 MG/DL — SIGNIFICANT CHANGE UP (ref 8.4–10.5)
CHLORIDE BLDV-SCNC: 102 MMOL/L — SIGNIFICANT CHANGE UP (ref 96–108)
CHLORIDE SERPL-SCNC: 94 MMOL/L — LOW (ref 96–108)
CHLORIDE SERPL-SCNC: 99 MMOL/L — SIGNIFICANT CHANGE UP (ref 96–108)
CO2 BLDV-SCNC: 29 MMOL/L — HIGH (ref 22–26)
CO2 SERPL-SCNC: 24 MMOL/L — SIGNIFICANT CHANGE UP (ref 22–31)
CO2 SERPL-SCNC: 27 MMOL/L — SIGNIFICANT CHANGE UP (ref 22–31)
COLOR SPEC: SIGNIFICANT CHANGE UP
CREAT SERPL-MCNC: 4.08 MG/DL — HIGH (ref 0.5–1.3)
CREAT SERPL-MCNC: 9.47 MG/DL — HIGH (ref 0.5–1.3)
DIFF PNL FLD: NEGATIVE — SIGNIFICANT CHANGE UP
EGFR: 13 ML/MIN/1.73M2 — LOW
EGFR: 5 ML/MIN/1.73M2 — LOW
EOSINOPHIL # BLD AUTO: 1.38 K/UL — HIGH (ref 0–0.5)
EOSINOPHIL NFR BLD AUTO: 7.9 % — HIGH (ref 0–6)
EPI CELLS # UR: 3 /HPF — SIGNIFICANT CHANGE UP
FLUAV AG NPH QL: SIGNIFICANT CHANGE UP
FLUBV AG NPH QL: SIGNIFICANT CHANGE UP
GAS PNL BLDV: 136 MMOL/L — SIGNIFICANT CHANGE UP (ref 136–145)
GAS PNL BLDV: SIGNIFICANT CHANGE UP
GAS PNL BLDV: SIGNIFICANT CHANGE UP
GLUCOSE BLDV-MCNC: 129 MG/DL — HIGH (ref 70–99)
GLUCOSE SERPL-MCNC: 129 MG/DL — HIGH (ref 70–99)
GLUCOSE SERPL-MCNC: 154 MG/DL — HIGH (ref 70–99)
GLUCOSE UR QL: ABNORMAL
HBV CORE AB SER-ACNC: SIGNIFICANT CHANGE UP
HBV SURFACE AB SER-ACNC: 127.2 MIU/ML — SIGNIFICANT CHANGE UP
HBV SURFACE AB SER-ACNC: REACTIVE
HBV SURFACE AG SER-ACNC: SIGNIFICANT CHANGE UP
HCO3 BLDV-SCNC: 27 MMOL/L — SIGNIFICANT CHANGE UP (ref 22–29)
HCT VFR BLD CALC: 33.3 % — LOW (ref 34.5–45)
HCT VFR BLDA CALC: 32 % — LOW (ref 34.5–46.5)
HCV AB S/CO SERPL IA: 0.2 S/CO — SIGNIFICANT CHANGE UP (ref 0–0.99)
HCV AB SERPL-IMP: SIGNIFICANT CHANGE UP
HGB BLD CALC-MCNC: 10.7 G/DL — LOW (ref 11.7–16.1)
HGB BLD-MCNC: 10.4 G/DL — LOW (ref 11.5–15.5)
HOROWITZ INDEX BLDV+IHG-RTO: SIGNIFICANT CHANGE UP
HYALINE CASTS # UR AUTO: 8 /LPF — HIGH (ref 0–2)
IMM GRANULOCYTES NFR BLD AUTO: 0.7 % — SIGNIFICANT CHANGE UP (ref 0–1.5)
KETONES UR-MCNC: NEGATIVE — SIGNIFICANT CHANGE UP
LACTATE BLDV-MCNC: 1 MMOL/L — SIGNIFICANT CHANGE UP (ref 0.5–2)
LACTATE BLDV-MCNC: 1 MMOL/L — SIGNIFICANT CHANGE UP (ref 0.5–2)
LACTATE BLDV-MCNC: 2.3 MMOL/L — HIGH (ref 0.5–2)
LEUKOCYTE ESTERASE UR-ACNC: ABNORMAL
LYMPHOCYTES # BLD AUTO: 1.17 K/UL — SIGNIFICANT CHANGE UP (ref 1–3.3)
LYMPHOCYTES # BLD AUTO: 6.7 % — LOW (ref 13–44)
MAGNESIUM SERPL-MCNC: 2.7 MG/DL — HIGH (ref 1.6–2.6)
MCHC RBC-ENTMCNC: 31.2 GM/DL — LOW (ref 32–36)
MCHC RBC-ENTMCNC: 32 PG — SIGNIFICANT CHANGE UP (ref 27–34)
MCV RBC AUTO: 102.5 FL — HIGH (ref 80–100)
MONOCYTES # BLD AUTO: 0.62 K/UL — SIGNIFICANT CHANGE UP (ref 0–0.9)
MONOCYTES NFR BLD AUTO: 3.6 % — SIGNIFICANT CHANGE UP (ref 2–14)
NEUTROPHILS # BLD AUTO: 14.02 K/UL — HIGH (ref 1.8–7.4)
NEUTROPHILS NFR BLD AUTO: 80.5 % — HIGH (ref 43–77)
NITRITE UR-MCNC: NEGATIVE — SIGNIFICANT CHANGE UP
NRBC # BLD: 0 /100 WBCS — SIGNIFICANT CHANGE UP (ref 0–0)
NT-PROBNP SERPL-SCNC: 5293 PG/ML — HIGH (ref 0–300)
PCO2 BLDV: 53 MMHG — HIGH (ref 39–42)
PH BLDV: 7.32 — SIGNIFICANT CHANGE UP (ref 7.32–7.43)
PH UR: 8.5 — HIGH (ref 5–8)
PHOSPHATE SERPL-MCNC: 5.5 MG/DL — HIGH (ref 2.5–4.5)
PLATELET # BLD AUTO: 341 K/UL — SIGNIFICANT CHANGE UP (ref 150–400)
PO2 BLDV: 37 MMHG — SIGNIFICANT CHANGE UP (ref 25–45)
POTASSIUM BLDV-SCNC: 6.1 MMOL/L — HIGH (ref 3.5–5.1)
POTASSIUM SERPL-MCNC: 3.7 MMOL/L — SIGNIFICANT CHANGE UP (ref 3.5–5.3)
POTASSIUM SERPL-MCNC: 6.1 MMOL/L — HIGH (ref 3.5–5.3)
POTASSIUM SERPL-SCNC: 3.7 MMOL/L — SIGNIFICANT CHANGE UP (ref 3.5–5.3)
POTASSIUM SERPL-SCNC: 6.1 MMOL/L — HIGH (ref 3.5–5.3)
PROCALCITONIN SERPL-MCNC: 1.01 NG/ML — HIGH (ref 0.02–0.1)
PROT SERPL-MCNC: 8.4 G/DL — HIGH (ref 6–8.3)
PROT UR-MCNC: ABNORMAL
RBC # BLD: 3.25 M/UL — LOW (ref 3.8–5.2)
RBC # FLD: 14 % — SIGNIFICANT CHANGE UP (ref 10.3–14.5)
RBC CASTS # UR COMP ASSIST: 3 /HPF — SIGNIFICANT CHANGE UP (ref 0–4)
RSV RNA NPH QL NAA+NON-PROBE: SIGNIFICANT CHANGE UP
SAO2 % BLDV: 60 % — LOW (ref 67–88)
SARS-COV-2 RNA SPEC QL NAA+PROBE: SIGNIFICANT CHANGE UP
SODIUM SERPL-SCNC: 140 MMOL/L — SIGNIFICANT CHANGE UP (ref 135–145)
SODIUM SERPL-SCNC: 140 MMOL/L — SIGNIFICANT CHANGE UP (ref 135–145)
SP GR SPEC: 1.01 — LOW (ref 1.01–1.02)
TROPONIN T, HIGH SENSITIVITY RESULT: 19 NG/L — SIGNIFICANT CHANGE UP (ref 0–51)
UROBILINOGEN FLD QL: NEGATIVE — SIGNIFICANT CHANGE UP
WBC # BLD: 17.42 K/UL — HIGH (ref 3.8–10.5)
WBC # FLD AUTO: 17.42 K/UL — HIGH (ref 3.8–10.5)
WBC UR QL: 123 /HPF — HIGH (ref 0–5)

## 2022-09-05 PROCEDURE — 99223 1ST HOSP IP/OBS HIGH 75: CPT | Mod: GC

## 2022-09-05 PROCEDURE — 99285 EMERGENCY DEPT VISIT HI MDM: CPT

## 2022-09-05 PROCEDURE — 71045 X-RAY EXAM CHEST 1 VIEW: CPT | Mod: 26

## 2022-09-05 RX ORDER — FOLIC ACID 0.8 MG
1 TABLET ORAL
Qty: 0 | Refills: 0 | DISCHARGE

## 2022-09-05 RX ORDER — GABAPENTIN 400 MG/1
1 CAPSULE ORAL
Qty: 0 | Refills: 0 | DISCHARGE

## 2022-09-05 RX ORDER — SEVELAMER CARBONATE 2400 MG/1
1 POWDER, FOR SUSPENSION ORAL
Qty: 0 | Refills: 0 | DISCHARGE

## 2022-09-05 RX ORDER — FERROUS SULFATE 325(65) MG
1 TABLET ORAL
Qty: 0 | Refills: 0 | DISCHARGE

## 2022-09-05 RX ORDER — FOLIC ACID 0.8 MG
1 TABLET ORAL DAILY
Refills: 0 | Status: DISCONTINUED | OUTPATIENT
Start: 2022-09-05 | End: 2022-09-09

## 2022-09-05 RX ORDER — CLOPIDOGREL BISULFATE 75 MG/1
1 TABLET, FILM COATED ORAL
Qty: 0 | Refills: 0 | DISCHARGE

## 2022-09-05 RX ORDER — APIXABAN 2.5 MG/1
2.5 TABLET, FILM COATED ORAL EVERY 12 HOURS
Refills: 0 | Status: DISCONTINUED | OUTPATIENT
Start: 2022-09-05 | End: 2022-09-09

## 2022-09-05 RX ORDER — OXYBUTYNIN CHLORIDE 5 MG
15 TABLET ORAL DAILY
Refills: 0 | Status: DISCONTINUED | OUTPATIENT
Start: 2022-09-05 | End: 2022-09-05

## 2022-09-05 RX ORDER — SODIUM ZIRCONIUM CYCLOSILICATE 10 G/10G
10 POWDER, FOR SUSPENSION ORAL ONCE
Refills: 0 | Status: COMPLETED | OUTPATIENT
Start: 2022-09-05 | End: 2022-09-05

## 2022-09-05 RX ORDER — ACETAMINOPHEN 500 MG
1000 TABLET ORAL EVERY 8 HOURS
Refills: 0 | Status: DISCONTINUED | OUTPATIENT
Start: 2022-09-05 | End: 2022-09-06

## 2022-09-05 RX ORDER — AMLODIPINE BESYLATE 2.5 MG/1
1 TABLET ORAL
Qty: 0 | Refills: 0 | DISCHARGE

## 2022-09-05 RX ORDER — LANOLIN ALCOHOL/MO/W.PET/CERES
5 CREAM (GRAM) TOPICAL AT BEDTIME
Refills: 0 | Status: DISCONTINUED | OUTPATIENT
Start: 2022-09-05 | End: 2022-09-09

## 2022-09-05 RX ORDER — INFLUENZA VIRUS VACCINE 15; 15; 15; 15 UG/.5ML; UG/.5ML; UG/.5ML; UG/.5ML
0.5 SUSPENSION INTRAMUSCULAR ONCE
Refills: 0 | Status: DISCONTINUED | OUTPATIENT
Start: 2022-09-05 | End: 2022-09-09

## 2022-09-05 RX ORDER — APIXABAN 2.5 MG/1
1 TABLET, FILM COATED ORAL
Qty: 0 | Refills: 0 | DISCHARGE

## 2022-09-05 RX ORDER — CEFTRIAXONE 500 MG/1
1000 INJECTION, POWDER, FOR SOLUTION INTRAMUSCULAR; INTRAVENOUS EVERY 24 HOURS
Refills: 0 | Status: DISCONTINUED | OUTPATIENT
Start: 2022-09-06 | End: 2022-09-09

## 2022-09-05 RX ORDER — GABAPENTIN 400 MG/1
100 CAPSULE ORAL AT BEDTIME
Refills: 0 | Status: DISCONTINUED | OUTPATIENT
Start: 2022-09-05 | End: 2022-09-09

## 2022-09-05 RX ORDER — ASPIRIN/CALCIUM CARB/MAGNESIUM 324 MG
1 TABLET ORAL
Qty: 0 | Refills: 0 | DISCHARGE

## 2022-09-05 RX ORDER — IPRATROPIUM/ALBUTEROL SULFATE 18-103MCG
3 AEROSOL WITH ADAPTER (GRAM) INHALATION
Refills: 0 | Status: COMPLETED | OUTPATIENT
Start: 2022-09-05 | End: 2022-09-05

## 2022-09-05 RX ORDER — PANTOPRAZOLE SODIUM 20 MG/1
1 TABLET, DELAYED RELEASE ORAL
Qty: 0 | Refills: 0 | DISCHARGE

## 2022-09-05 RX ORDER — OMEGA-3 ACID ETHYL ESTERS 1 G
1 CAPSULE ORAL
Qty: 0 | Refills: 0 | DISCHARGE

## 2022-09-05 RX ORDER — OMEGA-3 ACID ETHYL ESTERS 1 G
1 CAPSULE ORAL EVERY 24 HOURS
Refills: 0 | Status: DISCONTINUED | OUTPATIENT
Start: 2022-09-05 | End: 2022-09-09

## 2022-09-05 RX ORDER — FERROUS SULFATE 325(65) MG
325 TABLET ORAL
Refills: 0 | Status: DISCONTINUED | OUTPATIENT
Start: 2022-09-05 | End: 2022-09-09

## 2022-09-05 RX ORDER — ASPIRIN/CALCIUM CARB/MAGNESIUM 324 MG
81 TABLET ORAL DAILY
Refills: 0 | Status: DISCONTINUED | OUTPATIENT
Start: 2022-09-05 | End: 2022-09-09

## 2022-09-05 RX ORDER — CEFTRIAXONE 500 MG/1
1000 INJECTION, POWDER, FOR SOLUTION INTRAMUSCULAR; INTRAVENOUS ONCE
Refills: 0 | Status: COMPLETED | OUTPATIENT
Start: 2022-09-05 | End: 2022-09-05

## 2022-09-05 RX ORDER — VANCOMYCIN HCL 1 G
1000 VIAL (EA) INTRAVENOUS ONCE
Refills: 0 | Status: DISCONTINUED | OUTPATIENT
Start: 2022-09-05 | End: 2022-09-05

## 2022-09-05 RX ORDER — PIPERACILLIN AND TAZOBACTAM 4; .5 G/20ML; G/20ML
3.38 INJECTION, POWDER, LYOPHILIZED, FOR SOLUTION INTRAVENOUS ONCE
Refills: 0 | Status: DISCONTINUED | OUTPATIENT
Start: 2022-09-05 | End: 2022-09-05

## 2022-09-05 RX ORDER — METHENAMINE MANDELATE 1 G
1 TABLET ORAL
Qty: 0 | Refills: 0 | DISCHARGE

## 2022-09-05 RX ORDER — CLOPIDOGREL BISULFATE 75 MG/1
75 TABLET, FILM COATED ORAL DAILY
Refills: 0 | Status: DISCONTINUED | OUTPATIENT
Start: 2022-09-05 | End: 2022-09-09

## 2022-09-05 RX ORDER — METOPROLOL TARTRATE 50 MG
1 TABLET ORAL
Qty: 0 | Refills: 0 | DISCHARGE

## 2022-09-05 RX ORDER — BACITRACIN ZINC 500 UNIT/G
1 OINTMENT IN PACKET (EA) TOPICAL DAILY
Refills: 0 | Status: DISCONTINUED | OUTPATIENT
Start: 2022-09-05 | End: 2022-09-09

## 2022-09-05 RX ORDER — AMLODIPINE BESYLATE 2.5 MG/1
10 TABLET ORAL DAILY
Refills: 0 | Status: DISCONTINUED | OUTPATIENT
Start: 2022-09-05 | End: 2022-09-09

## 2022-09-05 RX ORDER — CINACALCET 30 MG/1
1 TABLET, FILM COATED ORAL
Qty: 0 | Refills: 0 | DISCHARGE

## 2022-09-05 RX ADMIN — Medication 1 GRAM(S): at 17:08

## 2022-09-05 RX ADMIN — GABAPENTIN 100 MILLIGRAM(S): 400 CAPSULE ORAL at 21:05

## 2022-09-05 RX ADMIN — Medication 3 MILLILITER(S): at 04:30

## 2022-09-05 RX ADMIN — Medication 3 MILLILITER(S): at 04:40

## 2022-09-05 RX ADMIN — Medication 1 TABLET(S): at 14:50

## 2022-09-05 RX ADMIN — CEFTRIAXONE 100 MILLIGRAM(S): 500 INJECTION, POWDER, FOR SOLUTION INTRAMUSCULAR; INTRAVENOUS at 06:48

## 2022-09-05 RX ADMIN — AMLODIPINE BESYLATE 10 MILLIGRAM(S): 2.5 TABLET ORAL at 14:50

## 2022-09-05 RX ADMIN — Medication 81 MILLIGRAM(S): at 14:50

## 2022-09-05 RX ADMIN — SODIUM ZIRCONIUM CYCLOSILICATE 10 GRAM(S): 10 POWDER, FOR SUSPENSION ORAL at 06:08

## 2022-09-05 RX ADMIN — CLOPIDOGREL BISULFATE 75 MILLIGRAM(S): 75 TABLET, FILM COATED ORAL at 14:50

## 2022-09-05 RX ADMIN — Medication 1 MILLIGRAM(S): at 14:50

## 2022-09-05 RX ADMIN — APIXABAN 2.5 MILLIGRAM(S): 2.5 TABLET, FILM COATED ORAL at 17:08

## 2022-09-05 RX ADMIN — Medication 3 MILLILITER(S): at 04:20

## 2022-09-05 RX ADMIN — Medication 1 APPLICATION(S): at 17:10

## 2022-09-05 NOTE — H&P ADULT - HISTORY OF PRESENT ILLNESS
40 y/o F w/ PMH of spinal bifida, VPS early in life (last revised at the age of 10), ESRD on HD with RUE graft/stent and renal transplant in 2013 presents with acute onset SOB. Pt felt well until she awoke at 2:30am 9/4 with severe SOB which improved with sitting up. She was unable to speak and texted her mother in the next room. At 4am she was brought to the ED. Pt denies ever having had similar symptoms. Her normal HD schedule is Mon Wed Frid, last HE Frid 9/2. Pt notes that prior to the episode of SOB she was very thirsty and likely exceeded her fluid restriction. She also notes pain and swelling in b/l lower extremities. Denies recent illness, fever, chills, headache, nausea, vomiting, changes in urination/BMS. Of note, pt has limited sensation below the umbilicus and self catheterizes 3-4 times daily, each time yielding 20-30ccs of urine. She is on ASA/plavix as well as Eliquis due to her dialysis stent graft having multiple episodes of clotting off requiring intervention.    In the ED, pt received albuterol neb and O2 by NC and SOB improved. She also received 10mg PO lokelma for K elevated to 6.1 and empiric CTX for WBC 17.42. CXR showed b/l small pleural effusions. Nephro on board, will go for HD this morning.

## 2022-09-05 NOTE — H&P ADULT - NSHPPHYSICALEXAM_GEN_ALL_CORE
GENERAL: Pt is resting comfortably, NC in place. Pt is pleasant and interactive, mother at bedside.   HEAD: normocephalic, atraumatic  HEENT: normal conjunctiva, oral mucosa moist, neck supple. Some facial fullness.  CARDIAC: tachycardia to 100bpm  PULM: crackles b/l lung bases  GI: abdomen nondistended, soft, nontender, no guarding or rebound tenderness, well healed surgical scar  : no CVA tenderness, no suprapubic tenderness  NEURO: alert and oriented x 3, normal speech, PERRLA, EOMI, no focal motor or sensory deficits  MSK: no visible deformities, b/l lower extremities swelling 2+  SKIN: no visible rashes, dry, well-perfused  PSYCH: appropriate mood and affect Vital Signs Last 24 Hrs  T(C): 36.4 (05 Sep 2022 09:44), Max: 36.9 (05 Sep 2022 04:30)  T(F): 97.5 (05 Sep 2022 09:44), Max: 98.5 (05 Sep 2022 04:30)  HR: 105 (05 Sep 2022 09:44) (92 - 107)  BP: 177/101 (05 Sep 2022 09:44) (147/85 - 191/115)  BP(mean): 104 (05 Sep 2022 06:00) (104 - 105)  RR: 20 (05 Sep 2022 09:44) (18 - 26)  SpO2: 99% (05 Sep 2022 09:44) (86% - 100%)    Parameters below as of 05 Sep 2022 09:44  Patient On (Oxygen Delivery Method): nasal cannula  O2 Flow (L/min): 2        GENERAL: Pt is resting comfortably, NC in place. Pt is pleasant and interactive, mother at bedside.   HEAD: normocephalic, atraumatic  HEENT: normal conjunctiva, oral mucosa moist, neck supple. Some facial fullness.  CARDIAC: tachycardia to 100bpm  PULM: crackles b/l lung bases  GI: abdomen nondistended, soft, nontender, no guarding or rebound tenderness, well healed surgical scar  : no CVA tenderness, no suprapubic tenderness  NEURO: alert and oriented x 3, normal speech, PERRLA, EOMI, no focal motor or sensory deficits  MSK: no visible deformities, b/l lower extremities swelling 2+  SKIN: no visible rashes, dry, well-perfused  PSYCH: appropriate mood and affect

## 2022-09-05 NOTE — ED PROVIDER NOTE - ATTENDING CONTRIBUTION TO CARE
I, Nils Duff, performed a history and physical exam of the patient and discussed their management with the resident and /or advanced care provider. I reviewed the resident and /or ACP's note and agree with the documented findings and plan of care. I was present and available for all procedures.

## 2022-09-05 NOTE — H&P ADULT - PROBLEM SELECTOR PLAN 4
- VTE prophylaxis  - PT on home ASA, plavix, eliquis  - Dispo home History of spina bifida. Walks at home with walker. Has some motor function and sensory function in all extremities but weakness/sensory loss particularly in lower extremities. Straight caths at home due to neurogenic bladder.  - cont to monitor for clinical changes Long hx of anemia, likely chronic disease though MCV increased.   - c/w home Iron  - Check B12 and folate levels  - Check EPO  - Continue to trend Hgb, transfuse if <7

## 2022-09-05 NOTE — H&P ADULT - NSICDXFAMILYHX_GEN_ALL_CORE_FT
FAMILY HISTORY:  Mother  Still living? Unknown  FH: breast cancer, Age at diagnosis: Age Unknown  FH: type 2 diabetes, Age at diagnosis: Age Unknown    Grandparent  Still living? Unknown  FH: breast cancer, Age at diagnosis: Age Unknown    Aunt  Still living? Unknown  FH: breast cancer, Age at diagnosis: Age Unknown  FH: kidney disease, Age at diagnosis: Age Unknown    Uncle  Still living? Unknown  FH: kidney disease, Age at diagnosis: Age Unknown

## 2022-09-05 NOTE — H&P ADULT - NSHPREVIEWOFSYSTEMS_GEN_ALL_CORE
REVIEW OF SYSTEMS  General:   Skin/Breast:   Ophthalmologic:	  ENMT:	  Respiratory and Thorax:	  Cardiovascular:	  Gastrointestinal:	  Genitourinary:	  Musculoskeletal:	  Neurological:	  Psychiatric:	  Hematology/Lymphatics:	  Endocrine:	  Allergic/Immunologic:

## 2022-09-05 NOTE — CONSULT NOTE ADULT - SUBJECTIVE AND OBJECTIVE BOX
seen examined. hd consent in chart. hd for this am. full consult to follow  seen examined. hd consent in chart. hd for this am. full consult to follow     [facility]  [phone]  NEPHROLOGY      INITIAL CONSULT NOTE  --------------------------------------------------------------------------------  HPI:      42 y/o F w/ PMH of spinal bifida, VPS early in life (last revised at the age of 10), ESRD on HD with RUE graft/stent and renal transplant in 2013 presents with acute onset SOB. Pt felt well until she awoke at 2:30am 9/4 with severe SOB which improved with sitting up. She was unable to speak and texted her mother in the next room. At 4am she was brought to the ED. Pt denies ever having had similar symptoms. Her normal HD schedule is Mon Wed Frid, last HE Frid 9/2. Pt notes that prior to the episode of SOB she was very thirsty and likely exceeded her fluid restriction. She also notes pain and swelling in b/l lower extremities. Denies recent illness, fever, chills, headache, nausea, vomiting, changes in urination/BMS. Of note, pt has limited sensation below the umbilicus and self catheterizes 3-4 times daily, each time yielding 20-30ccs of urine. She is on ASA/plavix as well as Eliquis due to her dialysis stent graft having multiple episodes of clotting off requiring intervention.    In the ED, pt received albuterol neb and O2 by NC and SOB improved. She also received 10mg PO lokelma for K elevated to 6.1 and empiric CTX for WBC 17.42. CXR showed b/l small pleural effusions.  renal consult called. .         PAST HISTORY  --------------------------------------------------------------------------------  PAST MEDICAL & SURGICAL HISTORY:  Hydrocephalus      Spina bifida      ESRD (end stage renal disease) on dialysis      S/P  shunt      Kidney transplanted        FAMILY HISTORY:  FH: type 2 diabetes (Mother)    FH: kidney disease (Aunt, Uncle)    FH: breast cancer (Mother, Aunt, Grandparent)      PAST SOCIAL HISTORY:    ALLERGIES & MEDICATIONS  --------------------------------------------------------------------------------  Allergies    latex (Anaphylaxis; Rash)  No Known Drug Allergies    Intolerances      Standing Inpatient Medications  amLODIPine   Tablet 10 milliGRAM(s) Oral daily  apixaban 2.5 milliGRAM(s) Oral every 12 hours  aspirin enteric coated 81 milliGRAM(s) Oral daily  BACItracin   Ointment 1 Application(s) Topical daily  clopidogrel Tablet 75 milliGRAM(s) Oral daily  ferrous    sulfate 325 milliGRAM(s) Oral <User Schedule>  folic acid 1 milliGRAM(s) Oral daily  gabapentin 100 milliGRAM(s) Oral at bedtime  influenza   Vaccine 0.5 milliLiter(s) IntraMuscular once  multivitamin 1 Tablet(s) Oral daily  omega-3-Acid Ethyl Esters 1 Gram(s) Oral every 24 hours    PRN Inpatient Medications  acetaminophen     Tablet .. 1000 milliGRAM(s) Oral every 8 hours PRN  melatonin 5 milliGRAM(s) Oral at bedtime PRN      REVIEW OF SYSTEMS  --------------------------------------------------------------------------------  Gen: No  fevers/chills   Skin: No rashes  Head/Eyes/Ears/Mouth: No headache; Normal hearing;  No sinus pain/discomfort, sore throat  Respiratory:  + SOB , cough, wheezing  CV: No chest pain, orthopnea +   GI: No abdominal pain, diarrhea, nausea, vomiting, melena  : No dysuria, decrease urination or hesitancy urinating  hematuria, nocturia  MSK: No joint pain/swelling; no back pain  Neuro: No dizziness/lightheadedness  also with no edema       VITALS/PHYSICAL EXAM  --------------------------------------------------------------------------------  T(C): 37.3 (09-05-22 @ 20:02), Max: 37.3 (09-05-22 @ 20:02)  HR: 104 (09-05-22 @ 20:02) (9 - 107)  BP: 133/75 (09-05-22 @ 20:02) (133/75 - 191/115)  RR: 19 (09-05-22 @ 20:02) (18 - 26)  SpO2: 98% (09-05-22 @ 20:02) (86% - 100%)  Wt(kg): --  Height (cm): 152.4 (09-05-22 @ 04:07)  Weight (kg): 85 (09-05-22 @ 15:03)  BMI (kg/m2): 36.6 (09-05-22 @ 15:03)  BSA (m2): 1.82 (09-05-22 @ 15:03)      Physical Exam:  	Gen: Non toxic comfortable appearing  on O2   	+ jvd   	Pulm: decrease bs  bases rales or ronchi or wheezing  	CV: RRR, S1S2; no rub  	Back: No CVA tenderness; no sacral edema  	Abd: +BS, obese  soft, nontender/nondistended  	: No suprapubic tenderness  	UE: Warm, no cyanosis  no clubbing,  no edema  	LE: Warm, no cyanosis  no clubbing, no edema  	Neuro: alert and oriented. speech coherent   	Skin: Warm, no decrease skin turgor   	Vascular access:  SERGIO DAY     LABS/STUDIES  --------------------------------------------------------------------------------              10.4   17.42 >-----------<  341      [09-05-22 @ 04:52]              33.3     140  |  94  |  15  ----------------------------<  154      [09-05-22 @ 16:34]  3.7   |  27  |  4.08        Ca     9.3     [09-05-22 @ 16:34]      Mg     2.7     [09-05-22 @ 04:52]      Phos  5.5     [09-05-22 @ 08:50]    TPro  8.4  /  Alb  4.3  /  TBili  0.3  /  DBili  x   /  AST  12  /  ALT  16  /  AlkPhos  263  [09-05-22 @ 04:52]          Creatinine Trend:  SCr 4.08 [09-05 @ 16:34]  SCr 9.47 [09-05 @ 04:52]    Urinalysis - [09-05-22 @ 05:57]      Color Light Yellow / Appearance Slightly Turbid / SG 1.009 / pH 8.5      Gluc Trace / Ketone Negative  / Bili Negative / Urobili Negative       Blood Negative / Protein 100 mg/dL / Leuk Est Large / Nitrite Negative      RBC 3 /  / Hyaline 8 / Gran  / Sq Epi  / Non Sq Epi 3 / Bacteria Negative      HbA1c 5.4      [01-22-20 @ 00:04]    HBsAb 127.2      [09-05-22 @ 13:29]  HBsAb Reactive      [09-05-22 @ 13:29]  HBsAg Nonreact      [09-05-22 @ 13:29]  HBcAb Nonreact      [09-05-22 @ 13:29]  HCV 0.20, Nonreact      [09-05-22 @ 13:29]      < from: Xray Chest 1 View- PORTABLE-Urgent (Xray Chest 1 View- PORTABLE-Urgent .) (09.05.22 @ 05:43) >  ACC: [id number] EXAM:  XR CHEST PORTABLE URGENT 1V                          PROCEDURE DATE:  09/05/2022          INTERPRETATION:  EXAMINATION: XR CHEST URGENT    CLINICAL INDICATION: Shortness of breath    TECHNIQUE: Single frontal, portable view of the chest was obtained.    COMPARISON: Chest x-ray None.    FINDINGS:  Right internal jugular central line with tip in the right atrium.  The heart is poorly assessed on this projection.  Bibasilar hazy opacities  There is no pneumothorax or pleural effusion.    IMPRESSION:  Bibasilar hazy opacities likely representing atelectasis and/or small   pleural effusion.    --- End of Report ---      < end of copied text >

## 2022-09-05 NOTE — ED PROVIDER NOTE - CLINICAL SUMMARY MEDICAL DECISION MAKING FREE TEXT BOX
41-year-old female with a past medical history of ESRD on dialysis Monday Wednesday Friday presents to the ED with acute shortness of breath starting this morning. Endorses drinking large volume of liquid yesterday. Only mixed 100 ccs of urine a day. Noted to be hypoxic initial vitals started on re breather with pulse ox improved to 100% . Patient with acute respiratory distress with accessory muscle use. Bedside pocus positive for diffuse scattered b lines . IVC was difficult to visualize. 2+ bilateral lower extremity swelling noted. Concerns for possible pulmonary edema secondary to ESRD versus pneumonia versus new onset heart failure. We will order labs, imaging, EKG, meds, reassess.

## 2022-09-05 NOTE — CONSULT NOTE ADULT - ASSESSMENT
40 y/o F w/ PMH of spinal bifida, VPS early in life (last revised at the age of 10), ESRD on HD with RUE graft/stent and  failed renal transplant  presents with acute onset SOB.  In the ED, pt received albuterol neb and O2 by NC and SOB improved. She also received 10mg PO lokelma for K elevated to 6.1 and empiric CTX for WBC 17.42. CXR showed b/l small pleural effusions.     1- ESRD  2- CHF   3- HTN   4- anemia   5- hyperkalemia     hd consent obtained witnessed placed in chart  urgent hd arranged.   cont norvasc post hd   fluid removal with hd for chf   trend hb   low k renal diet   leukocytosis pan culture  and empiric ab onboard

## 2022-09-05 NOTE — H&P ADULT - PROBLEM SELECTOR PLAN 2
Pt's Pt's WBC was 17.42 on admission, no bandemia but procal elevated to 1.01  - s/p CTX in ED  - consider empiric antibx w/ MRSA coverage given hx of HD and self-catheterization  - on methenamine 1g QD for UTI prophylaxis  - f/u blood and urine cx Pt meets criteria based on WBC count, HR, possible source (UTI vs pneumonia vs gallbladder).  - WBC was 17.42 on admission, no bandemia but procal elevated to 1.01  - UA grossly positive  - s/p CTX in ED  - c/w CTX x 5 days for suspected UTI  - on methenamine 1g QD for UTI prophylaxis  - f/u blood and urine cx Pt meets criteria based on WBC count, HR, possible source (UTI vs pneumonia vs gallbladder).  - WBC was 17.42 on admission, no bandemia but procal elevated to 1.01  - UA grossly positive  - s/p CTX in ED  - c/w CTX x 5 days for suspected UTI  - on methenamine 1g QD for UTI prophylaxis. Can restart on discharge  - f/u blood and urine cx

## 2022-09-05 NOTE — ED PROVIDER NOTE - PHYSICAL EXAMINATION
GENERAL: respiratory distress, elevated BMI  HEAD: normocephalic, atraumatic  HEENT: normal conjunctiva, oral mucosa moist, neck supple  CARDIAC: tachycardia  PULM: respiratory distress  GI: abdomen nondistended, soft, nontender, no guarding or rebound tenderness, well healed surgical scar  : no CVA tenderness, no suprapubic tenderness  NEURO: alert and oriented x 3, normal speech, PERRLA, EOMI, no focal motor or sensory deficits  MSK: no visible deformities, b/l lower extremities swelling 2+  SKIN: no visible rashes, dry, well-perfused  PSYCH: appropriate mood and affect

## 2022-09-05 NOTE — ED PROVIDER NOTE - CARE PLAN
1 Principal Discharge DX:	SOB (shortness of breath)   Principal Discharge DX:	SOB (shortness of breath)  Secondary Diagnosis:	Volume overload

## 2022-09-05 NOTE — H&P ADULT - PROBLEM SELECTOR PLAN 3
Long hx of anemia, likely chronic disease  - c/w home Iron  - pt also gets iron in HD Pt likely has fluid overload after exceeding her fluid restriction 9/4 vs new onset HF. Previous transplant in 2013 but progressed to ESRD given chronic obstructive nephropathy secondary to neurogenic bladder w/ PMH of spina bifida. Straight caths at home  - CXR showed b/l pleural effusions  - EKG normal  - Nephro aware, appreciate recs  - Pt's HD schedule is mon wed frid  - Hyperkalemia, pts K was 6.1 on admission   - s/p 10mg PO lokelma in ED  - Received HD 9/5 upon admission  - Repeat labs as above, also VBG at 4pm Pt likely has fluid overload after exceeding her fluid restriction 9/4 vs new onset HF. Previous transplant in 2013 but progressed to ESRD given chronic obstructive nephropathy secondary to neurogenic bladder w/ PMH of spina bifida. Straight caths at home  - CXR showed findings consistent with pulmonary edema and   - EKG normal  - Nephro aware, f/u final recs  - Pt's HD schedule is mon wed frid  - Hyperkalemia, pts K was 6.1 on admission   - s/p 10mg PO lokelma in ED  - Received HD 9/5 upon admission  - Repeat labs as above, also VBG at 4pm

## 2022-09-05 NOTE — H&P ADULT - PROBLEM SELECTOR PLAN 1
Pt likely has fluid overload after exceeding her fluid restriction 9/4 vs new onset HF  - CXR showed b/l pleural effusions  - EKG normal  - Nephro aware, appreciate recs  - Pt's HD schedule is mon wed frid  - Received HD 9/5 upon admission  - HF work up: TTE to evaluate EF (trops normal on admission, BNP elevated in setting of fluid overload) Pt likely has fluid overload after exceeding her fluid restriction 9/4 vs new onset HF  - CXR showed b/l pleural effusions  - EKG normal  - Nephro aware, appreciate recs  - Pt's HD schedule is mon wed frid  - Hyperkalemia, pts K was 6.1 on admission   - s/p 10mg PO lokelma in ED  - Received HD 9/5 upon admission  - HF work up: TTE to evaluate EF (trops normal on admission, BNP elevated in setting of fluid overload) Pt likely has fluid overload after exceeding her fluid restriction 9/4 vs new onset HF. Previous transplant in 2013 but progressed to ESRD given chronic obstructive nephropathy secondary to neurogenic bladder w/ PMH of spina bifida. Straight caths at home  - CXR showed b/l pleural effusions  - EKG normal  - Nephro aware, appreciate recs  - Pt's HD schedule is mon wed frid  - Hyperkalemia, pts K was 6.1 on admission   - s/p 10mg PO lokelma in ED  - Received HD 9/5 upon admission  - HF work up: TTE to evaluate EF (trops normal on admission, BNP elevated in setting of fluid overload) Pt likely has fluid overload in the setting of ESRD after dietary nonadherence 9/4 vs new onset HF  - CXR showed b/l pleural effusions  - EKG normal  - Nephro aware, appreciate recs  - Pt's HD schedule is mon wed frid  - Received HD 9/5 upon admission  - HF work up: TTE to evaluate EF (trops normal on admission, BNP elevated in setting of fluid overload)  - Repeat BMP at 4pm (HE completed at 2pm) Pt likely has fluid overload in the setting of ESRD after dietary nonadherence 9/4 vs new onset HF  - CXR showed b/l pleural effusions  - EKG normal  - Nephro aware, f/u recs  - Pt's HD schedule is mon wed frid  - Received HD 9/5 upon admission  - HF work up: TTE to evaluate EF (trops normal on admission, BNP elevated in setting of fluid overload)  - Repeat BMP at 4pm (HD completed at 2pm)

## 2022-09-05 NOTE — ED PROVIDER NOTE - NSICDXPASTMEDICALHX_GEN_ALL_CORE_FT
PAST MEDICAL HISTORY:  ESRD (end stage renal disease) on dialysis     Hydrocephalus     Spina bifida

## 2022-09-05 NOTE — H&P ADULT - PROBLEM SELECTOR PLAN 5
- VTE prophylaxis: on eliquis  - PT on home ASA, plavix, eliquis  - Diet: Renal, DASH diet  - Dispo home History of spina bifida. Walks at home with walker. Has some motor function and sensory function in all extremities but weakness/sensory loss particularly in lower extremities. Straight caths at home due to neurogenic bladder.  - cont to monitor for clinical changes  - PT consult

## 2022-09-05 NOTE — H&P ADULT - NSHPSOCIALHISTORY_GEN_ALL_CORE
Pt lives at home in [place] with her mother, two sisters live nearby in [place]. She works from home for [employer]. Denies etoh, smoking tobacco and marijuana and other drugs.

## 2022-09-05 NOTE — ED ADULT NURSE REASSESSMENT NOTE - NS ED NURSE REASSESS COMMENT FT1
Received patient from RN, patient at baseline mental status, able to make needs known, NAD, VSS, patient agreeable to plan of care, pending bed assignment, comfort and safety provided.

## 2022-09-05 NOTE — H&P ADULT - ASSESSMENT
40 y/o F w/ PMH of spinal bifida, VPS early in life (last revised at the age of 10), ESRD on HD with RUE graft/stent and renal transplant in 2013 presents with acute onset SOB after exceeding her fluid restriction the day prior. CXR showed b/l pleural effusions, labs revealed hyperkalemia, leukocytosis. Pt likely has fluid overload in the setting of esrd vs new onset HF, admitted for workup.

## 2022-09-05 NOTE — PATIENT PROFILE ADULT - FUNCTIONAL ASSESSMENT - BASIC MOBILITY 6.
"The patient is an 82 y/o M with myasthenia gravis who is transferred to [facility] with sudden loss of vision in the R eye.    Patient has been experiencing dizziness and wobbliness in the last 4-5 days. Yesterday while watching TV, his vision distorted like a "kleidoscope" that resolved after a few minutes. This morning he woke up ~8:30AM with loss of vision in the R eye. He denies pain or pressure in the eye, or associated headache or tenderness on the temple. Patient has undergone surgery for glaucoma per daughter. Patient had p/w blurred vision in the same eye with unilateral facial droop (?) one year ago, work up negative for stroke but MG panel was positive. He was started on Mestinon, however took himself off of it after a while due to rise in his BP.  Patient denies smoking, however chews tobacco. Drinks alcohol rarely.       " 4 = No assist / stand by assistance

## 2022-09-05 NOTE — ED PROVIDER NOTE - OBJECTIVE STATEMENT
41-year-old female with a past medical history of ESRD on dialysis Monday Wednesday Friday, spinal bifida, hydrocephalus with  shunt, failed renal transplant presented to the ED with acute shortness of breath. Usual state of health prior to onset of symptoms. Patient started acutely feeling short of breath earlier this morning. Patient endorses drinking large amounts of water yesterday. Last dialysis session was 4 on September 2nd 2022 she denies any chest pain, abdominal pain, fevers. No sick contacts at home. Her shortness of breath is exacerbating with lying flat. No alleviating factors. She denies any other symptoms at bedside.

## 2022-09-05 NOTE — ED ADULT NURSE NOTE - OBJECTIVE STATEMENT
Pt is 41y F c/o SOB. Pt is A&Ox3, neuro status intact, breathing labored. Pt presents tachypneic, tachycardiac, placed on NRB @ 10L O2. Pt PMH ESRD on diaylsis MWF, spinal bifida,  shunt. Pt denies fever, chills, cough, dizziness, ha, cp, abd pain, NVD. Pt resting in stretcher, bed in lowest position, educated on call bell, family member at bedside, aware of plan of care. Comfort and safety measures maintained. Pt is 41y F c/o SOB that started earlier this morning. Pt is A&Ox3, neuro status intact, breathing labored. Pt presents tachypneic, tachycardiac, placed on NRB @ 10L O2, started on nebulizer treatment, IV access obtained. Pt PMH ESRD on diaylsis MWF, spinal bifida,  shunt. Pt denies fever, chills, cough, dizziness, ha, cp, abd pain, NVD. Pt resting in stretcher, bed in lowest position, educated on call bell, family member at bedside, aware of plan of care. Comfort and safety measures maintained.

## 2022-09-05 NOTE — H&P ADULT - ATTENDING COMMENTS
Patient is a 40 yo F with PMHx of spinal bifida,  shunt early in life (last revised at the age of 10), ESRD (s/p renal transplant in 2013 with subsequent rejection ~8 years later, currently on HD via RUE AVF on M/W/F), who presents with acute onset SOB 2/2 pulm edema and found to be septic 2/2 UTI. Patient reports that she recently travelled to [place] during which time she was nonadherent to her fluid and sodium restriction. She awoke from sleep at 2:30 am with acute onset shortness of breath which was mildly improved by sitting upright. No worsening factors. Patient was then brought to the ED and found to have radiographic evidence of pulmonary edema. Labs also revealed WBC of 17.4,  K of 6.3, BNP of 5300 and Procal of 1. UA grossly positive. Physical exam notable for rales in the right lung base and TTP in the RUQ of the abdomen. Patient reports that she regularly straight caths herself due to decreased sensorium below the waist which is her baseline. Taken for urgent HD with subsequent improvement in her dyspnea. Suspect dyspnea is 2/2 pulmonary edema which is in the setting of diet nonadherence. Will obtain TTE to r/o cardiac etiology for her dyspnea. Suspect leukocytosis is in the setting of UTI given grossly positive UA. Will treat for UTI with Ceftriaxone. Obtain RUQ u/s given elevated alk phos and RUQ TTP. F/u renal recs. Check B12 and folate levels given macrocytic anemia. Remainder of plan as per note above.

## 2022-09-05 NOTE — H&P ADULT - NSHPLABSRESULTS_GEN_ALL_CORE
10.4   17.42 )-----------( 341      ( 05 Sep 2022 04:52 )             33.3     09-05    140  |  99  |  48<H>  ----------------------------<  129<H>  6.1<H>   |  24  |  9.47<H>    Ca    8.9      05 Sep 2022 04:52  Phos  5.5     09-05  Mg     2.7     09-05    TPro  8.4<H>  /  Alb  4.3  /  TBili  0.3  /  DBili  x   /  AST  12  /  ALT  16  /  AlkPhos  263<H>  09-05    ACC: [id number] EXAM:  XR CHEST PORTABLE URGENT 1V                          PROCEDURE DATE:  09/05/2022          INTERPRETATION:  EXAMINATION: XR CHEST URGENT    CLINICAL INDICATION: Shortness of breath    TECHNIQUE: Single frontal, portable view of the chest was obtained.    COMPARISON: Chest x-ray None.    FINDINGS:  Right internal jugular central line with tip in the right atrium.  The heart is poorly assessed on this projection.  Bibasilar hazy opacities  There is no pneumothorax or pleural effusion.    IMPRESSION:  Bibasilar hazy opacities likely representing atelectasis and/or small   pleural effusion. 10.4   17.42 )-----------( 341      ( 05 Sep 2022 04:52 )             33.3     09-05    140  |  99  |  48<H>  ----------------------------<  129<H>  6.1<H>   |  24  |  9.47<H>    Ca    8.9      05 Sep 2022 04:52  Phos  5.5     09-05  Mg     2.7     09-05    TPro  8.4<H>  /  Alb  4.3  /  TBili  0.3  /  DBili  x   /  AST  12  /  ALT  16  /  AlkPhos  263<H>  09-05    ACC: [id number] EXAM:  XR CHEST PORTABLE URGENT 1V                          PROCEDURE DATE:  09/05/2022          INTERPRETATION:  EXAMINATION: XR CHEST URGENT    CLINICAL INDICATION: Shortness of breath    TECHNIQUE: Single frontal, portable view of the chest was obtained.    COMPARISON: Chest x-ray None.    FINDINGS:  Right internal jugular central line with tip in the right atrium.  The heart is poorly assessed on this projection.  Bibasilar hazy opacities  There is no pneumothorax or pleural effusion.    IMPRESSION:  Bibasilar hazy opacities likely representing atelectasis and/or small   pleural effusion.          Personally reviewed available labs, imaging and ekg [1]        Imaging:  [ 2] I independently reviewed CXR and increased pulmonary vascular congestion with trace b/l pleural effusions consistent with pulmonary edema is noted     EKG:  [2] I independently reviewed EKG/cardiac tracing and NSR appreciated with no hyperK changes     Review of old records:  [2] I personally reviewed previous records

## 2022-09-05 NOTE — H&P ADULT - PROBLEM SELECTOR PLAN 6
- VTE prophylaxis: on eliquis  - PT on home ASA, plavix, eliquis  - Diet: Renal, DASH diet  - Dispo pending clinical improvement

## 2022-09-05 NOTE — ED ADULT NURSE NOTE - NSIMPLEMENTINTERV_GEN_ALL_ED
Implemented All Universal Safety Interventions:  [place] to call system. Call bell, personal items and telephone within reach. Instruct patient to call for assistance. Room bathroom lighting operational. Non-slip footwear when patient is off stretcher. Physically safe environment: no spills, clutter or unnecessary equipment. Stretcher in lowest position, wheels locked, appropriate side rails in place. unknown

## 2022-09-06 DIAGNOSIS — R74.8 ABNORMAL LEVELS OF OTHER SERUM ENZYMES: ICD-10-CM

## 2022-09-06 DIAGNOSIS — S91.302A UNSPECIFIED OPEN WOUND, LEFT FOOT, INITIAL ENCOUNTER: ICD-10-CM

## 2022-09-06 DIAGNOSIS — A41.9 SEPSIS, UNSPECIFIED ORGANISM: ICD-10-CM

## 2022-09-06 LAB
ALBUMIN SERPL ELPH-MCNC: 4 G/DL — SIGNIFICANT CHANGE UP (ref 3.3–5)
ALP SERPL-CCNC: 208 U/L — HIGH (ref 40–120)
ALT FLD-CCNC: 13 U/L — SIGNIFICANT CHANGE UP (ref 10–45)
ANION GAP SERPL CALC-SCNC: 13 MMOL/L — SIGNIFICANT CHANGE UP (ref 5–17)
AST SERPL-CCNC: 9 U/L — LOW (ref 10–40)
BASE EXCESS BLDV CALC-SCNC: 7.5 MMOL/L — HIGH (ref -2–3)
BILIRUB SERPL-MCNC: 0.3 MG/DL — SIGNIFICANT CHANGE UP (ref 0.2–1.2)
BUN SERPL-MCNC: 25 MG/DL — HIGH (ref 7–23)
CA-I SERPL-SCNC: 1.09 MMOL/L — LOW (ref 1.15–1.33)
CALCIUM SERPL-MCNC: 9 MG/DL — SIGNIFICANT CHANGE UP (ref 8.4–10.5)
CHLORIDE BLDV-SCNC: 99 MMOL/L — SIGNIFICANT CHANGE UP (ref 96–108)
CHLORIDE SERPL-SCNC: 97 MMOL/L — SIGNIFICANT CHANGE UP (ref 96–108)
CO2 BLDV-SCNC: 33 MMOL/L — HIGH (ref 22–26)
CO2 SERPL-SCNC: 30 MMOL/L — SIGNIFICANT CHANGE UP (ref 22–31)
CREAT SERPL-MCNC: 6.2 MG/DL — HIGH (ref 0.5–1.3)
CULTURE RESULTS: SIGNIFICANT CHANGE UP
EGFR: 8 ML/MIN/1.73M2 — LOW
FERRITIN SERPL-MCNC: 1298 NG/ML — HIGH (ref 15–150)
FOLATE SERPL-MCNC: >20 NG/ML — SIGNIFICANT CHANGE UP
GAS PNL BLDV: 138 MMOL/L — SIGNIFICANT CHANGE UP (ref 136–145)
GAS PNL BLDV: SIGNIFICANT CHANGE UP
GAS PNL BLDV: SIGNIFICANT CHANGE UP
GLUCOSE BLDV-MCNC: 86 MG/DL — SIGNIFICANT CHANGE UP (ref 70–99)
GLUCOSE SERPL-MCNC: 90 MG/DL — SIGNIFICANT CHANGE UP (ref 70–99)
HCO3 BLDV-SCNC: 32 MMOL/L — HIGH (ref 22–29)
HCT VFR BLD CALC: 28.7 % — LOW (ref 34.5–45)
HCT VFR BLDA CALC: 29 % — LOW (ref 34.5–46.5)
HGB BLD CALC-MCNC: 9.5 G/DL — LOW (ref 11.7–16.1)
HGB BLD-MCNC: 9 G/DL — LOW (ref 11.5–15.5)
IRON SATN MFR SERPL: 29 % — SIGNIFICANT CHANGE UP (ref 14–50)
IRON SATN MFR SERPL: 45 UG/DL — SIGNIFICANT CHANGE UP (ref 30–160)
LACTATE BLDV-MCNC: 0.7 MMOL/L — SIGNIFICANT CHANGE UP (ref 0.5–2)
MAGNESIUM SERPL-MCNC: 2.4 MG/DL — SIGNIFICANT CHANGE UP (ref 1.6–2.6)
MCHC RBC-ENTMCNC: 31.4 GM/DL — LOW (ref 32–36)
MCHC RBC-ENTMCNC: 32 PG — SIGNIFICANT CHANGE UP (ref 27–34)
MCV RBC AUTO: 102.1 FL — HIGH (ref 80–100)
MRSA PCR RESULT.: SIGNIFICANT CHANGE UP
NRBC # BLD: 0 /100 WBCS — SIGNIFICANT CHANGE UP (ref 0–0)
PCO2 BLDV: 44 MMHG — HIGH (ref 39–42)
PH BLDV: 7.47 — HIGH (ref 7.32–7.43)
PHOSPHATE SERPL-MCNC: 6.6 MG/DL — HIGH (ref 2.5–4.5)
PLATELET # BLD AUTO: 290 K/UL — SIGNIFICANT CHANGE UP (ref 150–400)
PO2 BLDV: 185 MMHG — HIGH (ref 25–45)
POTASSIUM BLDV-SCNC: 3.8 MMOL/L — SIGNIFICANT CHANGE UP (ref 3.5–5.1)
POTASSIUM SERPL-MCNC: 3.8 MMOL/L — SIGNIFICANT CHANGE UP (ref 3.5–5.3)
POTASSIUM SERPL-SCNC: 3.8 MMOL/L — SIGNIFICANT CHANGE UP (ref 3.5–5.3)
PROT SERPL-MCNC: 7.2 G/DL — SIGNIFICANT CHANGE UP (ref 6–8.3)
RBC # BLD: 2.81 M/UL — LOW (ref 3.8–5.2)
RBC # FLD: 13.6 % — SIGNIFICANT CHANGE UP (ref 10.3–14.5)
S AUREUS DNA NOSE QL NAA+PROBE: SIGNIFICANT CHANGE UP
SAO2 % BLDV: 98.7 % — HIGH (ref 67–88)
SODIUM SERPL-SCNC: 140 MMOL/L — SIGNIFICANT CHANGE UP (ref 135–145)
SPECIMEN SOURCE: SIGNIFICANT CHANGE UP
TIBC SERPL-MCNC: 154 UG/DL — LOW (ref 220–430)
TRANSFERRIN SERPL-MCNC: 116 MG/DL — LOW (ref 200–360)
UIBC SERPL-MCNC: 110 UG/DL — SIGNIFICANT CHANGE UP (ref 110–370)
VIT B12 SERPL-MCNC: 503 PG/ML — SIGNIFICANT CHANGE UP (ref 232–1245)
WBC # BLD: 7.73 K/UL — SIGNIFICANT CHANGE UP (ref 3.8–10.5)
WBC # FLD AUTO: 7.73 K/UL — SIGNIFICANT CHANGE UP (ref 3.8–10.5)

## 2022-09-06 PROCEDURE — 76705 ECHO EXAM OF ABDOMEN: CPT | Mod: 26

## 2022-09-06 PROCEDURE — 99233 SBSQ HOSP IP/OBS HIGH 50: CPT

## 2022-09-06 PROCEDURE — 93306 TTE W/DOPPLER COMPLETE: CPT | Mod: 26

## 2022-09-06 RX ORDER — ASCORBIC ACID 60 MG
500 TABLET,CHEWABLE ORAL DAILY
Refills: 0 | Status: DISCONTINUED | OUTPATIENT
Start: 2022-09-06 | End: 2022-09-09

## 2022-09-06 RX ORDER — SEVELAMER CARBONATE 2400 MG/1
800 POWDER, FOR SUSPENSION ORAL
Refills: 0 | Status: DISCONTINUED | OUTPATIENT
Start: 2022-09-06 | End: 2022-09-09

## 2022-09-06 RX ORDER — CHLORHEXIDINE GLUCONATE 213 G/1000ML
1 SOLUTION TOPICAL
Refills: 0 | Status: DISCONTINUED | OUTPATIENT
Start: 2022-09-06 | End: 2022-09-09

## 2022-09-06 RX ORDER — CINACALCET 30 MG/1
30 TABLET, FILM COATED ORAL DAILY
Refills: 0 | Status: DISCONTINUED | OUTPATIENT
Start: 2022-09-06 | End: 2022-09-09

## 2022-09-06 RX ORDER — ACETAMINOPHEN 500 MG
650 TABLET ORAL EVERY 6 HOURS
Refills: 0 | Status: DISCONTINUED | OUTPATIENT
Start: 2022-09-06 | End: 2022-09-09

## 2022-09-06 RX ADMIN — Medication 81 MILLIGRAM(S): at 10:09

## 2022-09-06 RX ADMIN — Medication 1 APPLICATION(S): at 10:11

## 2022-09-06 RX ADMIN — Medication 500 MILLIGRAM(S): at 17:12

## 2022-09-06 RX ADMIN — Medication 325 MILLIGRAM(S): at 05:56

## 2022-09-06 RX ADMIN — CINACALCET 30 MILLIGRAM(S): 30 TABLET, FILM COATED ORAL at 17:12

## 2022-09-06 RX ADMIN — APIXABAN 2.5 MILLIGRAM(S): 2.5 TABLET, FILM COATED ORAL at 17:11

## 2022-09-06 RX ADMIN — CLOPIDOGREL BISULFATE 75 MILLIGRAM(S): 75 TABLET, FILM COATED ORAL at 10:11

## 2022-09-06 RX ADMIN — Medication 1 GRAM(S): at 17:12

## 2022-09-06 RX ADMIN — Medication 1 TABLET(S): at 10:11

## 2022-09-06 RX ADMIN — SEVELAMER CARBONATE 800 MILLIGRAM(S): 2400 POWDER, FOR SUSPENSION ORAL at 17:12

## 2022-09-06 RX ADMIN — APIXABAN 2.5 MILLIGRAM(S): 2.5 TABLET, FILM COATED ORAL at 05:56

## 2022-09-06 RX ADMIN — Medication 1 MILLIGRAM(S): at 10:15

## 2022-09-06 RX ADMIN — CEFTRIAXONE 100 MILLIGRAM(S): 500 INJECTION, POWDER, FOR SOLUTION INTRAMUSCULAR; INTRAVENOUS at 05:56

## 2022-09-06 RX ADMIN — GABAPENTIN 100 MILLIGRAM(S): 400 CAPSULE ORAL at 21:27

## 2022-09-06 RX ADMIN — AMLODIPINE BESYLATE 10 MILLIGRAM(S): 2.5 TABLET ORAL at 05:56

## 2022-09-06 NOTE — PROGRESS NOTE ADULT - PROBLEM SELECTOR PLAN 4
Long hx of anemia, likely chronic disease though MCV increased.   - c/w home Iron  - Check B12 and folate levels  - Check EPO  - Continue to trend Hgb, transfuse if <7 Pt likely has fluid overload after exceeding her fluid restriction 9/4 vs new onset HF. Previous transplant in 2013 but progressed to ESRD given chronic obstructive nephropathy secondary to neurogenic bladder w/ PMH of spina bifida. Straight caths at home  - CXR showed findings consistent with pulmonary edema and   - EKG normal  - Nephro aware, f/u final recs  - Per nephro, c/w home revela and cinacalcet  - Pt's HD schedule is mon wed frid  - Hyperkalemia, pts K was 6.1 on admission   - s/p 10mg PO lokelma in ED  - Received HD 9/5 upon admission  - Repeat labs as above, also VBG at 4pm

## 2022-09-06 NOTE — PROGRESS NOTE ADULT - SUBJECTIVE AND OBJECTIVE BOX
[facility]   [phone]  RENAL FOLLOW UP NOTE  --------------------------------------------------------------------------------  Chief Complaint:    24 hour events/subjective:    patient seen and examined  sob improving     PAST HISTORY  --------------------------------------------------------------------------------  No significant changes to PMH, PSH, FHx, SHx, unless otherwise noted    ALLERGIES & MEDICATIONS  --------------------------------------------------------------------------------  Allergies    latex (Anaphylaxis; Rash)  No Known Drug Allergies    Intolerances      Standing Inpatient Medications  amLODIPine   Tablet 10 milliGRAM(s) Oral daily  apixaban 2.5 milliGRAM(s) Oral every 12 hours  ascorbic acid 500 milliGRAM(s) Oral daily  aspirin enteric coated 81 milliGRAM(s) Oral daily  BACItracin   Ointment 1 Application(s) Topical daily  cefTRIAXone   IVPB 1000 milliGRAM(s) IV Intermittent every 24 hours  chlorhexidine 2% Cloths 1 Application(s) Topical <User Schedule>  cinacalcet 30 milliGRAM(s) Oral daily  clopidogrel Tablet 75 milliGRAM(s) Oral daily  ferrous    sulfate 325 milliGRAM(s) Oral <User Schedule>  folic acid 1 milliGRAM(s) Oral daily  gabapentin 100 milliGRAM(s) Oral at bedtime  influenza   Vaccine 0.5 milliLiter(s) IntraMuscular once  multivitamin 1 Tablet(s) Oral daily  omega-3-Acid Ethyl Esters 1 Gram(s) Oral every 24 hours  sevelamer carbonate 800 milliGRAM(s) Oral three times a day with meals    PRN Inpatient Medications  acetaminophen     Tablet .. 650 milliGRAM(s) Oral every 6 hours PRN  melatonin 5 milliGRAM(s) Oral at bedtime PRN      REVIEW OF SYSTEMS  --------------------------------------------------------------------------------    Gen: denies fevers/chills,  CVS: denies chest pain/palpitations  Resp: denies worsening SOB/Cough  GI: Denies N/V/Abd pain  : Denies dysuria    VITALS/PHYSICAL EXAM  --------------------------------------------------------------------------------  T(C): 37 (09-06-22 @ 11:25), Max: 37.3 (09-05-22 @ 20:02)  HR: 98 (09-06-22 @ 11:25) (9 - 105)  BP: 153/80 (09-06-22 @ 11:25) (133/75 - 177/94)  RR: 18 (09-06-22 @ 11:25) (18 - 20)  SpO2: 94% (09-06-22 @ 11:25) (94% - 99%)  Wt(kg): --  Height (cm): 152.4 (09-05-22 @ 04:07)  Weight (kg): 85 (09-05-22 @ 15:03)  BMI (kg/m2): 36.6 (09-05-22 @ 15:03)  BSA (m2): 1.82 (09-05-22 @ 15:03)      Physical Exam:  	  	Gen: Appears comfortable, on RA  	Pulm: decrease bs  bases rales or ronchi or wheezing  	CV: Mild JVD. RRR, S1S2;   	Abd: +BS, obese  soft, nontender/nondistended  	: No suprapubic tenderness  	UE: Warm, no cyanosis  no clubbing,  no edema  	LE: Warm, no cyanosis  no clubbing, no edema  	Vascular access:  RUE AVG     LABS/STUDIES  --------------------------------------------------------------------------------              9.0    7.73  >-----------<  290      [09-06-22 @ 05:20]              28.7     140  |  97  |  25  ----------------------------<  90      [09-06-22 @ 05:20]  3.8   |  30  |  6.20        Ca     9.0     [09-06-22 @ 05:20]      Mg     2.4     [09-06-22 @ 05:20]      Phos  6.6     [09-06-22 @ 05:20]    TPro  7.2  /  Alb  4.0  /  TBili  0.3  /  DBili  x   /  AST  9   /  ALT  13  /  AlkPhos  208  [09-06-22 @ 05:20]          Creatinine Trend:  SCr 6.20 [09-06 @ 05:20]  SCr 4.08 [09-05 @ 16:34]  SCr 9.47 [09-05 @ 04:52]              Urinalysis - [09-05-22 @ 05:57]      Color Light Yellow / Appearance Slightly Turbid / SG 1.009 / pH 8.5      Gluc Trace / Ketone Negative  / Bili Negative / Urobili Negative       Blood Negative / Protein 100 mg/dL / Leuk Est Large / Nitrite Negative      RBC 3 /  / Hyaline 8 / Gran  / Sq Epi  / Non Sq Epi 3 / Bacteria Negative      Iron 45, TIBC 154, %sat 29      [09-06-22 @ 05:20]  Ferritin 1298      [09-06-22 @ 05:20]  HbA1c 5.4      [01-22-20 @ 00:04]

## 2022-09-06 NOTE — PROGRESS NOTE ADULT - SUBJECTIVE AND OBJECTIVE BOX
NASRA PELAYO  41y  MRN: [id number]    Subjective: Pt reports feeling back to her baseline. She has been eating and drinking normally and slept well. Able to ambulate to the bathroom. Denies fevers, chills, nausea, vomiting, diarrhea.     Patient is a 41y old  Female who presents with a chief complaint of SOB (06 Sep 2022 13:04)      Interval history/overnight events: NAOE      MEDICATIONS  (STANDING):  amLODIPine   Tablet 10 milliGRAM(s) Oral daily  apixaban 2.5 milliGRAM(s) Oral every 12 hours  ascorbic acid 500 milliGRAM(s) Oral daily  aspirin enteric coated 81 milliGRAM(s) Oral daily  BACItracin   Ointment 1 Application(s) Topical daily  cefTRIAXone   IVPB 1000 milliGRAM(s) IV Intermittent every 24 hours  chlorhexidine 2% Cloths 1 Application(s) Topical <User Schedule>  cinacalcet 30 milliGRAM(s) Oral daily  clopidogrel Tablet 75 milliGRAM(s) Oral daily  ferrous    sulfate 325 milliGRAM(s) Oral <User Schedule>  folic acid 1 milliGRAM(s) Oral daily  gabapentin 100 milliGRAM(s) Oral at bedtime  influenza   Vaccine 0.5 milliLiter(s) IntraMuscular once  multivitamin 1 Tablet(s) Oral daily  omega-3-Acid Ethyl Esters 1 Gram(s) Oral every 24 hours  sevelamer carbonate 800 milliGRAM(s) Oral three times a day with meals    MEDICATIONS  (PRN):  acetaminophen     Tablet .. 650 milliGRAM(s) Oral every 6 hours PRN Temp greater or equal to 38C (100.4F), Mild Pain (1 - 3), Moderate Pain (4 - 6)  melatonin 5 milliGRAM(s) Oral at bedtime PRN Insomnia        Objective:    Vitals: Vital Signs Last 24 Hrs  T(C): 37 (22 @ 11:), Max: 37.3 (22 @ 20:02)  T(F): 98.6 (22 @ 11:25), Max: 99.2 (22 @ 20:02)  HR: 98 (22 @ 11:25) (9 - 105)  BP: 153/80 (22 @ 11:25) (133/75 - 177/94)  BP(mean): --  RR: 18 (22 @ 11:25) (18 - 20)  SpO2: 94% (22 @ 11:25) (94% - 99%)            I&O's Summary    06 Sep 2022 07:01  -  06 Sep 2022 14:29  --------------------------------------------------------  IN: 480 mL / OUT: 0 mL / NET: 480 mL        PHYSICAL EXAM:  GENERAL: NAD  HEENT: PERRL, no scleral icterus, no head and neck lad   CHEST/LUNG: CTAB, no wheezing, crackles, or ronchi   HEART: RRR, normal S1, S2, no murmurs, gallops, or rubs appreciated   ABDOMEN: soft, nondistended, non-tender, normoactive, no HSM, no rebound, no guarding, no rigidity  SKIN: No rashes or lesions  NERVOUS SYSTEM: Alert & Oriented X3  EXT: no peripheral edema  PSYCH: calm and cooperative     LABS:        140  |  97  |  25<H>  ----------------------------<  90  3.8   |  30  |  6.20<H>      140  |  94<L>  |  15  ----------------------------<  154<H>  3.7   |  27  |  4.08<H>      140  |  99  |  48<H>  ----------------------------<  129<H>  6.1<H>   |  24  |  9.47<H>    Ca    9.0      06 Sep 2022 05:20  Ca    9.3      05 Sep 2022 16:34  Ca    8.9      05 Sep 2022 04:52  Phos  6.6       Mg     2.4         TPro  7.2  /  Alb  4.0  /  TBili  0.3  /  DBili  x   /  AST  9<L>  /  ALT  13  /  AlkPhos  208<H>    TPro  8.4<H>  /  Alb  4.3  /  TBili  0.3  /  DBili  x   /  AST  12  /  ALT  16  /  AlkPhos  263<H>                    Urinalysis Basic - ( 05 Sep 2022 05:57 )    Color: Light Yellow / Appearance: Slightly Turbid / S.009 / pH: x  Gluc: x / Ketone: Negative  / Bili: Negative / Urobili: Negative   Blood: x / Protein: 100 mg/dL / Nitrite: Negative   Leuk Esterase: Large / RBC: 3 /hpf /  /HPF   Sq Epi: x / Non Sq Epi: 3 /hpf / Bacteria: Negative                              9.0    7.73  )-----------( 290      ( 06 Sep 2022 05:20 )             28.7                         10.4   17.42 )-----------( 341      ( 05 Sep 2022 04:52 )             33.3     CAPILLARY BLOOD GLUCOSE              RADIOLOGY & ADDITIONAL TESTS:      Patient name: NASRA PELAYO  YOB: 1980   Age: 41 (F)   MR#: [id number]  Study Date: 2022  [facility]  Study quality: Technically fair  Referring Physician: Rusty Camara MD  Blood Pressure: 151/87 mmHg  Height: 152 cm  Weight: 77 kg  BSA: 1.7 m2  ------------------------------------------------------------------------  PROCEDURE: Transthoracic echocardiogram with 2-D, M-Mode  and complete spectral and color flow Doppler.  INDICATION: Heart failure, unspecified (I50.9)  ------------------------------------------------------------------------  Dimensions:    Normal Values:  LA:     3.8    2.0 - 4.0 cm  Ao:     2.7    2.0 - 3.8 cm  SEPTUM: 1.3    0.6 - 1.2 cm  PWT:    1.1    0.6 - 1.1 cm  LVIDd:  4.3    3.0 - 5.6 cm  LVIDs:  2.6    1.8 - 4.0 cm  Derived variables:  LVMI: 106 g/m2  RWT: 0.51  Fractional short: 40 %  EF (Dejesus Rule): 64 %Doppler Peak Velocity (m/sec):  AoV=1.8  ------------------------------------------------------------------------  Observations:  Mitral Valve: Normal mitral valve. Minimal mitral  regurgitation.  Aortic Valve/Aorta: Calcified trileaflet aortic valve with  normal opening. Peak transaortic valve gradient equals 13  mm Hg, mean transaortic valve gradient equals 1 mm Hg,  estimated aortic valve area equals 2 sqcm (by continuity  equation), aortic valve velocity time integral equals 31  cm.  Peak left ventricular outflow tract gradient equals 6  mm Hg, mean gradient is equal to 1 mm Hg, LVOT velocity  time integral equals 22 cm.  Aortic Root: 2.7 cm.  Ascending Aorta: 2.8 cm.  LVOT diameter: 1.9 cm.  Left Atrium: Normal left atrium.  LA volume index = 28  cc/m2.  Left Ventricle: Normal left ventricular systolic function.  No segmental wall motion abnormalities. Mild concentric  left ventricular hypertrophy. Normal diastolic function  Right Heart: Normal right atrium. Normal right ventricular  size and function. Normal tricuspid valve. Mild tricuspid  regurgitation. Normal pulmonic valve.  Pericardium/Pleura: Trace pericardial effusion.  ------------------------------------------------------------------------  Conclusions:  1. Mild concentric left ventricular hypertrophy.  2. Normal left ventricular systolic function. No segmental  wall motion abnormalities.  3. Normal right ventricular size and function.  4. Trace pericardial effusion.  ------------------------------------------------------------------------  Confirmed on  2022 - 14:12:52 by PATRICIA Soni  ------------------------------------------------------------------------

## 2022-09-06 NOTE — PROGRESS NOTE ADULT - PROBLEM SELECTOR PLAN 7
- VTE prophylaxis: on eliquis  - PT on home ASA, plavix, eliquis  - Diet: Renal, DASH diet  - Dispo pending clinical improvement Chronic wound of L heel  - apply bacitracin to wound daily per routine  - wound consult placed

## 2022-09-06 NOTE — PROGRESS NOTE ADULT - PROBLEM SELECTOR PLAN 6
Chronic wound of L heel  - apply bacitracin to wound daily per routine  - wound consult placed History of spina bifida. Walks at home with walker. Has some motor function and sensory function in all extremities but weakness/sensory loss particularly in lower extremities. Straight caths at home due to neurogenic bladder.  - cont to monitor for clinical changes  - PT consult

## 2022-09-06 NOTE — PHYSICAL THERAPY INITIAL EVALUATION ADULT - ADDITIONAL COMMENTS
Pt lives in apartment with mother, +elevator, +ramp access no steps to enter. Pt performed ADL/IADLs independently. Ambulates with no assistive device. Owns DME: shower chair, motorized wheelchair, self propelled wheelchair.

## 2022-09-06 NOTE — PHYSICAL THERAPY INITIAL EVALUATION ADULT - REHAB POTENTIAL, PT EVAL
NEPHROLOGY PROGRESS NOTE    Patient: Fernanda Bedolla               Sex: female          DOA: 4/23/2021  1:44 PM   YOB: 1948        Age:  74 y o         LOS:  LOS: 2 days   4/25/2021    REASON FOR THE CONSULTATION:      Acute kidney injury on chronic kidney disease stage 3    SUBJECTIVE     Patient lying in bed and fast asleep  Patient was awake the whole night went to sleep early this morning  CURRENT MEDICATIONS       Current Facility-Administered Medications:     ALPRAZolam (XANAX) tablet 0 5 mg, 0 5 mg, Oral, Once, MEREDITH Gregory, Stopped at 04/25/21 0430    cefTRIAXone (ROCEPHIN) 1,000 mg in dextrose 5 % 50 mL IVPB, 1,000 mg, Intravenous, Q24H, Adolfo Hidalgo MD, Last Rate: 100 mL/hr at 04/24/21 2111, 1,000 mg at 04/24/21 2111    magnesium oxide (MAG-OX) tablet 400 mg, 400 mg, Oral, BID, Adolfo Hidalgo MD, 400 mg at 04/24/21 1838    mirtazapine (REMERON) tablet 15 mg, 15 mg, Oral, HS, Adolfo Hidalgo MD, 15 mg at 04/24/21 2111    nicotine (NICODERM CQ) 7 mg/24hr TD 24 hr patch 1 patch, 1 patch, Transdermal, Daily, Adolfo Hidalgo MD, 1 patch at 04/24/21 0918    pantoprazole (PROTONIX) EC tablet 40 mg, 40 mg, Oral, Early Morning, Adolfo Hidalgo MD, 40 mg at 04/24/21 0149    rifaximin (XIFAXAN) tablet 550 mg, 550 mg, Oral, Q12H [address], Adolfo Hidalgo MD, 550 mg at 04/24/21 2111    sodium chloride (OCEAN) 0 65 % nasal spray 1 spray, 1 spray, Each Nare, Q1H PRN, Adolfo Hidalgo MD    thiamine tablet 100 mg, 100 mg, Oral, Daily, Adolfo Hidalgo MD, 100 mg at 04/24/21 0918    REVIEW OF SYSTEMS     Review of Systems   Unable to perform ROS: Other       OBJECTIVE     Current Weight: Weight - Scale: 66 2 kg (145 lb 15 1 oz)  Vitals:    04/25/21 0700   BP: 108/67   Pulse: (!) 113   Resp: (!) 28   Temp: 97 9 °F (36 6 °C)   SpO2: 96%     Body mass index is 26 69 kg/m²      Intake/Output Summary (Last 24 hours) at 4/25/2021 0955  Last data filed at 4/25/2021 0900  Gross per 24 hour   Intake 100 ml   Output --   Net 100 ml       PHYSICAL EXAMINATION     Physical Exam  Constitutional:       Appearance: She is well-developed  Comments: Patient is asleep   HENT:      Head: Normocephalic and atraumatic  Eyes:      Pupils: Pupils are equal, round, and reactive to light  Neck:      Musculoskeletal: Neck supple  Cardiovascular:      Rate and Rhythm: Rhythm irregular  Heart sounds: Murmur present  Pulmonary:      Comments: Poor inspiratory effort; clear to auscultation anteriorly  Abdominal:      General: Bowel sounds are normal  There is distension  Palpations: Abdomen is soft  Musculoskeletal: Normal range of motion  Skin:     General: Skin is warm  Neurological:      Comments: Patient is asleep           LAB RESULTS     Results from last 7 days   Lab Units 04/25/21  0712 04/24/21  0537 04/23/21  1425   WBC Thousand/uL 3 60* 3 68* 3 15*   HEMOGLOBIN g/dL 10 1* 9 8* 6 5*   HEMATOCRIT % 31 6* 31 0* 21 4*   PLATELETS Thousands/uL 33* 34* 42*   POTASSIUM mmol/L 4 6 5 1 6 8*   CHLORIDE mmol/L 102 103 101   CO2 mmol/L 20* 19* 20*   BUN mg/dL 76* 73* 73*   CREATININE mg/dL 7 14* 7 75* 8 04*   EGFR ml/min/1 73sq m 6 5 5   CALCIUM mg/dL 8 3 8 1* 7 9*   MAGNESIUM mg/dL  --  2 1  --            RADIOLOGY RESULTS      Results for orders placed during the hospital encounter of 04/23/21   XR chest portable    Narrative CHEST     INDICATION:   confusion, rule out pneumonia  COMPARISON:  4/1/2021    EXAM PERFORMED/VIEWS:  XR CHEST PORTABLE      FINDINGS:    Heart shadow appears unremarkable  Atherosclerotic vascular calcifications are noted  There is increased opacification in the right hemithorax, likely worsening effusion with atelectasis  No pneumothorax  Osseous structures appear within normal limits for patient age  Impression Increased opacification within the right hemithorax, likely worsening effusion with atelectasis    Underlying pneumonia cannot be excluded  Workstation performed: [id number]           ASSESSMENT/PLAN     68-year-old female with past medical history of CHF with diastolic dysfunction, chronic kidney disease stage 3, liver cirrhosis, multiple myeloma refractory to medication, dementia who presents to our facility with lethargy and confusion and noted to have severe acute kidney injury  1  Acute kidney injury on chronic kidney disease stage 3:  Baseline serum creatinine 1 4   -  present with serum creatinine of 8 0 and worse   -etiology was thought to be secondary to hypotension plus profuse diarrhea that occurred at home resulting in ischemic ATN  -serum creatinine has improved to 7 1 today however urine output is not adequately documented   -given multiple comorbidities, patient is not a candidate for hemodialysis  2  Hyperkalemia:  Presented with serum potassium of 6 8 and elevated   -this was secondary to #1   -after initiation of bicarbonate based fluids, potassium has improved  3  Volume status:  Noted slight increase in abdominal distension  -reviewed x-ray findings of right opacification suggestive of pleural effusion  -who recommend continuation of fluids with Pierce catheter insertion for documentation of strict intake and output  4  Liver cirrhosis:  Often undergoes decompensation with associated hepatic encephalopathy     -lactose on hold due to diarrhea  5  Atrial fibrillation:  Rate and rhythm uncontrolled  -recommend introduction of diltiazem          Miriam Dennis MD  Nephrology  4/25/2021 good, to achieve stated therapy goals

## 2022-09-06 NOTE — PROGRESS NOTE ADULT - PROBLEM SELECTOR PLAN 3
Pt likely has fluid overload after exceeding her fluid restriction 9/4 vs new onset HF. Previous transplant in 2013 but progressed to ESRD given chronic obstructive nephropathy secondary to neurogenic bladder w/ PMH of spina bifida. Straight caths at home  - CXR showed findings consistent with pulmonary edema and   - EKG normal  - Nephro aware, f/u final recs  - Per nephro, c/w home revela and cinacalcet  - Pt's HD schedule is mon wed frid  - Hyperkalemia, pts K was 6.1 on admission   - s/p 10mg PO lokelma in ED  - Received HD 9/5 upon admission  - Repeat labs as above, also VBG at 4pm Pt meets criteria based on WBC count, HR, possible source (UTI vs pneumonia vs gallbladder).  - WBC was 17.42 on admission, no bandemia but procal elevated to 1.01  - WBC now 7.73  - UA grossly positive  - s/p CTX in ED  - c/w CTX x 5 days for suspected UTI. Pt has hx of chronic UTI and comorbid conditions,+ systemic symptoms,  so longer course prefered  - on methenamine 1g QD for UTI prophylaxis. Can restart on discharge. Consider adding ascorbic acid to acidify urine and aid function of methenamine.   - f/u blood and urine cx

## 2022-09-06 NOTE — PHYSICAL THERAPY INITIAL EVALUATION ADULT - PERTINENT HX OF CURRENT PROBLEM, REHAB EVAL
42 y/o F w/ PMH of spinal bifida, VPS early in life (last revised at the age of 10), ESRD on HD with RUE graft/stent and renal transplant in 2013 presents with acute onset SOB after exceeding her fluid restriction the day prior. CXR showed b/l pleural effusions, labs revealed hyperkalemia, leukocytosis. Pt likely has fluid overload in the setting of esrd vs new onset HF, admitted for workup.

## 2022-09-06 NOTE — PROGRESS NOTE ADULT - PROBLEM SELECTOR PLAN 1
Pt likely has fluid overload in the setting of ESRD after dietary nonadherence 9/4 vs new onset HF  - CXR showed b/l pleural effusions  - EKG normal  - Nephro aware, f/u recs  - Pt's HD schedule is mon wed frid  - Received HD 9/5 upon admission  - HF work up: TTE shows EF 64% Pt likely has fluid overload in the setting of ESRD after dietary nonadherence 9/4 vs new onset HF  - Markedly improved after dialysis , now satting well at RA with no SOB.   - CXR showed b/l pleural effusions  - EKG normal  - Nephro aware, f/u recs  - Pt's HD schedule is mon wed frid  - Received HD 9/5 upon admission  - HF work up: TTE shows EF 64%, normal LV systolic function, normal RV size/function, no significant valvular disease

## 2022-09-06 NOTE — PROGRESS NOTE ADULT - PROBLEM SELECTOR PLAN 2
Pt meets criteria based on WBC count, HR, possible source (UTI vs pneumonia vs gallbladder).  - WBC was 17.42 on admission, no bandemia but procal elevated to 1.01  - WBC now 7.73  - UA grossly positive  - s/p CTX in ED  - c/w CTX x 5 days for suspected UTI. Pt has hx of chronic UTI and comorbid conditions, so longer course prefered  - on methenamine 1g QD for UTI prophylaxis. Can restart on discharge. Consider adding ascorbic acid to acidify urine and aid function of methenamine.   - alk phos elevated to 265, now down to 208, questionable RUQ pain. RUQ US no longer necessary given likely UTI    - f/u blood and urine cx - On admission patient has RUQ tenderness, elevated alkaline phosphatase  - f/u RUQ US - On admission patient has RUQ tenderness, elevated alkaline phosphatase  - f/u RUQ US  - negative HepB sAg Neg, Hep B sAb neg, Hep B core Ab negative;   - Hep C Ab negative  - f/u Hep A eval

## 2022-09-06 NOTE — PROGRESS NOTE ADULT - PROBLEM SELECTOR PLAN 5
History of spina bifida. Walks at home with walker. Has some motor function and sensory function in all extremities but weakness/sensory loss particularly in lower extremities. Straight caths at home due to neurogenic bladder.  - cont to monitor for clinical changes  - PT consult Long hx of anemia, likely chronic disease though MCV increased.   - c/w home Iron  - Check B12 and folate levels  - Check EPO  - Continue to trend Hgb, transfuse if <7

## 2022-09-06 NOTE — PROGRESS NOTE ADULT - ATTENDING COMMENTS
40 yo F with PMHx of spinal bifida,  shunt early in life (last revised at the age of 10), ESRD (s/p renal transplant in 2013 with subsequent rejection ~8 years later, currently on HD via RUE AVF on M/W/F), who presents with acute onset SOB 2/2 pulm edema and found to be septic 2/2 UTI.    # hypoxic respiratory failure 2/2 pulmonary edema  - likely 2/2 dietary noncompliance  - markedly improved after dialysis on 9/5, now satting well on RA, no SOB  - CXR 9/5: Bibasilar hazy opacities likely representing atelectasis and/or small   pleural effusion.  - TTE 9/6 shows EF 64%, normal LV systolic function, normal RV size/function, no significant valvular disease    # sepsis  - UA positive  - c/w ceftriaxone (9/5-9/9   ), plan for 5 day course; complicated UTI with systemic manifestations   - f/u UCx, f/u BCx    # elevated liver enzymes   - f/u RUQ US 42 yo F with PMHx of spinal bifida,  shunt early in life (last revised at the age of 10), ESRD (s/p renal transplant in 2013 with subsequent rejection ~8 years later, currently on HD via RUE AVF on M/W/F), who presents with acute onset SOB 2/2 pulm edema and found to be septic 2/2 UTI.    # hypoxic respiratory failure 2/2 pulmonary edema  - likely 2/2 dietary noncompliance  - markedly improved after dialysis on 9/5, now satting well on RA, no SOB  - CXR 9/5: Bibasilar hazy opacities likely representing atelectasis and/or small   pleural effusion.  - TTE 9/6 shows EF 64%, normal LV systolic function, normal RV size/function, no significant valvular disease    # sepsis  - UA positive  - c/w ceftriaxone (9/5-9/9   ), plan for 5 day course; complicated UTI with systemic manifestations   - RVP + COVID both negative   - f/u UCx, f/u BCx    # elevated liver enzymes   - f/u RUQ US: Slightly increased echogenic liver likely secondary to fatty infiltration.                       Bile ducts and gallbladder within normal limits. No stones seen.  - negative HepB sAg Neg, Hep B sAb neg, Hep B core Ab negative;   - Hep C Ab negative  - f/u Hep A eval    - trend liver enzyme daily

## 2022-09-07 DIAGNOSIS — A41.9 SEPSIS, UNSPECIFIED ORGANISM: ICD-10-CM

## 2022-09-07 DIAGNOSIS — J96.01 ACUTE RESPIRATORY FAILURE WITH HYPOXIA: ICD-10-CM

## 2022-09-07 LAB
ALBUMIN SERPL ELPH-MCNC: 3.9 G/DL — SIGNIFICANT CHANGE UP (ref 3.3–5)
ALP SERPL-CCNC: 200 U/L — HIGH (ref 40–120)
ALT FLD-CCNC: 11 U/L — SIGNIFICANT CHANGE UP (ref 10–45)
ANION GAP SERPL CALC-SCNC: 20 MMOL/L — HIGH (ref 5–17)
AST SERPL-CCNC: 7 U/L — LOW (ref 10–40)
BILIRUB SERPL-MCNC: 0.2 MG/DL — SIGNIFICANT CHANGE UP (ref 0.2–1.2)
BUN SERPL-MCNC: 46 MG/DL — HIGH (ref 7–23)
CALCIUM SERPL-MCNC: 8.2 MG/DL — LOW (ref 8.4–10.5)
CHLORIDE SERPL-SCNC: 96 MMOL/L — SIGNIFICANT CHANGE UP (ref 96–108)
CO2 SERPL-SCNC: 25 MMOL/L — SIGNIFICANT CHANGE UP (ref 22–31)
CREAT SERPL-MCNC: 8.9 MG/DL — HIGH (ref 0.5–1.3)
EGFR: 5 ML/MIN/1.73M2 — LOW
EPO SERPL-MCNC: 11.9 MIU/ML — SIGNIFICANT CHANGE UP (ref 2.6–18.5)
GLUCOSE SERPL-MCNC: 99 MG/DL — SIGNIFICANT CHANGE UP (ref 70–99)
HAV IGM SER-ACNC: SIGNIFICANT CHANGE UP
HCT VFR BLD CALC: 27.9 % — LOW (ref 34.5–45)
HGB BLD-MCNC: 9 G/DL — LOW (ref 11.5–15.5)
MAGNESIUM SERPL-MCNC: 2.6 MG/DL — SIGNIFICANT CHANGE UP (ref 1.6–2.6)
MCHC RBC-ENTMCNC: 32.3 GM/DL — SIGNIFICANT CHANGE UP (ref 32–36)
MCHC RBC-ENTMCNC: 32.7 PG — SIGNIFICANT CHANGE UP (ref 27–34)
MCV RBC AUTO: 101.5 FL — HIGH (ref 80–100)
NRBC # BLD: 0 /100 WBCS — SIGNIFICANT CHANGE UP (ref 0–0)
PHOSPHATE SERPL-MCNC: 6.1 MG/DL — HIGH (ref 2.5–4.5)
PLATELET # BLD AUTO: 298 K/UL — SIGNIFICANT CHANGE UP (ref 150–400)
POTASSIUM SERPL-MCNC: 4.2 MMOL/L — SIGNIFICANT CHANGE UP (ref 3.5–5.3)
POTASSIUM SERPL-SCNC: 4.2 MMOL/L — SIGNIFICANT CHANGE UP (ref 3.5–5.3)
PROT SERPL-MCNC: 7.3 G/DL — SIGNIFICANT CHANGE UP (ref 6–8.3)
RBC # BLD: 2.75 M/UL — LOW (ref 3.8–5.2)
RBC # FLD: 13.6 % — SIGNIFICANT CHANGE UP (ref 10.3–14.5)
SODIUM SERPL-SCNC: 141 MMOL/L — SIGNIFICANT CHANGE UP (ref 135–145)
WBC # BLD: 8.56 K/UL — SIGNIFICANT CHANGE UP (ref 3.8–10.5)
WBC # FLD AUTO: 8.56 K/UL — SIGNIFICANT CHANGE UP (ref 3.8–10.5)

## 2022-09-07 PROCEDURE — 73630 X-RAY EXAM OF FOOT: CPT | Mod: 26,LT

## 2022-09-07 RX ORDER — METOCLOPRAMIDE HCL 10 MG
5 TABLET ORAL ONCE
Refills: 0 | Status: COMPLETED | OUTPATIENT
Start: 2022-09-07 | End: 2022-09-07

## 2022-09-07 RX ORDER — ERYTHROPOIETIN 10000 [IU]/ML
10000 INJECTION, SOLUTION INTRAVENOUS; SUBCUTANEOUS ONCE
Refills: 0 | Status: COMPLETED | OUTPATIENT
Start: 2022-09-07 | End: 2022-09-07

## 2022-09-07 RX ADMIN — Medication 650 MILLIGRAM(S): at 14:13

## 2022-09-07 RX ADMIN — CHLORHEXIDINE GLUCONATE 1 APPLICATION(S): 213 SOLUTION TOPICAL at 05:26

## 2022-09-07 RX ADMIN — Medication 5 MILLIGRAM(S): at 18:17

## 2022-09-07 RX ADMIN — Medication 81 MILLIGRAM(S): at 15:27

## 2022-09-07 RX ADMIN — ERYTHROPOIETIN 10000 UNIT(S): 10000 INJECTION, SOLUTION INTRAVENOUS; SUBCUTANEOUS at 10:28

## 2022-09-07 RX ADMIN — CLOPIDOGREL BISULFATE 75 MILLIGRAM(S): 75 TABLET, FILM COATED ORAL at 15:28

## 2022-09-07 RX ADMIN — CEFTRIAXONE 100 MILLIGRAM(S): 500 INJECTION, POWDER, FOR SOLUTION INTRAMUSCULAR; INTRAVENOUS at 05:26

## 2022-09-07 RX ADMIN — Medication 1 GRAM(S): at 17:39

## 2022-09-07 RX ADMIN — APIXABAN 2.5 MILLIGRAM(S): 2.5 TABLET, FILM COATED ORAL at 17:39

## 2022-09-07 RX ADMIN — SEVELAMER CARBONATE 800 MILLIGRAM(S): 2400 POWDER, FOR SUSPENSION ORAL at 17:39

## 2022-09-07 RX ADMIN — Medication 500 MILLIGRAM(S): at 15:27

## 2022-09-07 RX ADMIN — Medication 1 TABLET(S): at 15:27

## 2022-09-07 RX ADMIN — Medication 1 MILLIGRAM(S): at 15:27

## 2022-09-07 RX ADMIN — AMLODIPINE BESYLATE 10 MILLIGRAM(S): 2.5 TABLET ORAL at 12:49

## 2022-09-07 RX ADMIN — GABAPENTIN 100 MILLIGRAM(S): 400 CAPSULE ORAL at 21:35

## 2022-09-07 RX ADMIN — SEVELAMER CARBONATE 800 MILLIGRAM(S): 2400 POWDER, FOR SUSPENSION ORAL at 15:27

## 2022-09-07 RX ADMIN — Medication 650 MILLIGRAM(S): at 12:44

## 2022-09-07 RX ADMIN — APIXABAN 2.5 MILLIGRAM(S): 2.5 TABLET, FILM COATED ORAL at 05:26

## 2022-09-07 RX ADMIN — CINACALCET 30 MILLIGRAM(S): 30 TABLET, FILM COATED ORAL at 15:28

## 2022-09-07 RX ADMIN — Medication 5 MILLIGRAM(S): at 21:34

## 2022-09-07 NOTE — PROVIDER CONTACT NOTE (OTHER) - ASSESSMENT
Called mom again will mail her results of covid test and she wanted to f/u with pulmonologist  Patient A&O4, Patient denies CP, SOB, palpitations. -10. VSS. Patient is off telemetry.  Patient stated takes metoprolol 100 mg PO QD at home.

## 2022-09-07 NOTE — PROGRESS NOTE ADULT - PROBLEM SELECTOR PLAN 1
Pt likely has fluid overload in the setting of ESRD after dietary nonadherence 9/4 vs new onset HF  - Markedly improved after dialysis , now satting well at RA with no SOB  - CXR on admission showed b/l pleural effusions  - EKG normal  - Nephro aware, f/u recs  - Pt's HD schedule is mon wed frid  - Received HD 9/5 upon admission, will receive HD today  - HF work up: TTE shows EF 64%, normal LV systolic function, normal RV size/function, no significant valvular disease ·  Problem: Sepsis with acute hypoxic respiratory failure.   ·  Plan: Pt likely has fluid overload in the setting of ESRD after dietary nonadherence 9/4 vs new onset HF  - Markedly improved after dialysis , now satting well at RA with no SOB.   - CXR showed b/l pleural effusions  - EKG normal  - Nephro aware, f/u recs  - Pt's HD schedule is mon wed frid  - Received HD 9/5 upon admission  - HD today 9/7  - HF work up: TTE shows EF 64%, normal LV systolic function, normal RV size/function, no significant valvular disease.

## 2022-09-07 NOTE — PROVIDER CONTACT NOTE (OTHER) - ACTION/TREATMENT ORDERED:
Team Joseline Hamm notified. Team Joseline Hamm notified. Orders received via teams to place patient on telemetry cardiac monitoring after HD session.

## 2022-09-07 NOTE — PROVIDER CONTACT NOTE (OTHER) - SITUATION
Patient admitted to [address] [place] [address] with wound on left heel. Patient and mother state this is a chronic wound. They apply bacitracin to is daily. Wound has a foul odor and has drainage
Tachycardia - 105

## 2022-09-07 NOTE — PROGRESS NOTE ADULT - PROBLEM SELECTOR PLAN 5
Long hx of anemia, likely chronic disease though MCV increased.   - c/w home Iron  - B12 and folate levels wnl  - Check EPO  - Continue to trend Hgb, transfuse if <7 Pt likely has fluid overload after exceeding her fluid restriction 9/4 vs new onset HF. Previous transplant in 2013 but progressed to ESRD given chronic obstructive nephropathy secondary to neurogenic bladder w/ PMH of spina bifida. Straight caths at home  - CXR showed findings consistent with pulmonary edema   - EKG normal  - Nephro aware, f/u final recs  - Per nephro, c/w home revela and cinacalcet  - Pt's HD schedule is mon wed frid  - Received HD 9/5 upon admission  - Will receive HD today 9/7  - Hyperkalemia, pts K was 6.1 on admission, now resolved  - s/p 10mg PO lokelma in ED  - Repeat labs

## 2022-09-07 NOTE — CONSULT NOTE ADULT - SUBJECTIVE AND OBJECTIVE BOX
Podiatry pager #: [phone]/ [phone]    Patient is a 41y old  Female who presents with a chief complaint of SOB (07 Sep 2022 09:46)      HPI:  40 y/o F w/ PMH of spinal bifida, VPS early in life (last revised at the age of 10), ESRD on HD with RUE graft/stent and renal transplant in 2013 presents with acute onset SOB. Pt felt well until she awoke at 2:30am 9/4 with severe SOB which improved with sitting up. She was unable to speak and texted her mother in the next room. At 4am she was brought to the ED. Pt denies ever having had similar symptoms. Her normal HD schedule is Mon Wed Frid, last HE Frid 9/2. Pt notes that prior to the episode of SOB she was very thirsty and likely exceeded her fluid restriction. She also notes pain and swelling in b/l lower extremities. Denies recent illness, fever, chills, headache, nausea, vomiting, changes in urination/BMS. Of note, pt has limited sensation below the umbilicus and self catheterizes 3-4 times daily, each time yielding 20-30ccs of urine. She is on ASA/plavix as well as Eliquis due to her dialysis stent graft having multiple episodes of clotting off requiring intervention.    In the ED, pt received albuterol neb and O2 by NC and SOB improved. She also received 10mg PO lokelma for K elevated to 6.1 and empiric CTX for WBC 17.42. CXR showed b/l small pleural effusions. Nephro on board, will go for HD this morning.      (05 Sep 2022 10:27)      PAST MEDICAL & SURGICAL HISTORY:  Hydrocephalus      Spina bifida      ESRD (end stage renal disease) on dialysis      S/P  shunt      Kidney transplanted          MEDICATIONS  (STANDING):  amLODIPine   Tablet 10 milliGRAM(s) Oral daily  apixaban 2.5 milliGRAM(s) Oral every 12 hours  ascorbic acid 500 milliGRAM(s) Oral daily  aspirin enteric coated 81 milliGRAM(s) Oral daily  BACItracin   Ointment 1 Application(s) Topical daily  cefTRIAXone   IVPB 1000 milliGRAM(s) IV Intermittent every 24 hours  chlorhexidine 2% Cloths 1 Application(s) Topical <User Schedule>  cinacalcet 30 milliGRAM(s) Oral daily  clopidogrel Tablet 75 milliGRAM(s) Oral daily  ferrous    sulfate 325 milliGRAM(s) Oral <User Schedule>  folic acid 1 milliGRAM(s) Oral daily  gabapentin 100 milliGRAM(s) Oral at bedtime  influenza   Vaccine 0.5 milliLiter(s) IntraMuscular once  multivitamin 1 Tablet(s) Oral daily  omega-3-Acid Ethyl Esters 1 Gram(s) Oral every 24 hours  sevelamer carbonate 800 milliGRAM(s) Oral three times a day with meals    MEDICATIONS  (PRN):  acetaminophen     Tablet .. 650 milliGRAM(s) Oral every 6 hours PRN Temp greater or equal to 38C (100.4F), Mild Pain (1 - 3), Moderate Pain (4 - 6)  melatonin 5 milliGRAM(s) Oral at bedtime PRN Insomnia      Allergies    latex (Anaphylaxis; Rash)  No Known Drug Allergies    Intolerances        VITALS:    Vital Signs Last 24 Hrs  T(C): 36.5 (07 Sep 2022 09:43), Max: 37.6 (06 Sep 2022 16:10)  T(F): 97.7 (07 Sep 2022 09:43), Max: 99.6 (06 Sep 2022 16:10)  HR: 96 (07 Sep 2022 09:43) (93 - 106)  BP: 171/93 (07 Sep 2022 09:43) (145/82 - 171/93)  BP(mean): --  RR: 18 (07 Sep 2022 09:43) (18 - 18)  SpO2: 95% (07 Sep 2022 09:43) (93% - 95%)    Parameters below as of 07 Sep 2022 09:43  Patient On (Oxygen Delivery Method): room air        LABS:                          9.0    8.56  )-----------( 298      ( 07 Sep 2022 05:57 )             27.9       09-07    141  |  96  |  46<H>  ----------------------------<  99  4.2   |  25  |  8.90<H>    Ca    8.2<L>      07 Sep 2022 05:58  Phos  6.1     09-07  Mg     2.6     09-07    TPro  7.3  /  Alb  3.9  /  TBili  0.2  /  DBili  x   /  AST  7<L>  /  ALT  11  /  AlkPhos  200<H>  09-07      CAPILLARY BLOOD GLUCOSE              LOWER EXTREMITY PHYSICAL EXAM:    Vascular: DP/PT 1/4 B/L, CFT <3 seconds B/L, Temperature gradient warm to cool B/L  Neuro: Epicritic sensation diminished to the level of ankle B/L  Musculoskeletal/Ortho: calcaneal gait 2/2 spina bifida  Skin: L plantar heel hyperkeratosis with underlying wound to subQ with fibrous wound bed, no clinical signs of infection, no probe to bone, no malodor, and no erythema. R foot with no wounds or signs of infection.         RADIOLOGY & ADDITIONAL STUDIES:

## 2022-09-07 NOTE — CONSULT NOTE ADULT - ASSESSMENT
41F PMH Spina Bifida, hydrocephalus w/ L plantar heel wound   -Pt seen and evaluated  -Labs/vitals stable, no leukocytosis  -L plantar heel hyperkeratotic lesion withunderlying wound to subQ with fibrous woud bed, no clinial sigsn of infection, no probe to bone, no malodor, and no erythema. R foot without signs of infection  - Verbal consent obtained for L foot debridement, Sharp excisional debridement of callus using #15 blade to level of but not beyond subQ tissue. No drainage or purulence, no acute signs of infection, wound chronic and stable in nature.  -Pt has a hx of partial calcanectomy on L foot, has seen podiatrist Dr. Lopez, goes every 2 weeks.  -No podiatric surgical intervention at this time, podiatry plan for local wound care   -Stable for d/c from podiatry   -Follow up w/ outside pod or w/ Dr. Young at the wound care center ([address], [address]) within the week, call [phone] for appointment.  -Discussed w/ attending   41F PMH Spina Bifida, hydrocephalus w/ L plantar heel wound   -Pt seen and evaluated  -Labs/vitals stable, no leukocytosis  -L plantar heel hyperkeratotic lesion withunderlying wound to subQ with fibrous woud bed, no clinial sigsn of infection, no probe to bone, no malodor, and no erythema. R foot without signs of infection  - Verbal consent obtained for L foot debridement, Sharp excisional debridement of callus using #15 blade to level of but not beyond subQ tissue. No drainage or purulence, no acute signs of infection, wound chronic and stable in nature.  - Ordered L foot xray   - Daily care with betadine paint, 4x4 gauze, ABD pads, and isaac  -Pt has a hx of partial calcanectomy on L foot, has seen podiatrist Dr. Lopez, goes every 2 weeks.  -No podiatric surgical intervention at this time, podiatry plan for local wound care   -Stable for d/c from podiatry   -Follow up w/ outside pod or w/ Dr. Young at the wound care center ([address], [address]) within the week, call [phone] for appointment.  -Discussed w/ attending   41F PMH Spina Bifida, hydrocephalus w/ L plantar heel wound   -Pt seen and evaluated  -Labs/vitals stable, no leukocytosis  -L plantar heel hyperkeratotic lesion withunderlying wound to subQ with fibrous woud bed, no clinial sigsn of infection, no probe to bone, no malodor, and no erythema. R foot without signs of infection  - Verbal consent obtained for L foot debridement, Sharp excisional debridement of callus using #15 blade to level of but not beyond subQ tissue. No drainage or purulence, no acute signs of infection, wound chronic and stable in nature.  - Ordered L foot xray   - Daily care with betadine paint, 4x4 gauze, ABD pads, and isaac  - Z flows to be worn at all times while in house  -Pt has a hx of partial calcanectomy on L foot, has seen podiatrist Dr. Lopez, goes every 2 weeks.  -No podiatric surgical intervention at this time, podiatry plan for local wound care   -Stable for d/c from podiatry   -Follow up w/ outside pod or w/ Dr. Young at the wound care center ([address], [address]) within the week, call [phone] for appointment.  -Discussed w/ attending

## 2022-09-07 NOTE — PROGRESS NOTE ADULT - ATTENDING COMMENTS
42 yo F with PMHx of spinal bifida,  shunt early in life (last revised at the age of 10), ESRD (s/p renal transplant in 2013 with subsequent rejection ~8 years later, currently on HD via RUE AVF on M/W/F), who presents with acute onset SOB 2/2 pulm edema and found to be septic 2/2 UTI.    # hypoxic respiratory failure 2/2 pulmonary edema, now resolved  - likely 2/2 dietary noncompliance  - markedly improved after dialysis on 9/5, now satting well on RA, no SOB  - CXR 9/5: Bibasilar hazy opacities likely representing atelectasis and/or small   pleural effusion.  - TTE 9/6 shows EF 64%, normal LV systolic function, normal RV size/function, no significant valvular disease    # sepsis  - UA with large leuk esterase and pyuria, however UCx normal urogenic damian  - c/w ceftriaxone (9/5-9/9   ), plan for 5 day course; complicated UTI with systemic manifestations   - RVP + COVID both negative   - f/u UCx, f/u BCx    # Elevated alk phos  - Likely secondary to renal dysfunction  - F/u GGT  - RUQ US: Slightly increased echogenic liver likely secondary to fatty infiltration.                       Bile ducts and gallbladder within normal limits. No stones seen.  - negative HepB sAg Neg, Hep B sAb neg, Hep B core Ab negative;   - Hep C Ab negative  - f/u Hep A eval

## 2022-09-07 NOTE — PROGRESS NOTE ADULT - PROBLEM SELECTOR PLAN 3
Pt meets criteria based on WBC count, HR, possible source (UTI vs pneumonia vs gallbladder).  - WBC was 17.42 on admission, no bandemia but procal elevated to 1.01  - WBC now 7.73  - UA grossly positive  - s/p CTX in ED  - c/w CTX x 5 days for suspected UTI. Pt has hx of chronic UTI and comorbid conditions,+ systemic symptoms,  so longer course preferred  - on methenamine 1g QD for UTI prophylaxis. Can restart on discharge. Consider adding ascorbic acid to acidify urine and aid function of methenamine  - blood cx shows NGTD  - urine cx grew normal damian Pt meets criteria based on WBC count, HR, possible source (UTI vs pneumonia vs gallbladder vs cellulitis).  - WBC was 17.42 on admission, no bandemia but procal elevated to 1.01  - WBC now 7.73  - UA grossly positive  - s/p CTX in ED  - c/w CTX x 5 days for suspected UTI. Pt has hx of chronic UTI and comorbid conditions,+ systemic symptoms,  so longer course preferred  - on methenamine 1g QD for UTI prophylaxis. Can restart on discharge. Consider adding ascorbic acid to acidify urine and aid function of methenamine  - blood cx shows NGTD  - urine cx grew normal damian  - f/u podiatry consult for chronic L foot wound, possible cellulitis

## 2022-09-07 NOTE — PROGRESS NOTE ADULT - PROBLEM SELECTOR PLAN 2
- On admission patient has RUQ tenderness, elevated alkaline phosphatase  - RUQ US 9/6 showed fatty liver, no gallstones, atrophic R kidney (no hydro)  - negative HepB sAg Neg, Hep B sAb neg, Hep B core Ab negative;   - Hep C Ab negative  - f/u Hep A eval - On admission patient has RUQ tenderness, elevated alkaline phosphatase  - RUQ US 9/6 showed fatty liver, no gallstones, atrophic R kidney (no hydro)  - negative HepB sAg Neg, Hep B sAb neg, Hep B core Ab negative;   - Hep C Ab negative  - f/u Hep A eval  - f/u GGT

## 2022-09-07 NOTE — PROGRESS NOTE ADULT - PROBLEM SELECTOR PLAN 7
Chronic wound of L heel  - apply bacitracin to wound daily per routine  - wound consult placed History of spina bifida. Walks at home with walker. Has some motor function and sensory function in all extremities but weakness/sensory loss particularly in lower extremities. Straight caths at home due to neurogenic bladder.  - cont to monitor for clinical changes  - PT saw pt, appreciate recs

## 2022-09-07 NOTE — PROGRESS NOTE ADULT - PROBLEM SELECTOR PLAN 6
History of spina bifida. Walks at home with walker. Has some motor function and sensory function in all extremities but weakness/sensory loss particularly in lower extremities. Straight caths at home due to neurogenic bladder.  - cont to monitor for clinical changes  - PT saw pt, appreciate recs Long hx of anemia, likely chronic disease though MCV increased.   - c/w home Iron  - B12 and folate levels wnl  - Check EPO  - f/u TSH  - Continue to trend Hgb, transfuse if <7

## 2022-09-07 NOTE — PROGRESS NOTE ADULT - PROBLEM SELECTOR PLAN 4
Pt likely has fluid overload after exceeding her fluid restriction 9/4 vs new onset HF. Previous transplant in 2013 but progressed to ESRD given chronic obstructive nephropathy secondary to neurogenic bladder w/ PMH of spina bifida. Straight caths at home  - CXR showed findings consistent with pulmonary edema   - EKG normal  - Nephro aware, f/u final recs  - Per nephro, c/w home revela and cinacalcet  - Pt's HD schedule is mon wed frid  - will receive HD today  - Hyperkalemia, pts K was 6.1 on admission, now resolved  - s/p 10mg PO lokelma in ED  - Received HD 9/5 upon admission  - Repeat labs Chronic wound of L heel  - apply bacitracin to wound daily per routine  - wound consult placed, podiatry to see pt today 9/7  - possible source of sepsis

## 2022-09-07 NOTE — PROGRESS NOTE ADULT - SUBJECTIVE AND OBJECTIVE BOX
NASRA PELAYO  41y  MRN: [id number]    Subjective:     Patient is a 41y old  Female who presents with a chief complaint of SOB (06 Sep 2022 14:26)      Interval history/overnight events: pt was tachy to 104, bp 150/80, in line with baseline      MEDICATIONS  (STANDING):  amLODIPine   Tablet 10 milliGRAM(s) Oral daily  apixaban 2.5 milliGRAM(s) Oral every 12 hours  ascorbic acid 500 milliGRAM(s) Oral daily  aspirin enteric coated 81 milliGRAM(s) Oral daily  BACItracin   Ointment 1 Application(s) Topical daily  cefTRIAXone   IVPB 1000 milliGRAM(s) IV Intermittent every 24 hours  chlorhexidine 2% Cloths 1 Application(s) Topical <User Schedule>  cinacalcet 30 milliGRAM(s) Oral daily  clopidogrel Tablet 75 milliGRAM(s) Oral daily  epoetin linda-epbx (RETACRIT) Injectable 56530 Unit(s) IV Push once  ferrous    sulfate 325 milliGRAM(s) Oral <User Schedule>  folic acid 1 milliGRAM(s) Oral daily  gabapentin 100 milliGRAM(s) Oral at bedtime  influenza   Vaccine 0.5 milliLiter(s) IntraMuscular once  multivitamin 1 Tablet(s) Oral daily  omega-3-Acid Ethyl Esters 1 Gram(s) Oral every 24 hours  sevelamer carbonate 800 milliGRAM(s) Oral three times a day with meals    MEDICATIONS  (PRN):  acetaminophen     Tablet .. 650 milliGRAM(s) Oral every 6 hours PRN Temp greater or equal to 38C (100.4F), Mild Pain (1 - 3), Moderate Pain (4 - 6)  melatonin 5 milliGRAM(s) Oral at bedtime PRN Insomnia        Objective:    Vitals: Vital Signs Last 24 Hrs  T(C): 36.7 (09-07-22 @ 08:23), Max: 37.6 (09-06-22 @ 16:10)  T(F): 98.1 (09-07-22 @ 08:23), Max: 99.6 (09-06-22 @ 16:10)  HR: 105 (09-07-22 @ 08:23) (93 - 106)  BP: 150/74 (09-07-22 @ 08:23) (145/82 - 153/80)  BP(mean): --  RR: 18 (09-07-22 @ 08:23) (18 - 18)  SpO2: 95% (09-07-22 @ 08:23) (93% - 95%)            I&O's Summary    06 Sep 2022 07:01  -  07 Sep 2022 07:00  --------------------------------------------------------  IN: 780 mL / OUT: 1 mL / NET: 779 mL        PHYSICAL EXAM:  GENERAL: NAD  HEENT: PERRL, no scleral icterus, no head and neck lad   CHEST/LUNG: CTAB, no wheezing, crackles, or ronchi   HEART: RRR, normal S1, S2, no murmurs, gallops, or rubs appreciated   ABDOMEN: soft, nondistended, non-tender, normoactive, no HSM, no rebound, no guarding, no rigidity  SKIN: No rashes or lesions  NERVOUS SYSTEM: Alert & Oriented X3  EXT: no peripheral edema  PSYCH: calm and cooperative     LABS:    09-07    141  |  96  |  46<H>  ----------------------------<  99  4.2   |  25  |  8.90<H>  09-06    140  |  97  |  25<H>  ----------------------------<  90  3.8   |  30  |  6.20<H>  09-05    140  |  94<L>  |  15  ----------------------------<  154<H>  3.7   |  27  |  4.08<H>    Ca    8.2<L>      07 Sep 2022 05:58  Ca    9.0      06 Sep 2022 05:20  Ca    9.3      05 Sep 2022 16:34  Phos  6.1     09-07  Mg     2.6     09-07    TPro  7.3  /  Alb  3.9  /  TBili  0.2  /  DBili  x   /  AST  7<L>  /  ALT  11  /  AlkPhos  200<H>  09-07  TPro  7.2  /  Alb  4.0  /  TBili  0.3  /  DBili  x   /  AST  9<L>  /  ALT  13  /  AlkPhos  208<H>  09-06  TPro  8.4<H>  /  Alb  4.3  /  TBili  0.3  /  DBili  x   /  AST  12  /  ALT  16  /  AlkPhos  263<H>  09-05                                            9.0    8.56  )-----------( 298      ( 07 Sep 2022 05:57 )             27.9                         9.0    7.73  )-----------( 290      ( 06 Sep 2022 05:20 )             28.7                         10.4   17.42 )-----------( 341      ( 05 Sep 2022 04:52 )             33.3     CAPILLARY BLOOD GLUCOSE    RADIOLOGY       ACC: [id number] EXAM:  US ABDOMEN RT UPR QUADRANT                          PROCEDURE DATE:  09/06/2022          INTERPRETATION:  CLINICAL INFORMATION: Patient with elevated alkaline   phosphatase and right upper quadrant tenderness, evaluate for   cholecystitis.    COMPARISON: None available.    TECHNIQUE: Sonography of the right upper quadrant.    FINDINGS:  Limited exam due to patient positioning and gas.  Liver: Slightly increased echogenicity likely secondary to fatty   infiltration.  Bile ducts: Normal caliber. Common bile duct measures 4.3 mm.  Gallbladder: Within normal limits.  Pancreas: Visualized portions are within normal limits.  Right kidney: Atrophic kidney measuring 7.61 cm. No hydronephrosis.  Ascites: None.  IVC: Visualized portions are within normal limits.    IMPRESSION:  Slightly increased echogenic liver likely secondary to fatty infiltration.    Bile ducts and gallbladder within normal limits. No stones seen.    Atrophic right kidney. No hydronephrosis.          ------------------------------------------------------------------------  PROCEDURE: Transthoracic echocardiogram with 2-D, M-Mode  and complete spectral and color flow Doppler.  INDICATION: Heart failure, unspecified (I50.9)  ------------------------------------------------------------------------  Dimensions:    Normal Values:  LA:     3.8    2.0 - 4.0 cm  Ao:     2.7    2.0 - 3.8 cm  SEPTUM: 1.3    0.6 - 1.2 cm  PWT:    1.1    0.6 - 1.1 cm  LVIDd:  4.3    3.0 - 5.6 cm  LVIDs:  2.6    1.8 - 4.0 cm  Derived variables:  LVMI: 106 g/m2  RWT: 0.51  Fractional short: 40 %  EF (Dejesus Rule): 64 %Doppler Peak Velocity (m/sec):  AoV=1.8  ------------------------------------------------------------------------  Observations:  Mitral Valve: Normal mitral valve. Minimal mitral  regurgitation.  Aortic Valve/Aorta: Calcified trileaflet aortic valve with  normal opening. Peak transaortic valve gradient equals 13  mm Hg, mean transaortic valve gradient equals 1 mm Hg,  estimated aortic valve area equals 2 sqcm (by continuity  equation), aortic valve velocity time integral equals 31  cm.  Peak left ventricular outflow tract gradient equals 6  mm Hg, mean gradient is equal to 1 mm Hg, LVOT velocity  time integral equals 22 cm.  Aortic Root: 2.7 cm.  Ascending Aorta: 2.8 cm.  LVOT diameter: 1.9 cm.  Left Atrium: Normal left atrium.  LA volume index = 28  cc/m2.  Left Ventricle: Normal left ventricular systolic function.  No segmental wall motion abnormalities. Mild concentric  left ventricular hypertrophy. Normal diastolic function  Right Heart: Normal right atrium. Normal right ventricular  size and function. Normal tricuspid valve. Mild tricuspid  regurgitation. Normal pulmonic valve.  Pericardium/Pleura: Trace pericardial effusion.  ------------------------------------------------------------------------  Conclusions:  1. Mild concentric left ventricular hypertrophy.  2. Normal left ventricular systolic function. No segmental  wall motion abnormalities.  3. Normal right ventricular size and function.  4. Trace pericardial effusion.             NASRA PELAYO  41y  MRN: [id number]    Subjective: Pt feels well, denies fever, chill, nausea, vomiting, diarrhea. Endorses some SOB on ambulation.     Patient is a 41y old  Female who presents with a chief complaint of SOB (06 Sep 2022 14:26)      Interval history/overnight events: pt was tachy to 104, bp 150/80, in line with baseline      MEDICATIONS  (STANDING):  amLODIPine   Tablet 10 milliGRAM(s) Oral daily  apixaban 2.5 milliGRAM(s) Oral every 12 hours  ascorbic acid 500 milliGRAM(s) Oral daily  aspirin enteric coated 81 milliGRAM(s) Oral daily  BACItracin   Ointment 1 Application(s) Topical daily  cefTRIAXone   IVPB 1000 milliGRAM(s) IV Intermittent every 24 hours  chlorhexidine 2% Cloths 1 Application(s) Topical <User Schedule>  cinacalcet 30 milliGRAM(s) Oral daily  clopidogrel Tablet 75 milliGRAM(s) Oral daily  epoetin linda-epbx (RETACRIT) Injectable 15765 Unit(s) IV Push once  ferrous    sulfate 325 milliGRAM(s) Oral <User Schedule>  folic acid 1 milliGRAM(s) Oral daily  gabapentin 100 milliGRAM(s) Oral at bedtime  influenza   Vaccine 0.5 milliLiter(s) IntraMuscular once  multivitamin 1 Tablet(s) Oral daily  omega-3-Acid Ethyl Esters 1 Gram(s) Oral every 24 hours  sevelamer carbonate 800 milliGRAM(s) Oral three times a day with meals    MEDICATIONS  (PRN):  acetaminophen     Tablet .. 650 milliGRAM(s) Oral every 6 hours PRN Temp greater or equal to 38C (100.4F), Mild Pain (1 - 3), Moderate Pain (4 - 6)  melatonin 5 milliGRAM(s) Oral at bedtime PRN Insomnia        Objective:    Vitals: Vital Signs Last 24 Hrs  T(C): 36.7 (09-07-22 @ 08:23), Max: 37.6 (09-06-22 @ 16:10)  T(F): 98.1 (09-07-22 @ 08:23), Max: 99.6 (09-06-22 @ 16:10)  HR: 105 (09-07-22 @ 08:23) (93 - 106)  BP: 150/74 (09-07-22 @ 08:23) (145/82 - 153/80)  BP(mean): --  RR: 18 (09-07-22 @ 08:23) (18 - 18)  SpO2: 95% (09-07-22 @ 08:23) (93% - 95%)            I&O's Summary    06 Sep 2022 07:01  -  07 Sep 2022 07:00  --------------------------------------------------------  IN: 780 mL / OUT: 1 mL / NET: 779 mL        PHYSICAL EXAM:  GENERAL: NAD  HEENT: PERRL, no scleral icterus, no head and neck lad   CHEST/LUNG: CTAB, no wheezing, crackles, or ronchi   HEART: RRR, normal S1, S2, no murmurs, gallops, or rubs appreciated   ABDOMEN: soft, nondistended, non-tender, normoactive, no HSM, no rebound, no guarding, no rigidity  SKIN: No rashes or lesions  NERVOUS SYSTEM: Alert & Oriented X3  EXT: no peripheral edema, chronic wound L heel  PSYCH: calm and cooperative     LABS:    09-07    141  |  96  |  46<H>  ----------------------------<  99  4.2   |  25  |  8.90<H>  09-06    140  |  97  |  25<H>  ----------------------------<  90  3.8   |  30  |  6.20<H>  09-05    140  |  94<L>  |  15  ----------------------------<  154<H>  3.7   |  27  |  4.08<H>    Ca    8.2<L>      07 Sep 2022 05:58  Ca    9.0      06 Sep 2022 05:20  Ca    9.3      05 Sep 2022 16:34  Phos  6.1     09-07  Mg     2.6     09-07    TPro  7.3  /  Alb  3.9  /  TBili  0.2  /  DBili  x   /  AST  7<L>  /  ALT  11  /  AlkPhos  200<H>  09-07  TPro  7.2  /  Alb  4.0  /  TBili  0.3  /  DBili  x   /  AST  9<L>  /  ALT  13  /  AlkPhos  208<H>  09-06  TPro  8.4<H>  /  Alb  4.3  /  TBili  0.3  /  DBili  x   /  AST  12  /  ALT  16  /  AlkPhos  263<H>  09-05                                            9.0    8.56  )-----------( 298      ( 07 Sep 2022 05:57 )             27.9                         9.0    7.73  )-----------( 290      ( 06 Sep 2022 05:20 )             28.7                         10.4   17.42 )-----------( 341      ( 05 Sep 2022 04:52 )             33.3     CAPILLARY BLOOD GLUCOSE    RADIOLOGY       ACC: [id number] EXAM:  US ABDOMEN RT UPR QUADRANT                          PROCEDURE DATE:  09/06/2022          INTERPRETATION:  CLINICAL INFORMATION: Patient with elevated alkaline   phosphatase and right upper quadrant tenderness, evaluate for   cholecystitis.    COMPARISON: None available.    TECHNIQUE: Sonography of the right upper quadrant.    FINDINGS:  Limited exam due to patient positioning and gas.  Liver: Slightly increased echogenicity likely secondary to fatty   infiltration.  Bile ducts: Normal caliber. Common bile duct measures 4.3 mm.  Gallbladder: Within normal limits.  Pancreas: Visualized portions are within normal limits.  Right kidney: Atrophic kidney measuring 7.61 cm. No hydronephrosis.  Ascites: None.  IVC: Visualized portions are within normal limits.    IMPRESSION:  Slightly increased echogenic liver likely secondary to fatty infiltration.    Bile ducts and gallbladder within normal limits. No stones seen.    Atrophic right kidney. No hydronephrosis.          ------------------------------------------------------------------------  PROCEDURE: Transthoracic echocardiogram with 2-D, M-Mode  and complete spectral and color flow Doppler.  INDICATION: Heart failure, unspecified (I50.9)  ------------------------------------------------------------------------  Dimensions:    Normal Values:  LA:     3.8    2.0 - 4.0 cm  Ao:     2.7    2.0 - 3.8 cm  SEPTUM: 1.3    0.6 - 1.2 cm  PWT:    1.1    0.6 - 1.1 cm  LVIDd:  4.3    3.0 - 5.6 cm  LVIDs:  2.6    1.8 - 4.0 cm  Derived variables:  LVMI: 106 g/m2  RWT: 0.51  Fractional short: 40 %  EF (Dejesus Rule): 64 %Doppler Peak Velocity (m/sec):  AoV=1.8  ------------------------------------------------------------------------  Observations:  Mitral Valve: Normal mitral valve. Minimal mitral  regurgitation.  Aortic Valve/Aorta: Calcified trileaflet aortic valve with  normal opening. Peak transaortic valve gradient equals 13  mm Hg, mean transaortic valve gradient equals 1 mm Hg,  estimated aortic valve area equals 2 sqcm (by continuity  equation), aortic valve velocity time integral equals 31  cm.  Peak left ventricular outflow tract gradient equals 6  mm Hg, mean gradient is equal to 1 mm Hg, LVOT velocity  time integral equals 22 cm.  Aortic Root: 2.7 cm.  Ascending Aorta: 2.8 cm.  LVOT diameter: 1.9 cm.  Left Atrium: Normal left atrium.  LA volume index = 28  cc/m2.  Left Ventricle: Normal left ventricular systolic function.  No segmental wall motion abnormalities. Mild concentric  left ventricular hypertrophy. Normal diastolic function  Right Heart: Normal right atrium. Normal right ventricular  size and function. Normal tricuspid valve. Mild tricuspid  regurgitation. Normal pulmonic valve.  Pericardium/Pleura: Trace pericardial effusion.  ------------------------------------------------------------------------  Conclusions:  1. Mild concentric left ventricular hypertrophy.  2. Normal left ventricular systolic function. No segmental  wall motion abnormalities.  3. Normal right ventricular size and function.  4. Trace pericardial effusion.

## 2022-09-07 NOTE — PROGRESS NOTE ADULT - SUBJECTIVE AND OBJECTIVE BOX
[facility]   [phone]  RENAL FOLLOW UP NOTE  --------------------------------------------------------------------------------  Chief Complaint:    24 hour events/subjective:    seen on hd   denies sob     PAST HISTORY  --------------------------------------------------------------------------------  No significant changes to PMH, PSH, FHx, SHx, unless otherwise noted    ALLERGIES & MEDICATIONS  --------------------------------------------------------------------------------  Allergies    latex (Anaphylaxis; Rash)  No Known Drug Allergies    Intolerances      Standing Inpatient Medications  amLODIPine   Tablet 10 milliGRAM(s) Oral daily  apixaban 2.5 milliGRAM(s) Oral every 12 hours  ascorbic acid 500 milliGRAM(s) Oral daily  aspirin enteric coated 81 milliGRAM(s) Oral daily  BACItracin   Ointment 1 Application(s) Topical daily  cefTRIAXone   IVPB 1000 milliGRAM(s) IV Intermittent every 24 hours  chlorhexidine 2% Cloths 1 Application(s) Topical <User Schedule>  cinacalcet 30 milliGRAM(s) Oral daily  clopidogrel Tablet 75 milliGRAM(s) Oral daily  ferrous    sulfate 325 milliGRAM(s) Oral <User Schedule>  folic acid 1 milliGRAM(s) Oral daily  gabapentin 100 milliGRAM(s) Oral at bedtime  influenza   Vaccine 0.5 milliLiter(s) IntraMuscular once  multivitamin 1 Tablet(s) Oral daily  omega-3-Acid Ethyl Esters 1 Gram(s) Oral every 24 hours  sevelamer carbonate 800 milliGRAM(s) Oral three times a day with meals    PRN Inpatient Medications  acetaminophen     Tablet .. 650 milliGRAM(s) Oral every 6 hours PRN  melatonin 5 milliGRAM(s) Oral at bedtime PRN      REVIEW OF SYSTEMS  --------------------------------------------------------------------------------    Gen: denies  fevers/chills,  CVS: denies chest pain/palpitations  Resp: denies SOB/Cough  GI: Denies N/V/Abd pain  : Denies dysuria/oliguria/hematuria    All other systems were reviewed and are negative, except as noted.    VITALS/PHYSICAL EXAM  --------------------------------------------------------------------------------  T(C): 36.7 (09-07-22 @ 17:40), Max: 37.6 (09-06-22 @ 20:29)  HR: 108 (09-07-22 @ 17:40) (77 - 156)  BP: 118/72 (09-07-22 @ 17:40) (114/72 - 171/93)  RR: 18 (09-07-22 @ 17:40) (18 - 18)  SpO2: 97% (09-07-22 @ 17:40) (93% - 98%)  Wt(kg): --        09-06-22 @ 07:01  -  09-07-22 @ 07:00  --------------------------------------------------------  IN: 780 mL / OUT: 1 mL / NET: 779 mL    09-07-22 @ 07:01  -  09-07-22 @ 18:31  --------------------------------------------------------  IN: 200 mL / OUT: 2500 mL / NET: -2300 mL      Physical Exam:  	    Physical Exam:  	Gen: alert oriented place person and date   	Pulm: Decreased breath sounds b/l bases. no rales or ronchi or wheezing  	CV: RRR, S1/S2. no rub  	Abd: +BS, soft, nontender/nondistended  	: No suprapubic tenderness.               Extremity: No cyanosis, no edema no clubbing        LABS/STUDIES  --------------------------------------------------------------------------------              9.0    8.56  >-----------<  298      [09-07-22 @ 05:57]              27.9     141  |  96  |  46  ----------------------------<  99      [09-07-22 @ 05:58]  4.2   |  25  |  8.90        Ca     8.2     [09-07-22 @ 05:58]      Mg     2.6     [09-07-22 @ 05:58]      Phos  6.1     [09-07-22 @ 05:58]    TPro  7.3  /  Alb  3.9  /  TBili  0.2  /  DBili  x   /  AST  7   /  ALT  11  /  AlkPhos  200  [09-07-22 @ 05:58]          Creatinine Trend:  SCr 8.90 [09-07 @ 05:58]  SCr 6.20 [09-06 @ 05:20]  SCr 4.08 [09-05 @ 16:34]  SCr 9.47 [09-05 @ 04:52]              Urinalysis - [09-05-22 @ 05:57]      Color Light Yellow / Appearance Slightly Turbid / SG 1.009 / pH 8.5      Gluc Trace / Ketone Negative  / Bili Negative / Urobili Negative       Blood Negative / Protein 100 mg/dL / Leuk Est Large / Nitrite Negative      RBC 3 /  / Hyaline 8 / Gran  / Sq Epi  / Non Sq Epi 3 / Bacteria Negative      Iron 45, TIBC 154, %sat 29      [09-06-22 @ 05:20]  Ferritin 1298      [09-06-22 @ 05:20]  HbA1c 5.4      [01-22-20 @ 00:04]

## 2022-09-08 ENCOUNTER — TRANSCRIPTION ENCOUNTER (OUTPATIENT)
Age: 42
End: 2022-09-08

## 2022-09-08 DIAGNOSIS — R00.0 TACHYCARDIA, UNSPECIFIED: ICD-10-CM

## 2022-09-08 LAB
ALBUMIN SERPL ELPH-MCNC: 4.3 G/DL — SIGNIFICANT CHANGE UP (ref 3.3–5)
ALP SERPL-CCNC: 226 U/L — HIGH (ref 40–120)
ALT FLD-CCNC: 11 U/L — SIGNIFICANT CHANGE UP (ref 10–45)
ANION GAP SERPL CALC-SCNC: 20 MMOL/L — HIGH (ref 5–17)
AST SERPL-CCNC: 7 U/L — LOW (ref 10–40)
BILIRUB SERPL-MCNC: 0.3 MG/DL — SIGNIFICANT CHANGE UP (ref 0.2–1.2)
BUN SERPL-MCNC: 22 MG/DL — SIGNIFICANT CHANGE UP (ref 7–23)
CALCIUM SERPL-MCNC: 8.2 MG/DL — LOW (ref 8.4–10.5)
CALCIUM SERPL-MCNC: 8.5 MG/DL — SIGNIFICANT CHANGE UP (ref 8.4–10.5)
CHLORIDE SERPL-SCNC: 93 MMOL/L — LOW (ref 96–108)
CO2 SERPL-SCNC: 24 MMOL/L — SIGNIFICANT CHANGE UP (ref 22–31)
CREAT SERPL-MCNC: 5.35 MG/DL — HIGH (ref 0.5–1.3)
EGFR: 10 ML/MIN/1.73M2 — LOW
GGT SERPL-CCNC: 32 U/L — SIGNIFICANT CHANGE UP (ref 8–40)
GLUCOSE SERPL-MCNC: 81 MG/DL — SIGNIFICANT CHANGE UP (ref 70–99)
HCT VFR BLD CALC: 32.6 % — LOW (ref 34.5–45)
HGB BLD-MCNC: 10.4 G/DL — LOW (ref 11.5–15.5)
MAGNESIUM SERPL-MCNC: 2.4 MG/DL — SIGNIFICANT CHANGE UP (ref 1.6–2.6)
MCHC RBC-ENTMCNC: 31.9 GM/DL — LOW (ref 32–36)
MCHC RBC-ENTMCNC: 31.9 PG — SIGNIFICANT CHANGE UP (ref 27–34)
MCV RBC AUTO: 100 FL — SIGNIFICANT CHANGE UP (ref 80–100)
NRBC # BLD: 0 /100 WBCS — SIGNIFICANT CHANGE UP (ref 0–0)
PHOSPHATE SERPL-MCNC: 4.7 MG/DL — HIGH (ref 2.5–4.5)
PLATELET # BLD AUTO: 360 K/UL — SIGNIFICANT CHANGE UP (ref 150–400)
POTASSIUM SERPL-MCNC: 3.7 MMOL/L — SIGNIFICANT CHANGE UP (ref 3.5–5.3)
POTASSIUM SERPL-SCNC: 3.7 MMOL/L — SIGNIFICANT CHANGE UP (ref 3.5–5.3)
PROT SERPL-MCNC: 8.1 G/DL — SIGNIFICANT CHANGE UP (ref 6–8.3)
PTH-INTACT FLD-MCNC: 1239 PG/ML — HIGH (ref 15–65)
RBC # BLD: 3.26 M/UL — LOW (ref 3.8–5.2)
RBC # FLD: 13.5 % — SIGNIFICANT CHANGE UP (ref 10.3–14.5)
SODIUM SERPL-SCNC: 137 MMOL/L — SIGNIFICANT CHANGE UP (ref 135–145)
TSH SERPL-MCNC: 1.66 UIU/ML — SIGNIFICANT CHANGE UP (ref 0.27–4.2)
WBC # BLD: 9.5 K/UL — SIGNIFICANT CHANGE UP (ref 3.8–10.5)
WBC # FLD AUTO: 9.5 K/UL — SIGNIFICANT CHANGE UP (ref 3.8–10.5)

## 2022-09-08 PROCEDURE — 99232 SBSQ HOSP IP/OBS MODERATE 35: CPT

## 2022-09-08 RX ORDER — METOPROLOL TARTRATE 50 MG
100 TABLET ORAL ONCE
Refills: 0 | Status: COMPLETED | OUTPATIENT
Start: 2022-09-08 | End: 2022-09-08

## 2022-09-08 RX ORDER — FERROUS SULFATE 325(65) MG
1 TABLET ORAL
Qty: 0 | Refills: 0 | DISCHARGE

## 2022-09-08 RX ORDER — FERROUS SULFATE 325(65) MG
1 TABLET ORAL
Qty: 0 | Refills: 0 | DISCHARGE
Start: 2022-09-08

## 2022-09-08 RX ORDER — OXYBUTYNIN CHLORIDE 5 MG
1 TABLET ORAL
Qty: 0 | Refills: 0 | DISCHARGE

## 2022-09-08 RX ORDER — METOPROLOL TARTRATE 50 MG
100 TABLET ORAL DAILY
Refills: 0 | Status: DISCONTINUED | OUTPATIENT
Start: 2022-09-09 | End: 2022-09-09

## 2022-09-08 RX ORDER — ACETAMINOPHEN 500 MG
2 TABLET ORAL
Qty: 0 | Refills: 0 | DISCHARGE
Start: 2022-09-08

## 2022-09-08 RX ORDER — ASCORBIC ACID 60 MG
1 TABLET,CHEWABLE ORAL
Qty: 0 | Refills: 0 | DISCHARGE
Start: 2022-09-08

## 2022-09-08 RX ADMIN — CHLORHEXIDINE GLUCONATE 1 APPLICATION(S): 213 SOLUTION TOPICAL at 05:45

## 2022-09-08 RX ADMIN — Medication 100 MILLIGRAM(S): at 10:47

## 2022-09-08 RX ADMIN — Medication 1 APPLICATION(S): at 17:21

## 2022-09-08 RX ADMIN — Medication 1 TABLET(S): at 11:09

## 2022-09-08 RX ADMIN — Medication 81 MILLIGRAM(S): at 11:08

## 2022-09-08 RX ADMIN — CLOPIDOGREL BISULFATE 75 MILLIGRAM(S): 75 TABLET, FILM COATED ORAL at 11:08

## 2022-09-08 RX ADMIN — Medication 650 MILLIGRAM(S): at 17:16

## 2022-09-08 RX ADMIN — SEVELAMER CARBONATE 800 MILLIGRAM(S): 2400 POWDER, FOR SUSPENSION ORAL at 12:38

## 2022-09-08 RX ADMIN — SEVELAMER CARBONATE 800 MILLIGRAM(S): 2400 POWDER, FOR SUSPENSION ORAL at 08:19

## 2022-09-08 RX ADMIN — AMLODIPINE BESYLATE 10 MILLIGRAM(S): 2.5 TABLET ORAL at 05:38

## 2022-09-08 RX ADMIN — Medication 325 MILLIGRAM(S): at 08:19

## 2022-09-08 RX ADMIN — Medication 1 MILLIGRAM(S): at 11:08

## 2022-09-08 RX ADMIN — APIXABAN 2.5 MILLIGRAM(S): 2.5 TABLET, FILM COATED ORAL at 17:10

## 2022-09-08 RX ADMIN — Medication 650 MILLIGRAM(S): at 18:00

## 2022-09-08 RX ADMIN — Medication 500 MILLIGRAM(S): at 11:09

## 2022-09-08 RX ADMIN — CEFTRIAXONE 100 MILLIGRAM(S): 500 INJECTION, POWDER, FOR SOLUTION INTRAMUSCULAR; INTRAVENOUS at 05:45

## 2022-09-08 RX ADMIN — APIXABAN 2.5 MILLIGRAM(S): 2.5 TABLET, FILM COATED ORAL at 05:38

## 2022-09-08 RX ADMIN — Medication 1 GRAM(S): at 17:08

## 2022-09-08 RX ADMIN — GABAPENTIN 100 MILLIGRAM(S): 400 CAPSULE ORAL at 22:00

## 2022-09-08 RX ADMIN — CINACALCET 30 MILLIGRAM(S): 30 TABLET, FILM COATED ORAL at 12:37

## 2022-09-08 RX ADMIN — SEVELAMER CARBONATE 800 MILLIGRAM(S): 2400 POWDER, FOR SUSPENSION ORAL at 17:10

## 2022-09-08 NOTE — DISCHARGE NOTE PROVIDER - NSDCCPCAREPLAN_GEN_ALL_CORE_FT
PRINCIPAL DISCHARGE DIAGNOSIS  Diagnosis: Pulmonary edema, acute  Assessment and Plan of Treatment: Pulmonary edema is when fluid collects in the air sacs of the lungs. This makes it hard for a person to breathe. The condition is an emergency. It needs to be treated right away.  You came into the hospital feeling short of breath you were found to have pulmonary edema in your lungs. This is most likely because you drank too much fluids and had a salty diet. You were given dialysis and your symptoms improved. A TTE was performed and it did not show any evidence of heart failure.  - Please be sure to follow up with your primary care physician  - Please be sure to follow up with your nephrologist  - Please take medications as prescribed  - Please be sure to be adherent to your prescribed diet.      SECONDARY DISCHARGE DIAGNOSES  Diagnosis: Urinary tract infection  Assessment and Plan of Treatment: A urinary tract infection (UTI) is caused by bacteria that get inside your urinary tract. Your urinary tract includes your kidneys, ureters, bladder, and urethra. A UTI is more common in your lower urinary tract, which includes your bladder and urethra.  You came into the hospital and your white blood cell count was high. This was most likely because you had a UTI. You were given antibiotics and improved.  - Please be sure to follow up with your PCP.  - Please take your antibiotics as prescribed.  DISCHARGE INSTRUCTIONS:  Seek care immediately if:   •You are urinating very little or not at all.  •You have a high fever with shaking chills.  •You have side or back pain that gets worse.  Call your doctor if:   •You have a fever.  •You do not feel better after 2 days of taking antibiotics.  •You have new symptoms, such as blood or pus in your urine.  •You are vomiting.  •You have questions or concerns about your condition or care.       PRINCIPAL DISCHARGE DIAGNOSIS  Diagnosis: Pulmonary edema, acute  Assessment and Plan of Treatment: Pulmonary edema is when fluid collects in the air sacs of the lungs. This makes it hard for a person to breathe. The condition is an emergency. It needs to be treated right away.  You came into the hospital feeling short of breath you were found to have pulmonary edema in your lungs. This is most likely because you drank too much fluids and had a salty diet the week prior. You were given dialysis and your symptoms improved. An ultrasound of your heart was performed and it did not show any evidence of heart failure.  - Please be sure to follow up with your primary care physician  - Please be sure to follow up with your nephrologist  - Please take medications as prescribed  - Please be sure to be adherent to your prescribed diet      SECONDARY DISCHARGE DIAGNOSES  Diagnosis: Urinary tract infection  Assessment and Plan of Treatment: A urinary tract infection (UTI) is caused by bacteria that get inside your urinary tract. Your urinary tract includes your kidneys, ureters, bladder, and urethra. A UTI is more common in your lower urinary tract, which includes your bladder and urethra.  You came into the hospital and your white blood cell count was high. This was most likely because you had a UTI. You were given antibiotics and improved.  - Please be sure to follow up with your PCP.  - Please continue with your UTI prophylaxis  (methenamine) and consider taking vitamin C or cranberry juice with it. This makes your urine more acidic which helps the antibiotic to work better.  DISCHARGE INSTRUCTIONS:  Seek care immediately if:   •You are urinating very little or not at all.  •You have a high fever with shaking chills.  •You have side or back pain that gets worse.  Call your doctor if:   •You have a fever.  •You do not feel better after 2 days of taking antibiotics.  •You have new symptoms, such as blood or pus in your urine.  •You are vomiting.  •You have questions or concerns about your condition or care.       PRINCIPAL DISCHARGE DIAGNOSIS  Diagnosis: Pulmonary edema, acute  Assessment and Plan of Treatment: Pulmonary edema is when fluid collects in the air sacs of the lungs. This makes it hard for a person to breathe. The condition is an emergency. It needs to be treated right away.  You came into the hospital feeling short of breath you were found to have pulmonary edema in your lungs. This is most likely because you drank too much fluids and had a salty diet the week prior. You were given dialysis and your symptoms improved. An ultrasound of your heart was performed and it did not show any evidence of heart failure.  - Please be sure to follow up with your primary care physician  - Please be sure to follow up with your nephrologist  - Please take medications as prescribed  - Please be sure to be adherent to your prescribed diet      SECONDARY DISCHARGE DIAGNOSES  Diagnosis: Urinary tract infection  Assessment and Plan of Treatment: A urinary tract infection (UTI) is caused by bacteria that get inside your urinary tract. Your urinary tract includes your kidneys, ureters, bladder, and urethra. A UTI is more common in your lower urinary tract, which includes your bladder and urethra.  You came into the hospital and your white blood cell count was high. This was most likely because you had a UTI. You were given antibiotics and improved.  - Please be sure to follow up with your PCP.  - Please continue with your UTI prophylaxis  (methenamine) and consider taking vitamin C or cranberry juice with it. This makes your urine more acidic which helps the antibiotic to work better.  DISCHARGE INSTRUCTIONS:  Seek care immediately if:   •You are urinating very little or not at all.  •You have a high fever with shaking chills.  •You have side or back pain that gets worse.  Call your doctor if:   •You have a fever.  •You have new symptoms, such as blood or pus in your urine.  •You are vomiting.  •You have questions or concerns about your condition or care.

## 2022-09-08 NOTE — PROGRESS NOTE ADULT - PROBLEM SELECTOR PROBLEM 1
Sepsis with acute hypoxic respiratory failure

## 2022-09-08 NOTE — DISCHARGE NOTE PROVIDER - HOSPITAL COURSE
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 TTE was done to rule out heart failure and was unremarkable. Patient was also found to have leukocytosis with U/A concerning for UTI with significant improvement with IV ceftriaxone. Podiatry was consulted for chronic foot wound and recommended conservative management with outpatient follow up. 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 TTE was done to rule out heart failure and was unremarkable. Patient was also found to have leukocytosis with U/A concerning for UTI with significant improvement with IV ceftriaxone. Podiatry was consulted for chronic foot wound and recommended conservative management with outpatient follow up. Patient was stable for discharge.     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 TTE was done to rule out heart failure and was unremarkable. Patient met sepsis criteria on admission 2/2 increased leukocytosis and tachycardia. U/A concerning for UTI with significant improvement with IV ceftriaxone x5 days due to complicated UTI. RUQ US ruled out cholecystitis despite some RUQ tenderness. Podiatry consulted for chronic foot wound, Xray of L heel wound ruled out osteomyelitis. Pt received hemodialysis 9/5, 9/7, 9/9. Now significantly improved, no SOB, no leukocytosis.    Pt stable and ready for discharge. F/u w/ PCP, nephrologist and podiatrist.

## 2022-09-08 NOTE — PROGRESS NOTE ADULT - SUBJECTIVE AND OBJECTIVE BOX
NASRA PELAYO  41y  MRN: [id number]    Subjective: Pt feels well. Eating and drinking normally. Denies SOB, nausea, vomiting, diarrhea, nausea, constipation.     Patient is a 41y old  Female who presents with a chief complaint of SOB (08 Sep 2022 08:38)      Interval history/overnight events: tele: sinus tachy , O2 sat 91% this morning      MEDICATIONS  (STANDING):  amLODIPine   Tablet 10 milliGRAM(s) Oral daily  apixaban 2.5 milliGRAM(s) Oral every 12 hours  ascorbic acid 500 milliGRAM(s) Oral daily  aspirin enteric coated 81 milliGRAM(s) Oral daily  BACItracin   Ointment 1 Application(s) Topical daily  cefTRIAXone   IVPB 1000 milliGRAM(s) IV Intermittent every 24 hours  chlorhexidine 2% Cloths 1 Application(s) Topical <User Schedule>  cinacalcet 30 milliGRAM(s) Oral daily  clopidogrel Tablet 75 milliGRAM(s) Oral daily  ferrous    sulfate 325 milliGRAM(s) Oral <User Schedule>  folic acid 1 milliGRAM(s) Oral daily  gabapentin 100 milliGRAM(s) Oral at bedtime  influenza   Vaccine 0.5 milliLiter(s) IntraMuscular once  multivitamin 1 Tablet(s) Oral daily  omega-3-Acid Ethyl Esters 1 Gram(s) Oral every 24 hours  sevelamer carbonate 800 milliGRAM(s) Oral three times a day with meals    MEDICATIONS  (PRN):  acetaminophen     Tablet .. 650 milliGRAM(s) Oral every 6 hours PRN Temp greater or equal to 38C (100.4F), Mild Pain (1 - 3), Moderate Pain (4 - 6)  melatonin 5 milliGRAM(s) Oral at bedtime PRN Insomnia        Objective:    Vitals: Vital Signs Last 24 Hrs  T(C): 36.7 (09-08-22 @ 08:00), Max: 37.4 (09-07-22 @ 20:23)  T(F): 98.1 (09-08-22 @ 08:00), Max: 99.3 (09-07-22 @ 20:23)  HR: 107 (09-08-22 @ 08:00) (77 - 156)  BP: 125/78 (09-08-22 @ 08:00) (113/77 - 171/93)  BP(mean): --  RR: 18 (09-08-22 @ 08:00) (18 - 18)  SpO2: 98% (09-08-22 @ 08:00) (91% - 98%)            I&O's Summary    07 Sep 2022 07:01  -  08 Sep 2022 07:00  --------------------------------------------------------  IN: 466 mL / OUT: 2500 mL / NET: -2034 mL        PHYSICAL EXAM:  GENERAL: Pt sitting comfortably eating breakfast  HEENT: PERRL, no scleral icterus, no head and neck lad   CHEST/LUNG: CTAB, no wheezing, crackles, or ronchi   HEART: RRR, normal S1, S2, no murmurs, gallops, or rubs appreciated   ABDOMEN: soft, nondistended, non-tender, normoactive, no HSM, no rebound, no guarding, no rigidity  SKIN: No rashes or lesions  NERVOUS SYSTEM: Alert & Oriented X3  EXT: no peripheral edema, L heel wound wrapped   PSYCH: calm and cooperative     LABS:    09-08    137  |  93<L>  |  22  ----------------------------<  81  3.7   |  24  |  5.35<H>  09-07    141  |  96  |  46<H>  ----------------------------<  99  4.2   |  25  |  8.90<H>  09-06    140  |  97  |  25<H>  ----------------------------<  90  3.8   |  30  |  6.20<H>    Ca    8.5      08 Sep 2022 06:54  Ca    8.2<L>      07 Sep 2022 05:58  Ca    9.0      06 Sep 2022 05:20  Phos  4.7     09-08  Mg     2.4     09-08    TPro  8.1  /  Alb  4.3  /  TBili  0.3  /  DBili  x   /  AST  7<L>  /  ALT  11  /  AlkPhos  226<H>  09-08  TPro  7.3  /  Alb  3.9  /  TBili  0.2  /  DBili  x   /  AST  7<L>  /  ALT  11  /  AlkPhos  200<H>  09-07  TPro  7.2  /  Alb  4.0  /  TBili  0.3  /  DBili  x   /  AST  9<L>  /  ALT  13  /  AlkPhos  208<H>  09-06                                            10.4   9.50  )-----------( 360      ( 08 Sep 2022 06:55 )             32.6                         9.0    8.56  )-----------( 298      ( 07 Sep 2022 05:57 )             27.9                         9.0    7.73  )-----------( 290      ( 06 Sep 2022 05:20 )             28.7     CAPILLARY BLOOD GLUCOSE         NASRA PELAYO  41y  MRN: [id number]    Subjective: Pt feels well. Eating and drinking normally. Denies SOB, nausea, vomiting, diarrhea, nausea, constipation.     Patient is a 41y old  Female who presents with a chief complaint of SOB (08 Sep 2022 08:38)      Interval history/overnight events: tele: sinus tachy , O2 sat 91% this morning      MEDICATIONS  (STANDING):  amLODIPine   Tablet 10 milliGRAM(s) Oral daily  apixaban 2.5 milliGRAM(s) Oral every 12 hours  ascorbic acid 500 milliGRAM(s) Oral daily  aspirin enteric coated 81 milliGRAM(s) Oral daily  BACItracin   Ointment 1 Application(s) Topical daily  cefTRIAXone   IVPB 1000 milliGRAM(s) IV Intermittent every 24 hours  chlorhexidine 2% Cloths 1 Application(s) Topical <User Schedule>  cinacalcet 30 milliGRAM(s) Oral daily  clopidogrel Tablet 75 milliGRAM(s) Oral daily  ferrous    sulfate 325 milliGRAM(s) Oral <User Schedule>  folic acid 1 milliGRAM(s) Oral daily  gabapentin 100 milliGRAM(s) Oral at bedtime  influenza   Vaccine 0.5 milliLiter(s) IntraMuscular once  multivitamin 1 Tablet(s) Oral daily  omega-3-Acid Ethyl Esters 1 Gram(s) Oral every 24 hours  sevelamer carbonate 800 milliGRAM(s) Oral three times a day with meals    MEDICATIONS  (PRN):  acetaminophen     Tablet .. 650 milliGRAM(s) Oral every 6 hours PRN Temp greater or equal to 38C (100.4F), Mild Pain (1 - 3), Moderate Pain (4 - 6)  melatonin 5 milliGRAM(s) Oral at bedtime PRN Insomnia        Objective:    Vitals: Vital Signs Last 24 Hrs  T(C): 36.7 (09-08-22 @ 08:00), Max: 37.4 (09-07-22 @ 20:23)  T(F): 98.1 (09-08-22 @ 08:00), Max: 99.3 (09-07-22 @ 20:23)  HR: 107 (09-08-22 @ 08:00) (77 - 156)  BP: 125/78 (09-08-22 @ 08:00) (113/77 - 171/93)  BP(mean): --  RR: 18 (09-08-22 @ 08:00) (18 - 18)  SpO2: 98% (09-08-22 @ 08:00) (91% - 98%)            I&O's Summary    07 Sep 2022 07:01  -  08 Sep 2022 07:00  --------------------------------------------------------  IN: 466 mL / OUT: 2500 mL / NET: -2034 mL        PHYSICAL EXAM:  GENERAL: Pt sitting comfortably eating breakfast  HEENT: PERRL, no scleral icterus, no head and neck lad   CHEST/LUNG: CTAB, no wheezing, crackles, or ronchi   HEART: RRR, normal S1, S2, no murmurs, gallops, or rubs appreciated   ABDOMEN: soft, nondistended, non-tender, normoactive, no HSM, no rebound, no guarding, no rigidity  SKIN: No rashes or lesions  NERVOUS SYSTEM: Alert & Oriented X3  EXT: no peripheral edema, L heel wound wrapped   PSYCH: calm and cooperative     LABS:    09-08    137  |  93<L>  |  22  ----------------------------<  81  3.7   |  24  |  5.35<H>  09-07    141  |  96  |  46<H>  ----------------------------<  99  4.2   |  25  |  8.90<H>  09-06    140  |  97  |  25<H>  ----------------------------<  90  3.8   |  30  |  6.20<H>    Ca    8.5      08 Sep 2022 06:54  Ca    8.2<L>      07 Sep 2022 05:58  Ca    9.0      06 Sep 2022 05:20  Phos  4.7     09-08  Mg     2.4     09-08    TPro  8.1  /  Alb  4.3  /  TBili  0.3  /  DBili  x   /  AST  7<L>  /  ALT  11  /  AlkPhos  226<H>  09-08  TPro  7.3  /  Alb  3.9  /  TBili  0.2  /  DBili  x   /  AST  7<L>  /  ALT  11  /  AlkPhos  200<H>  09-07  TPro  7.2  /  Alb  4.0  /  TBili  0.3  /  DBili  x   /  AST  9<L>  /  ALT  13  /  AlkPhos  208<H>  09-06                                            10.4   9.50  )-----------( 360      ( 08 Sep 2022 06:55 )             32.6                         9.0    8.56  )-----------( 298      ( 07 Sep 2022 05:57 )             27.9                         9.0    7.73  )-----------( 290      ( 06 Sep 2022 05:20 )             28.7     CAPILLARY BLOOD GLUCOSE        RADIOLOGY       ACC: [id number] EXAM:  XR FOOT COMP MIN 3 VIEWS LT                          PROCEDURE DATE:  09/07/2022          INTERPRETATION:  CLINICAL INDICATION: Left heel wound    TECHNIQUE:  3 views obtained of the left foot.    COMPARISON: No similar prior comparisons available.    FINDINGS:  Plantar posterior heel soft tissue debridement changes with residual skin   surface contour irregularities.There is surrounding tissue edema.  There is no subcutaneous gas or cortical erosion.  The patient is statuspost partial left calcanectomy.  No acute fracture or dislocation.  Hammertoe deformities. Lateral midfoot and hindfoot arthritic changes.  Tarsometatarsal alignment maintained without evidence for a Lisfranc   injury.    IMPRESSION:  No tracking gascollections or evidence of osteomyelitis.  Plantar heel soft tissue debridement changes with residual surface   contour irregularities.    --- End of Report ---                   NASRA PELAYO  41y  MRN: [id number]    Subjective: Pt feels well. Eating and drinking normally. Denies SOB, nausea, vomiting, diarrhea, nausea, constipation.     Patient is a 41y old  Female who presents with a chief complaint of SOB (08 Sep 2022 08:38)      Interval history/overnight events: tele: sinus tachy , O2 sat 91% this morning      MEDICATIONS  (STANDING):  amLODIPine   Tablet 10 milliGRAM(s) Oral daily  apixaban 2.5 milliGRAM(s) Oral every 12 hours  ascorbic acid 500 milliGRAM(s) Oral daily  aspirin enteric coated 81 milliGRAM(s) Oral daily  BACItracin   Ointment 1 Application(s) Topical daily  cefTRIAXone   IVPB 1000 milliGRAM(s) IV Intermittent every 24 hours  chlorhexidine 2% Cloths 1 Application(s) Topical <User Schedule>  cinacalcet 30 milliGRAM(s) Oral daily  clopidogrel Tablet 75 milliGRAM(s) Oral daily  ferrous    sulfate 325 milliGRAM(s) Oral <User Schedule>  folic acid 1 milliGRAM(s) Oral daily  gabapentin 100 milliGRAM(s) Oral at bedtime  influenza   Vaccine 0.5 milliLiter(s) IntraMuscular once  multivitamin 1 Tablet(s) Oral daily  omega-3-Acid Ethyl Esters 1 Gram(s) Oral every 24 hours  sevelamer carbonate 800 milliGRAM(s) Oral three times a day with meals    MEDICATIONS  (PRN):  acetaminophen     Tablet .. 650 milliGRAM(s) Oral every 6 hours PRN Temp greater or equal to 38C (100.4F), Mild Pain (1 - 3), Moderate Pain (4 - 6)  melatonin 5 milliGRAM(s) Oral at bedtime PRN Insomnia        Objective:    Vitals: Vital Signs Last 24 Hrs  T(C): 36.7 (09-08-22 @ 08:00), Max: 37.4 (09-07-22 @ 20:23)  T(F): 98.1 (09-08-22 @ 08:00), Max: 99.3 (09-07-22 @ 20:23)  HR: 107 (09-08-22 @ 08:00) (77 - 156)  BP: 125/78 (09-08-22 @ 08:00) (113/77 - 171/93)  BP(mean): --  RR: 18 (09-08-22 @ 08:00) (18 - 18)  SpO2: 98% (09-08-22 @ 08:00) (91% - 98%)            I&O's Summary    07 Sep 2022 07:01  -  08 Sep 2022 07:00  --------------------------------------------------------  IN: 466 mL / OUT: 2500 mL / NET: -2034 mL        PHYSICAL EXAM:  GENERAL: Pt sitting comfortably eating breakfast  HEENT: PERRL, no scleral icterus, no head and neck lad   CHEST/LUNG: CTAB, no wheezing, crackles, or ronchi   HEART: RRR, normal S1, S2, no murmurs, gallops, or rubs appreciated   ABDOMEN: soft, nondistended, non-tender, normoactive, no rebound, no guarding, no rigidity  SKIN: No rashes or lesions  NERVOUS SYSTEM: Alert & Oriented X3  EXT: no peripheral edema, L heel wound wrapped   PSYCH: calm and cooperative     LABS:    09-08    137  |  93<L>  |  22  ----------------------------<  81  3.7   |  24  |  5.35<H>  09-07    141  |  96  |  46<H>  ----------------------------<  99  4.2   |  25  |  8.90<H>  09-06    140  |  97  |  25<H>  ----------------------------<  90  3.8   |  30  |  6.20<H>    Ca    8.5      08 Sep 2022 06:54  Ca    8.2<L>      07 Sep 2022 05:58  Ca    9.0      06 Sep 2022 05:20  Phos  4.7     09-08  Mg     2.4     09-08    TPro  8.1  /  Alb  4.3  /  TBili  0.3  /  DBili  x   /  AST  7<L>  /  ALT  11  /  AlkPhos  226<H>  09-08  TPro  7.3  /  Alb  3.9  /  TBili  0.2  /  DBili  x   /  AST  7<L>  /  ALT  11  /  AlkPhos  200<H>  09-07  TPro  7.2  /  Alb  4.0  /  TBili  0.3  /  DBili  x   /  AST  9<L>  /  ALT  13  /  AlkPhos  208<H>  09-06                                            10.4   9.50  )-----------( 360      ( 08 Sep 2022 06:55 )             32.6                         9.0    8.56  )-----------( 298      ( 07 Sep 2022 05:57 )             27.9                         9.0    7.73  )-----------( 290      ( 06 Sep 2022 05:20 )             28.7     CAPILLARY BLOOD GLUCOSE        RADIOLOGY       ACC: [id number] EXAM:  XR FOOT COMP MIN 3 VIEWS LT                          PROCEDURE DATE:  09/07/2022          INTERPRETATION:  CLINICAL INDICATION: Left heel wound    TECHNIQUE:  3 views obtained of the left foot.    COMPARISON: No similar prior comparisons available.    FINDINGS:  Plantar posterior heel soft tissue debridement changes with residual skin   surface contour irregularities.There is surrounding tissue edema.  There is no subcutaneous gas or cortical erosion.  The patient is statuspost partial left calcanectomy.  No acute fracture or dislocation.  Hammertoe deformities. Lateral midfoot and hindfoot arthritic changes.  Tarsometatarsal alignment maintained without evidence for a Lisfranc   injury.    IMPRESSION:  No tracking gascollections or evidence of osteomyelitis.  Plantar heel soft tissue debridement changes with residual surface   contour irregularities.    --- End of Report ---

## 2022-09-08 NOTE — PROGRESS NOTE ADULT - PROBLEM SELECTOR PLAN 3
Pt meets criteria based on WBC count, HR, possible source (UTI vs pneumonia vs gallbladder vs cellulitis).  - WBC was 17.42 on admission, no bandemia but procal elevated to 1.01  - WBC now 7.73  - UA grossly positive  - s/p CTX in ED  - c/w CTX x 5 days for suspected UTI. Pt has hx of chronic UTI and comorbid conditions,+ systemic symptoms,  so longer course preferred, can switch to PO upon dc  - on methenamine 1g QD for UTI prophylaxis. Can restart on discharge. Consider adding ascorbic acid to acidify urine and aid function of methenamine  - blood cx shows NGTD  - urine cx grew normal damian  - podiatry consulted appreciate recs  - f/u L foot xray  - per podiatry, no signs of infection of L heel wound Now resolved. Pt met criteria on admission based on WBC count, HR, possible source (UTI vs pneumonia vs gallbladder vs cellulitis).  - WBC was 17.42 on admission, no bandemia but procal elevated to 1.01  - WBC now wnl  - UA on admission grossly positive  - s/p CTX in ED  - c/w CTX x 5 days for suspected UTI. Pt has hx of chronic UTI and comorbid conditions,+ systemic symptoms,  so longer course preferred  -  On methenamine 1g QD for UTI prophylaxis. Can restart on discharge. Consider adding OTC ascorbic acid or cranberry juice to acidify urine and aid function of methenamine  - blood cx shows NGTD  - urine cx grew normal damian  - podiatry consulted appreciate recs  - xray of L foot showed no evidence of osteomyelitis  - per podiatry, no signs of infection of L heel wound Now resolved. Pt met criteria on admission based on WBC count, HR, possible source (UTI vs pneumonia vs gallbladder vs cellulitis).  - WBC was 17.42 on admission, no bandemia but procal elevated to 1.01  - WBC now wnl  - UA on admission grossly positive  - s/p CTX in ED  - c/w CTX x 5 days for suspected UTI.   -  On methenamine 1g QD for UTI prophylaxis. Can restart on discharge. Consider adding OTC ascorbic acid or cranberry juice to acidify urine and aid function of methenamine  - blood cx shows NGTD  - urine cx grew normal damian  - podiatry consulted appreciate recs  - xray of L foot showed no evidence of osteomyelitis  - per podiatry, no signs of infection of L heel wound

## 2022-09-08 NOTE — PROGRESS NOTE ADULT - PROBLEM SELECTOR PLAN 1
·  Problem: Sepsis with acute hypoxic respiratory failure.   ·  Plan: Pt likely has fluid overload in the setting of ESRD after dietary nonadherence 9/4 vs new onset HF  - Markedly improved after dialysis , now satting well at RA with no SOB.   - CXR showed b/l pleural effusions  - EKG normal  - Nephro aware, f/u recs  - Pt's HD schedule is mon wed frid  - Received HD 9/5 upon admission and 9/7  - HF work up: TTE shows EF 64%, normal LV systolic function, normal RV size/function, no significant valvular disease.  - Pt satting well on ambulation, though tachy to 150 ·  Problem: Sepsis with acute hypoxic respiratory failure.   ·  Plan: Pt likely has fluid overload in the setting of ESRD after dietary nonadherence 9/4 vs new onset HF  - Markedly improved after dialysis , now satting well at RA with no SOB  - CXR showed b/l pleural effusions  - EKG normal  - Nephro aware, f/u recs  - Pt's HD schedule is mon wed frid  - Received HD 9/5 upon admission and 9/7  - HF work up: TTE shows EF 64%, normal LV systolic function, normal RV size/function, no significant valvular disease. Pt likely has fluid overload in the setting of ESRD after dietary nonadherence 9/4 vs new onset HF  - Markedly improved after dialysis , now satting well at RA with no SOB  - CXR showed b/l pleural effusions  - EKG normal  - Nephro aware, f/u recs  - Pt's HD schedule is mon wed frid  - Received HD 9/5 upon admission and 9/7  - HF work up: TTE shows EF 64%, normal LV systolic function, normal RV size/function, no significant valvular disease.

## 2022-09-08 NOTE — DISCHARGE NOTE PROVIDER - NSDCCPTREATMENT_GEN_ALL_CORE_FT
PRINCIPAL PROCEDURE  Procedure: Transthoracic echo  Findings and Treatment:   < end of copied text >  Dimensions:    Normal Values:  LA:     3.8    2.0 - 4.0 cm  Ao:     2.7    2.0 - 3.8 cm  SEPTUM: 1.3    0.6 - 1.2 cm  PWT:    1.1    0.6 - 1.1 cm  LVIDd:  4.3    3.0 - 5.6 cm  LVIDs:  2.6    1.8 - 4.0 cm  Derived variables:  LVMI: 106 g/m2  RWT: 0.51  Fractional short: 40 %  EF (Dejesus Rule): 64 %Doppler Peak Velocity (m/sec):  AoV=1.8  < from: Transthoracic Echocardiogram (09.06.22 @ 12:15) >  < end of copied text >  Conclusions:  1. Mild concentric left ventricular hypertrophy.  2. Normal left ventricular systolic function. No segmental  wall motion abnormalities.  3. Normal right ventricular size and function.  4. Trace pericardial effusion.< from: Transthoracic Echocardiogram (09.06.22 @ 12:15) >

## 2022-09-08 NOTE — PROGRESS NOTE ADULT - PROBLEM SELECTOR PLAN 7
History of spina bifida. Walks at home with walker. Has some motor function and sensory function in all extremities but weakness/sensory loss particularly in lower extremities. Straight caths at home due to neurogenic bladder.  - cont to monitor for clinical changes  - PT saw pt, appreciate recs

## 2022-09-08 NOTE — DISCHARGE NOTE PROVIDER - CARE PROVIDERS DIRECT ADDRESSES
[email or web address]

## 2022-09-08 NOTE — PROGRESS NOTE ADULT - ATTENDING COMMENTS
42 yo F with PMHx of spinal bifida,  shunt early in life (last revised at the age of 10), ESRD (s/p renal transplant in 2013 with subsequent rejection ~8 years later, currently on HD via RUE AVF on M/W/F), who presents with acute onset SOB 2/2 pulm edema and found to be septic 2/2 UTI.    # hypoxic respiratory failure 2/2 pulmonary edema, now resolved  - likely 2/2 dietary noncompliance  - markedly improved after dialysis on 9/5, now satting well on RA, no SOB  - CXR 9/5: Bibasilar hazy opacities likely representing atelectasis and/or small   pleural effusion.  - TTE 9/6 shows EF 64%, normal LV systolic function, normal RV size/function, no significant valvular disease    # sepsis  - UA with large leuk esterase and pyuria, however UCx normal urogenic damian  - c/w ceftriaxone (9/5-9/9   ), plan for 5 day course; complicated UTI with systemic manifestations   - RVP + COVID both negative   - UCx negative,  BCx NG    # Elevated alk phos  - Likely secondary to renal dysfunction  - F/u GGT  - RUQ US: Slightly increased echogenic liver likely secondary to fatty infiltration.                       Bile ducts and gallbladder within normal limits. No stones seen.  - negative HepB sAg Neg, Hep B sAb neg, Hep B core Ab negative;   - Hep C Ab negative  - Hep A IgM negative

## 2022-09-08 NOTE — DISCHARGE NOTE PROVIDER - NSDCFUADDAPPT_GEN_ALL_CORE_FT
1. Please be sure to follow up with your PCP  2. Please be sure to follow up with your nephrologist  3. Please take your medications as prescribed 1. Please be sure to follow up with your PCP  2. Please be sure to follow up with your nephrologist  3. Please be sure to follow up with your podiatrist  4. Please take your medications as prescribed

## 2022-09-08 NOTE — PROGRESS NOTE ADULT - SUBJECTIVE AND OBJECTIVE BOX
[facility]   [phone]  RENAL FOLLOW UP NOTE  --------------------------------------------------------------------------------  Chief Complaint:    24 hour events/subjective:    patient seen and examined.   huy dona  states she had headache after HD yesterday    PAST HISTORY  --------------------------------------------------------------------------------  No significant changes to PMH, PSH, FHx, SHx, unless otherwise noted    ALLERGIES & MEDICATIONS  --------------------------------------------------------------------------------  Allergies    latex (Anaphylaxis; Rash)  No Known Drug Allergies    Intolerances      Standing Inpatient Medications  amLODIPine   Tablet 10 milliGRAM(s) Oral daily  apixaban 2.5 milliGRAM(s) Oral every 12 hours  ascorbic acid 500 milliGRAM(s) Oral daily  aspirin enteric coated 81 milliGRAM(s) Oral daily  BACItracin   Ointment 1 Application(s) Topical daily  cefTRIAXone   IVPB 1000 milliGRAM(s) IV Intermittent every 24 hours  chlorhexidine 2% Cloths 1 Application(s) Topical <User Schedule>  cinacalcet 30 milliGRAM(s) Oral daily  clopidogrel Tablet 75 milliGRAM(s) Oral daily  ferrous    sulfate 325 milliGRAM(s) Oral <User Schedule>  folic acid 1 milliGRAM(s) Oral daily  gabapentin 100 milliGRAM(s) Oral at bedtime  influenza   Vaccine 0.5 milliLiter(s) IntraMuscular once  multivitamin 1 Tablet(s) Oral daily  omega-3-Acid Ethyl Esters 1 Gram(s) Oral every 24 hours  sevelamer carbonate 800 milliGRAM(s) Oral three times a day with meals    PRN Inpatient Medications  acetaminophen     Tablet .. 650 milliGRAM(s) Oral every 6 hours PRN  melatonin 5 milliGRAM(s) Oral at bedtime PRN      REVIEW OF SYSTEMS  --------------------------------------------------------------------------------    Gen: denies fevers/chills,  CVS: denies chest pain/palpitations  Resp: denies SOB/Cough  GI: Denies N/V/Abd pain  : Denies dysuria    VITALS/PHYSICAL EXAM  --------------------------------------------------------------------------------  T(C): 36.9 (09-08-22 @ 11:39), Max: 37.4 (09-07-22 @ 20:23)  HR: 110 (09-08-22 @ 11:39) (77 - 156)  BP: 121/80 (09-08-22 @ 11:39) (108/74 - 142/85)  RR: 18 (09-08-22 @ 11:39) (18 - 18)  SpO2: 95% (09-08-22 @ 11:39) (91% - 98%)  Wt(kg): --        09-07-22 @ 07:01  -  09-08-22 @ 07:00  --------------------------------------------------------  IN: 466 mL / OUT: 2500 mL / NET: -2034 mL    09-08-22 @ 07:01  -  09-08-22 @ 13:25  --------------------------------------------------------  IN: 480 mL / OUT: 0 mL / NET: 480 mL      Physical Exam:  	  	Gen: alert oriented place person and date   	Pulm: Decreased breath sounds b/l bases. no rales or ronchi or wheezing  	CV: RRR, S1/S2. no rub  	Abd: +BS, soft, nontender/nondistended  	: No suprapubic tenderness.               Extremity: No cyanosis, no edema no clubbing    LABS/STUDIES  --------------------------------------------------------------------------------              10.4   9.50  >-----------<  360      [09-08-22 @ 06:55]              32.6     137  |  93  |  22  ----------------------------<  81      [09-08-22 @ 06:54]  3.7   |  24  |  5.35        Ca     8.5     [09-08-22 @ 06:54]      Mg     2.4     [09-08-22 @ 06:54]      Phos  4.7     [09-08-22 @ 06:54]    TPro  8.1  /  Alb  4.3  /  TBili  0.3  /  DBili  x   /  AST  7   /  ALT  11  /  AlkPhos  226  [09-08-22 @ 06:54]          Creatinine Trend:  SCr 5.35 [09-08 @ 06:54]  SCr 8.90 [09-07 @ 05:58]  SCr 6.20 [09-06 @ 05:20]  SCr 4.08 [09-05 @ 16:34]  SCr 9.47 [09-05 @ 04:52]              Urinalysis - [09-05-22 @ 05:57]      Color Light Yellow / Appearance Slightly Turbid / SG 1.009 / pH 8.5      Gluc Trace / Ketone Negative  / Bili Negative / Urobili Negative       Blood Negative / Protein 100 mg/dL / Leuk Est Large / Nitrite Negative      RBC 3 /  / Hyaline 8 / Gran  / Sq Epi  / Non Sq Epi 3 / Bacteria Negative      Iron 45, TIBC 154, %sat 29      [09-06-22 @ 05:20]  Ferritin 1298      [09-06-22 @ 05:20]  HbA1c 5.4      [01-22-20 @ 00:04]

## 2022-09-08 NOTE — DISCHARGE NOTE PROVIDER - NSDCMRMEDTOKEN_GEN_ALL_CORE_FT
amLODIPine 10 mg oral tablet: 1 tab(s) orally once a day  Aspirin Enteric Coated 81 mg oral delayed release tablet: 1 tab(s) orally once a day  cinacalcet 30 mg oral tablet: 1 tab(s) orally once a day  clopidogrel 75 mg oral tablet: 1 tab(s) orally once a day  Eliquis 2.5 mg oral tablet: 1 tab(s) orally 2 times a day  ferrous sulfate 325 mg (65 mg elemental iron) oral delayed release tablet: 1 tab(s) orally once a day  Fish Oil 1000 mg oral capsule: 1 cap(s) orally once a day  folic acid 1 mg oral tablet: 1 tab(s) orally once a day  gabapentin 100 mg oral capsule: 1 cap(s) orally once a day (at bedtime)  methenamine hippurate 1 g oral tablet: 1 tab(s) orally once a day  metoprolol succinate 100 mg oral tablet, extended release: 1 tab(s) orally once a day  oxybutynin 15 mg/24 hr oral tablet, extended release: 1 tab(s) orally once a day  Abbie-Ksenia oral tablet: 1 tab(s) orally once a day  sevelamer carbonate 800 mg oral tablet: 1 tab(s) orally 3 times a day (with meals)   acetaminophen 325 mg oral tablet: 2 tab(s) orally every 6 hours, As needed, Temp greater or equal to 38C (100.4F), Mild Pain (1 - 3), Moderate Pain (4 - 6)  amLODIPine 10 mg oral tablet: 1 tab(s) orally once a day  Aspirin Enteric Coated 81 mg oral delayed release tablet: 1 tab(s) orally once a day  C-500-Gr oral tablet: 1 tab(s) orally once a day  cinacalcet 30 mg oral tablet: 1 tab(s) orally once a day  clopidogrel 75 mg oral tablet: 1 tab(s) orally once a day  Eliquis 2.5 mg oral tablet: 1 tab(s) orally 2 times a day  ferrous sulfate 325 mg (65 mg elemental iron) oral tablet: 1 tab(s) orally   Fish Oil 1000 mg oral capsule: 1 cap(s) orally once a day  folic acid 1 mg oral tablet: 1 tab(s) orally once a day  gabapentin 100 mg oral capsule: 1 cap(s) orally once a day (at bedtime)  methenamine hippurate 1 g oral tablet: 1 tab(s) orally once a day  metoprolol succinate 100 mg oral tablet, extended release: 1 tab(s) orally once a day  Physical Therapy: .3  Abbie-Ksenia oral tablet: 1 tab(s) orally once a day  sevelamer carbonate 800 mg oral tablet: 1 tab(s) orally 3 times a day (with meals)   acetaminophen 325 mg oral tablet: 2 tab(s) orally every 6 hours, As needed, Temp greater or equal to 38C (100.4F), Mild Pain (1 - 3), Moderate Pain (4 - 6)  amLODIPine 10 mg oral tablet: 1 tab(s) orally once a day  Aspirin Enteric Coated 81 mg oral delayed release tablet: 1 tab(s) orally once a day  C-500-Gr oral tablet: 1 tab(s) orally once a day  cinacalcet 30 mg oral tablet: 1 tab(s) orally once a day  clopidogrel 75 mg oral tablet: 1 tab(s) orally once a day  Eliquis 2.5 mg oral tablet: 1 tab(s) orally 2 times a day  ferrous sulfate 325 mg (65 mg elemental iron) oral tablet: 1 tab(s) orally every other day  Fish Oil 1000 mg oral capsule: 1 cap(s) orally once a day  folic acid 1 mg oral tablet: 1 tab(s) orally once a day  gabapentin 100 mg oral capsule: 1 cap(s) orally once a day (at bedtime)  methenamine hippurate 1 g oral tablet: 1 tab(s) orally once a day  metoprolol succinate 100 mg oral tablet, extended release: 1 tab(s) orally once a day  Physical Therapy: .3  Abbie-Ksenia oral tablet: 1 tab(s) orally once a day  sevelamer carbonate 800 mg oral tablet: 1 tab(s) orally 3 times a day (with meals)

## 2022-09-08 NOTE — PROGRESS NOTE ADULT - PROBLEM SELECTOR PLAN 4
Chronic wound of L heel  - apply bacitracin to wound daily per routine  - podiatry consulted appreciate recs  - per podiatry, no signs of infection of L heel wound  - f/u L foot xray Chronic wound of L heel  - apply bacitracin to wound daily per routine  - podiatry consulted appreciate recs  - xray of L foot showed no evidence of osteomyelitis  - per podiatry, no signs of infection of L heel wound

## 2022-09-08 NOTE — PROGRESS NOTE ADULT - PROBLEM SELECTOR PLAN 6
Long hx of anemia, likely chronic disease though MCV increased.   - c/w home Iron  - B12 and folate levels wnl  - Check EPO  - f/u TSH  - Continue to trend Hgb, transfuse if <7 Long hx of anemia, likely chronic disease though MCV increased.   - c/w home Iron  - B12 and folate levels wnl  - EPO level 11.9, wnl  - f/u TSH  - Continue to trend Hgb, transfuse if <7

## 2022-09-08 NOTE — DISCHARGE NOTE PROVIDER - CARE PROVIDER_API CALL
John Neely)  Internal Medicine  [address] [place] [address]  Phone: [phone]  Fax: [phone]  Follow Up Time:     GOLDBERG, ALLA  Internal Medicine  [address] [place] [address]  Phone: ()-  Fax: ()-  Follow Up Time:

## 2022-09-08 NOTE — PROGRESS NOTE ADULT - PROBLEM SELECTOR PLAN 2
- On admission patient has RUQ tenderness, elevated alkaline phosphatase  - RUQ US 9/6 showed fatty liver, no gallstones, atrophic R kidney (no hydro)  - negative HepB sAg Neg, Hep B sAb neg, Hep B core Ab negative;   - Hep C Ab negative  - Hep A eval neg  - f/u GGT - On admission patient has RUQ tenderness, elevated alkaline phosphatase  - RUQ US 9/6 showed fatty liver, no gallstones, atrophic R kidney (no hydro)  - negative HepB sAg Neg, Hep B sAb neg, Hep B core Ab negative   - Hep C Ab negative  - Hep A eval neg  - f/u GGT

## 2022-09-08 NOTE — PROGRESS NOTE ADULT - PROBLEM SELECTOR PLAN 5
Pt likely has fluid overload after exceeding her fluid restriction 9/4 vs new onset HF. Previous transplant in 2013 but progressed to ESRD given chronic obstructive nephropathy secondary to neurogenic bladder w/ PMH of spina bifida. Straight caths at home  - CXR showed findings consistent with pulmonary edema   - EKG normal  - Nephro aware, f/u final recs  - Per nephro, c/w home revela and cinacalcet  - Pt's HD schedule is mon wed frid  - Received HD 9/5 upon admission  - Will receive HD today 9/7  - Hyperkalemia, pts K was 6.1 on admission, now resolved  - s/p 10mg PO lokelma in ED  - Repeat labs Pt likely has fluid overload after exceeding her fluid restriction 9/4 vs new onset HF. Previous transplant in 2013 but progressed to ESRD given chronic obstructive nephropathy secondary to neurogenic bladder w/ PMH of spina bifida. Straight caths at home  - CXR on admission showed findings consistent with pulmonary edema   - EKG normal  - Nephro aware, f/u final recs  - Per nephro, c/w home revela and cinacalcet  - Pt's HD schedule is mon wed frid  - Received HD 9/5 upon admission and 9/7  - Hyperkalemia, pts K was 6.1 on admission, now resolved  - s/p 10mg PO lokelma in ED  - Repeat labs  - hx of inappropriate tachycardia (confirmed by tele, pt's HR has been ), will restart home metoprolol Pt likely has fluid overload after exceeding her fluid restriction 9/4 vs new onset HF. Previous transplant in 2013 but progressed to ESRD given chronic obstructive nephropathy secondary to neurogenic bladder w/ PMH of spina bifida. Straight caths at home  - CXR on admission showed findings consistent with pulmonary edema   - EKG normal  - Nephro aware, f/u final recs  - Per nephro, c/w home revela and cinacalcet  - Pt's HD schedule is mon wed frid  - Received HD 9/5 upon admission and 9/7  - Hyperkalemia, pts K was 6.1 on admission, now resolved  - s/p 10mg PO lokelma in ED  - Repeat labs

## 2022-09-08 NOTE — CHART NOTE - NSCHARTNOTEFT_GEN_A_CORE
Pt left foot x-rays reviewed with no signs of emphysema or osteomyelitis. Pt is stable for discharge from the podiatry standpoint.

## 2022-09-08 NOTE — DISCHARGE NOTE PROVIDER - ATTENDING DISCHARGE PHYSICAL EXAMINATION:
40 yo F with PMHx of spinal bifida,  shunt early in life (last revised at the age of 10), ESRD (s/p renal transplant in 2013 with subsequent rejection ~8 years later, currently on HD via RUE AVF on M/W/F), who presented with acute onset SOB 2/2 pulm edema and found to be septic 2/2 UTI.    # hypoxic respiratory failure 2/2 pulmonary edema, now resolved  - likely 2/2 dietary noncompliance  - markedly improved after dialysis on 9/5. has been satting well on RA, no dyspnea. On exam, no rales/rhonchi  - CXR 9/5: Bibasilar hazy opacities likely representing atelectasis and/or small   pleural effusion.  - TTE 9/6 shows EF 64%, normal LV systolic function, normal RV size/function, no significant valvular disease    # sepsis  - UA with large leuk esterase and pyuria, however Urine culture with normal urogenital damian  - finished 5 day ceftriaxone course  - RVP and COVID pcr both negative   - Blood cx: NGTD    # Elevated alk phos  - Likely secondary to renal dysfunction  - GGT 32  - RUQ US: Slightly increased echogenic liver likely secondary to fatty infiltration. Bile ducts and gallbladder within normal limits. No stones seen.  - HepB sAg Neg, Hep B sAb neg, Hep B core Ab negative; Hep C Ab negative. Hep A IgM negative .    Discharge planning 40mins

## 2022-09-09 ENCOUNTER — TRANSCRIPTION ENCOUNTER (OUTPATIENT)
Age: 42
End: 2022-09-09

## 2022-09-09 VITALS
SYSTOLIC BLOOD PRESSURE: 109 MMHG | OXYGEN SATURATION: 98 % | RESPIRATION RATE: 18 BRPM | HEART RATE: 87 BPM | TEMPERATURE: 99 F | DIASTOLIC BLOOD PRESSURE: 73 MMHG

## 2022-09-09 LAB
ANION GAP SERPL CALC-SCNC: 22 MMOL/L — HIGH (ref 5–17)
BUN SERPL-MCNC: 48 MG/DL — HIGH (ref 7–23)
CALCIUM SERPL-MCNC: 8.4 MG/DL — SIGNIFICANT CHANGE UP (ref 8.4–10.5)
CHLORIDE SERPL-SCNC: 94 MMOL/L — LOW (ref 96–108)
CO2 SERPL-SCNC: 21 MMOL/L — LOW (ref 22–31)
CREAT SERPL-MCNC: 8.33 MG/DL — HIGH (ref 0.5–1.3)
EGFR: 6 ML/MIN/1.73M2 — LOW
GLUCOSE SERPL-MCNC: 75 MG/DL — SIGNIFICANT CHANGE UP (ref 70–99)
HCT VFR BLD CALC: 32 % — LOW (ref 34.5–45)
HGB BLD-MCNC: 10.2 G/DL — LOW (ref 11.5–15.5)
MAGNESIUM SERPL-MCNC: 2.8 MG/DL — HIGH (ref 1.6–2.6)
MCHC RBC-ENTMCNC: 31.9 GM/DL — LOW (ref 32–36)
MCHC RBC-ENTMCNC: 31.9 PG — SIGNIFICANT CHANGE UP (ref 27–34)
MCV RBC AUTO: 100 FL — SIGNIFICANT CHANGE UP (ref 80–100)
NRBC # BLD: 0 /100 WBCS — SIGNIFICANT CHANGE UP (ref 0–0)
PHOSPHATE SERPL-MCNC: 5.8 MG/DL — HIGH (ref 2.5–4.5)
PLATELET # BLD AUTO: 334 K/UL — SIGNIFICANT CHANGE UP (ref 150–400)
POTASSIUM SERPL-MCNC: 4.1 MMOL/L — SIGNIFICANT CHANGE UP (ref 3.5–5.3)
POTASSIUM SERPL-SCNC: 4.1 MMOL/L — SIGNIFICANT CHANGE UP (ref 3.5–5.3)
RBC # BLD: 3.2 M/UL — LOW (ref 3.8–5.2)
RBC # FLD: 13.5 % — SIGNIFICANT CHANGE UP (ref 10.3–14.5)
SODIUM SERPL-SCNC: 137 MMOL/L — SIGNIFICANT CHANGE UP (ref 135–145)
WBC # BLD: 10.18 K/UL — SIGNIFICANT CHANGE UP (ref 3.8–10.5)
WBC # FLD AUTO: 10.18 K/UL — SIGNIFICANT CHANGE UP (ref 3.8–10.5)

## 2022-09-09 PROCEDURE — 82310 ASSAY OF CALCIUM: CPT

## 2022-09-09 PROCEDURE — 73630 X-RAY EXAM OF FOOT: CPT

## 2022-09-09 PROCEDURE — 82330 ASSAY OF CALCIUM: CPT

## 2022-09-09 PROCEDURE — 83880 ASSAY OF NATRIURETIC PEPTIDE: CPT

## 2022-09-09 PROCEDURE — 84295 ASSAY OF SERUM SODIUM: CPT

## 2022-09-09 PROCEDURE — 87637 SARSCOV2&INF A&B&RSV AMP PRB: CPT

## 2022-09-09 PROCEDURE — 82607 VITAMIN B-12: CPT

## 2022-09-09 PROCEDURE — 87641 MR-STAPH DNA AMP PROBE: CPT

## 2022-09-09 PROCEDURE — 84443 ASSAY THYROID STIM HORMONE: CPT

## 2022-09-09 PROCEDURE — 36415 COLL VENOUS BLD VENIPUNCTURE: CPT

## 2022-09-09 PROCEDURE — 85014 HEMATOCRIT: CPT

## 2022-09-09 PROCEDURE — 84484 ASSAY OF TROPONIN QUANT: CPT

## 2022-09-09 PROCEDURE — 85027 COMPLETE CBC AUTOMATED: CPT

## 2022-09-09 PROCEDURE — 85018 HEMOGLOBIN: CPT

## 2022-09-09 PROCEDURE — 80053 COMPREHEN METABOLIC PANEL: CPT

## 2022-09-09 PROCEDURE — 84100 ASSAY OF PHOSPHORUS: CPT

## 2022-09-09 PROCEDURE — 87040 BLOOD CULTURE FOR BACTERIA: CPT

## 2022-09-09 PROCEDURE — 84132 ASSAY OF SERUM POTASSIUM: CPT

## 2022-09-09 PROCEDURE — 82947 ASSAY GLUCOSE BLOOD QUANT: CPT

## 2022-09-09 PROCEDURE — 85025 COMPLETE CBC W/AUTO DIFF WBC: CPT

## 2022-09-09 PROCEDURE — 86706 HEP B SURFACE ANTIBODY: CPT

## 2022-09-09 PROCEDURE — 99285 EMERGENCY DEPT VISIT HI MDM: CPT

## 2022-09-09 PROCEDURE — 86704 HEP B CORE ANTIBODY TOTAL: CPT

## 2022-09-09 PROCEDURE — 86709 HEPATITIS A IGM ANTIBODY: CPT

## 2022-09-09 PROCEDURE — 87640 STAPH A DNA AMP PROBE: CPT

## 2022-09-09 PROCEDURE — 87086 URINE CULTURE/COLONY COUNT: CPT

## 2022-09-09 PROCEDURE — 71045 X-RAY EXAM CHEST 1 VIEW: CPT

## 2022-09-09 PROCEDURE — 83605 ASSAY OF LACTIC ACID: CPT

## 2022-09-09 PROCEDURE — 99261: CPT

## 2022-09-09 PROCEDURE — 83540 ASSAY OF IRON: CPT

## 2022-09-09 PROCEDURE — 84145 PROCALCITONIN (PCT): CPT

## 2022-09-09 PROCEDURE — 82803 BLOOD GASES ANY COMBINATION: CPT

## 2022-09-09 PROCEDURE — 93306 TTE W/DOPPLER COMPLETE: CPT

## 2022-09-09 PROCEDURE — 84466 ASSAY OF TRANSFERRIN: CPT

## 2022-09-09 PROCEDURE — 87340 HEPATITIS B SURFACE AG IA: CPT

## 2022-09-09 PROCEDURE — 82435 ASSAY OF BLOOD CHLORIDE: CPT

## 2022-09-09 PROCEDURE — 97162 PT EVAL MOD COMPLEX 30 MIN: CPT

## 2022-09-09 PROCEDURE — 83735 ASSAY OF MAGNESIUM: CPT

## 2022-09-09 PROCEDURE — 81001 URINALYSIS AUTO W/SCOPE: CPT

## 2022-09-09 PROCEDURE — 99239 HOSP IP/OBS DSCHRG MGMT >30: CPT | Mod: GC

## 2022-09-09 PROCEDURE — 83550 IRON BINDING TEST: CPT

## 2022-09-09 PROCEDURE — 86803 HEPATITIS C AB TEST: CPT

## 2022-09-09 PROCEDURE — 94640 AIRWAY INHALATION TREATMENT: CPT

## 2022-09-09 PROCEDURE — 76705 ECHO EXAM OF ABDOMEN: CPT

## 2022-09-09 PROCEDURE — 83970 ASSAY OF PARATHORMONE: CPT

## 2022-09-09 PROCEDURE — 82668 ASSAY OF ERYTHROPOIETIN: CPT

## 2022-09-09 PROCEDURE — 82977 ASSAY OF GGT: CPT

## 2022-09-09 PROCEDURE — 82728 ASSAY OF FERRITIN: CPT

## 2022-09-09 PROCEDURE — 82746 ASSAY OF FOLIC ACID SERUM: CPT

## 2022-09-09 PROCEDURE — 80048 BASIC METABOLIC PNL TOTAL CA: CPT

## 2022-09-09 RX ADMIN — Medication 650 MILLIGRAM(S): at 11:45

## 2022-09-09 RX ADMIN — Medication 1 APPLICATION(S): at 14:15

## 2022-09-09 RX ADMIN — APIXABAN 2.5 MILLIGRAM(S): 2.5 TABLET, FILM COATED ORAL at 17:23

## 2022-09-09 RX ADMIN — CEFTRIAXONE 100 MILLIGRAM(S): 500 INJECTION, POWDER, FOR SOLUTION INTRAMUSCULAR; INTRAVENOUS at 05:58

## 2022-09-09 RX ADMIN — Medication 650 MILLIGRAM(S): at 11:28

## 2022-09-09 RX ADMIN — SEVELAMER CARBONATE 800 MILLIGRAM(S): 2400 POWDER, FOR SUSPENSION ORAL at 17:23

## 2022-09-09 RX ADMIN — SEVELAMER CARBONATE 800 MILLIGRAM(S): 2400 POWDER, FOR SUSPENSION ORAL at 08:29

## 2022-09-09 RX ADMIN — CLOPIDOGREL BISULFATE 75 MILLIGRAM(S): 75 TABLET, FILM COATED ORAL at 14:17

## 2022-09-09 RX ADMIN — Medication 1 TABLET(S): at 14:17

## 2022-09-09 RX ADMIN — Medication 100 MILLIGRAM(S): at 06:00

## 2022-09-09 RX ADMIN — Medication 1 MILLIGRAM(S): at 14:17

## 2022-09-09 RX ADMIN — CINACALCET 30 MILLIGRAM(S): 30 TABLET, FILM COATED ORAL at 14:17

## 2022-09-09 RX ADMIN — Medication 500 MILLIGRAM(S): at 14:17

## 2022-09-09 RX ADMIN — APIXABAN 2.5 MILLIGRAM(S): 2.5 TABLET, FILM COATED ORAL at 05:58

## 2022-09-09 RX ADMIN — Medication 1 GRAM(S): at 17:23

## 2022-09-09 RX ADMIN — AMLODIPINE BESYLATE 10 MILLIGRAM(S): 2.5 TABLET ORAL at 05:58

## 2022-09-09 RX ADMIN — Medication 81 MILLIGRAM(S): at 14:17

## 2022-09-09 NOTE — DISCHARGE NOTE NURSING/CASE MANAGEMENT/SOCIAL WORK - NSDCFUADDAPPT_GEN_ALL_CORE_FT
1. Please be sure to follow up with your PCP  2. Please be sure to follow up with your nephrologist  3. Please be sure to follow up with your podiatrist  4. Please take your medications as prescribed

## 2022-09-09 NOTE — DISCHARGE NOTE NURSING/CASE MANAGEMENT/SOCIAL WORK - PATIENT PORTAL LINK FT
You can access the FollowMyHealth Patient Portal offered by [facility] by registering at the following website: [email or web address]. By joining [employer]’s FollowMyHealth portal, you will also be able to view your health information using other applications (apps) compatible with our system.

## 2022-09-09 NOTE — PROGRESS NOTE ADULT - NS ATTEND AMEND GEN_ALL_CORE FT
lungs decrease bs no rales   ext no edema    htn bp is better cont anti htn meds  hd am   fluid status  is better
seen on hd   heart RRR  lungs decrease bs no rales    esrd   hd as planned above   see hd flow sheet

## 2022-09-09 NOTE — DISCHARGE NOTE NURSING/CASE MANAGEMENT/SOCIAL WORK - NSDCPEFALRISK_GEN_ALL_CORE
For information on Fall & Injury Prevention, visit: [email or web address] OR  [email or web address] OR  https://www.cdc.gov/steadi/patient.html

## 2022-09-09 NOTE — PROGRESS NOTE ADULT - ASSESSMENT
42 y/o F w/ PMH of spinal bifida, VPS early in life (last revised at the age of 10), ESRD on HD with RUE graft/stent and  failed renal transplant  presents with acute onset SOB.  In the ED, pt received albuterol neb and O2 by NC and SOB improved. She also received 10mg PO lokelma for K elevated to 6.1 and empiric CTX for WBC 17.42. CXR showed b/l small pleural effusions.     1- ESRD  2- CHF   3- HTN   4- anemia   5- hyperkalemia     hd 4 hours bfr 350 cc dfr 600 2.5 liter .   cont norvasc  trend hb  epogen 42979 u   low k renal diet   see hd flow sheet   
41F PMH Spina Bifida, hydrocephalus s/p L plantar heel wound bedside debridement (9/8).  - Pt seen and evaluated.  - Afebrile, no leukocytosis.  - L plantar heel wound to subQ with fibrogranular wound bed, no clinical signs of infection, no probe to bone, no malodor, and no erythema. R foot with no wounds or signs of infection.   - L foot xray: No gas, no osteomyelitis.  - Daily care with betadine paint, 4x4 gauze, ABD pads, and isaac.  - Z flows to be worn at all times while in house.   - Pt has a hx of partial calcanectomy on L foot, has seen podiatrist Dr. Lopez, goes every 2 weeks.  - No podiatric surgical intervention at this time, podiatry plan for local wound care.  - Stable for d/c from podiatry standpoint.   - Follow up w/ outside pod or w/ Dr. Young at the wound care center ([address], [address]) within the week, call [phone] for appointment.  - Seen w/ attending.
42 y/o F w/ PMH of spinal bifida, VPS early in life (last revised at the age of 10), ESRD on HD with RUE graft/stent and  failed renal transplant  presents with acute onset SOB.  In the ED, pt received albuterol neb and O2 by NC and SOB improved. She also received 10mg PO lokelma for K elevated to 6.1 and empiric CTX for WBC 17.42. CXR showed b/l small pleural effusions.       1- ESRD  2- CHF   3- HTN   4- anemia   5- hyperkalemia       HD today   F180, 240 min, , , 2L fluid removal  see HD flowsheet  standing weight post HD  cont norvasc 10 mg daily  trend hb, epogen 16655 units with HD   low k renal diet   renvela 1 tab with meals  sensipar 30 mg daily  discharge planning per primary team
42 y/o F w/ PMH of spinal bifida, VPS early in life (last revised at the age of 10), ESRD on HD with RUE graft/stent and  failed renal transplant  presents with acute onset SOB.  In the ED, pt received albuterol neb and O2 by NC and SOB improved. She also received 10mg PO lokelma for K elevated to 6.1 and empiric CTX for WBC 17.42. CXR showed b/l small pleural effusions.     1- ESRD  2- CHF   3- HTN   4- anemia   5- hyperkalemia       plan for HD tomorrow  norvasc 10 mg daily post HD  trend hb   Renal diet  renvela 1 tab with meals  cinacalcet 30 mg daily   check intact pth  leukocytosis improved   ceftriaxone 1G daily   blood cx pending   echo pending   liver US pending 
40 y/o F w/ PMH of spinal bifida, VPS early in life (last revised at the age of 10), ESRD on HD with RUE graft/stent and renal transplant in 2013 presents with acute onset SOB after exceeding her fluid restriction the day prior. CXR showed b/l pleural effusions, labs revealed hyperkalemia, leukocytosis. Pt likely has fluid overload in the setting of esrd vs new onset HF, admitted for workup.             
42 y/o F w/ PMH of spinal bifida, VPS early in life (last revised at the age of 10), ESRD on HD with RUE graft/stent and  failed renal transplant  presents with acute onset SOB.  In the ED, pt received albuterol neb and O2 by NC and SOB improved. She also received 10mg PO lokelma for K elevated to 6.1 and empiric CTX for WBC 17.42. CXR showed b/l small pleural effusions.       1- ESRD  2- CHF   3- HTN   4- anemia   5- hyperkalemia       Plan for HD 1st shift tomorrow  cont norvasc 10 mg daily  trend hb, epogen 29377 units with HD   low k renal diet   renvela 1 tab with meals  sensipar 30 mg daily
42 y/o F w/ PMH of spinal bifida, VPS early in life (last revised at the age of 10), ESRD on HD with RUE graft/stent and renal transplant in 2013 presents with acute onset SOB after exceeding her fluid restriction the day prior. CXR showed b/l pleural effusions, labs revealed hyperkalemia, leukocytosis. Pt likely has fluid overload in the setting of esrd vs new onset HF, admitted for workup.             
40 y/o F w/ PMH of spinal bifida, VPS early in life (last revised at the age of 10), ESRD on HD with RUE graft/stent and renal transplant in 2013 presents with acute onset SOB after exceeding her fluid restriction the day prior. CXR showed b/l pleural effusions, labs revealed hyperkalemia, leukocytosis. Pt likely has fluid overload in the setting of esrd vs new onset HF, admitted for workup.

## 2022-09-09 NOTE — PROGRESS NOTE ADULT - SUBJECTIVE AND OBJECTIVE BOX
[facility]   [phone]  RENAL FOLLOW UP NOTE  --------------------------------------------------------------------------------  Chief Complaint:    24 hour events/subjective:    patient seen and examined on HD  denies headaches, sob    PAST HISTORY  --------------------------------------------------------------------------------  No significant changes to PMH, PSH, FHx, SHx, unless otherwise noted    ALLERGIES & MEDICATIONS  --------------------------------------------------------------------------------  Allergies    latex (Anaphylaxis; Rash)  No Known Drug Allergies    Intolerances      Standing Inpatient Medications  amLODIPine   Tablet 10 milliGRAM(s) Oral daily  apixaban 2.5 milliGRAM(s) Oral every 12 hours  ascorbic acid 500 milliGRAM(s) Oral daily  aspirin enteric coated 81 milliGRAM(s) Oral daily  BACItracin   Ointment 1 Application(s) Topical daily  cefTRIAXone   IVPB 1000 milliGRAM(s) IV Intermittent every 24 hours  chlorhexidine 2% Cloths 1 Application(s) Topical <User Schedule>  cinacalcet 30 milliGRAM(s) Oral daily  clopidogrel Tablet 75 milliGRAM(s) Oral daily  ferrous    sulfate 325 milliGRAM(s) Oral <User Schedule>  folic acid 1 milliGRAM(s) Oral daily  gabapentin 100 milliGRAM(s) Oral at bedtime  influenza   Vaccine 0.5 milliLiter(s) IntraMuscular once  metoprolol succinate  milliGRAM(s) Oral daily  multivitamin 1 Tablet(s) Oral daily  omega-3-Acid Ethyl Esters 1 Gram(s) Oral every 24 hours  sevelamer carbonate 800 milliGRAM(s) Oral three times a day with meals    PRN Inpatient Medications  acetaminophen     Tablet .. 650 milliGRAM(s) Oral every 6 hours PRN  melatonin 5 milliGRAM(s) Oral at bedtime PRN      REVIEW OF SYSTEMS  --------------------------------------------------------------------------------    Gen: denies fevers/chills,  CVS: denies chest pain/palpitations  Resp: denies SOB/Cough  GI: Denies N/V/Abd pain  : Denies dysuria    VITALS/PHYSICAL EXAM  --------------------------------------------------------------------------------  T(C): 36.6 (09-09-22 @ 09:32), Max: 36.9 (09-08-22 @ 19:57)  HR: 91 (09-09-22 @ 09:32) (87 - 103)  BP: 147/74 (09-09-22 @ 09:32) (119/66 - 147/74)  RR: 18 (09-09-22 @ 09:32) (18 - 18)  SpO2: 97% (09-09-22 @ 09:32) (95% - 97%)  Wt(kg): --        09-08-22 @ 07:01  -  09-09-22 @ 07:00  --------------------------------------------------------  IN: 480 mL / OUT: 0 mL / NET: 480 mL      Physical Exam:  	  	Gen: alert oriented place person and date   	Pulm: Decreased breath sounds b/l bases. no rales or ronchi or wheezing  	CV: RRR, S1/S2. no rub  	Abd: +BS, soft, nontender/nondistended  	: No suprapubic tenderness.               Extremity: No cyanosis, no edema no clubbing    LABS/STUDIES  --------------------------------------------------------------------------------              10.2   10.18 >-----------<  334      [09-09-22 @ 06:13]              32.0     137  |  94  |  48  ----------------------------<  75      [09-09-22 @ 06:13]  4.1   |  21  |  8.33        Ca     8.4     [09-09-22 @ 06:13]      Mg     2.8     [09-09-22 @ 06:13]      Phos  5.8     [09-09-22 @ 06:13]    TPro  8.1  /  Alb  4.3  /  TBili  0.3  /  DBili  x   /  AST  7   /  ALT  11  /  AlkPhos  226  [09-08-22 @ 06:54]          Creatinine Trend:  SCr 8.33 [09-09 @ 06:13]  SCr 5.35 [09-08 @ 06:54]  SCr 8.90 [09-07 @ 05:58]  SCr 6.20 [09-06 @ 05:20]  SCr 4.08 [09-05 @ 16:34]              Urinalysis - [09-05-22 @ 05:57]      Color Light Yellow / Appearance Slightly Turbid / SG 1.009 / pH 8.5      Gluc Trace / Ketone Negative  / Bili Negative / Urobili Negative       Blood Negative / Protein 100 mg/dL / Leuk Est Large / Nitrite Negative      RBC 3 /  / Hyaline 8 / Gran  / Sq Epi  / Non Sq Epi 3 / Bacteria Negative      Iron 45, TIBC 154, %sat 29      [09-06-22 @ 05:20]  Ferritin 1298      [09-06-22 @ 05:20]  PTH -- (Ca 8.2)      [09-07-22 @ 05:55]   1239  HbA1c 5.4      [01-22-20 @ 00:04]  TSH 1.66      [09-08-22 @ 07:30]

## 2022-09-09 NOTE — PROGRESS NOTE ADULT - SUBJECTIVE AND OBJECTIVE BOX
Patient is a 41y old  Female who presents with a chief complaint of SOB (08 Sep 2022 13:24)       INTERVAL HPI/OVERNIGHT EVENTS:  Patient seen and evaluated at bedside.  Pt is resting comfortable in NAD. Denies N/V/F/C.    Allergies    latex (Anaphylaxis; Rash)  No Known Drug Allergies    Intolerances        Vital Signs Last 24 Hrs  T(C): 36.6 (09 Sep 2022 09:32), Max: 36.9 (08 Sep 2022 11:39)  T(F): 97.9 (09 Sep 2022 09:32), Max: 98.4 (08 Sep 2022 11:39)  HR: 91 (09 Sep 2022 09:32) (87 - 111)  BP: 147/74 (09 Sep 2022 09:32) (108/74 - 147/74)  BP(mean): --  RR: 18 (09 Sep 2022 09:32) (18 - 18)  SpO2: 97% (09 Sep 2022 09:32) (95% - 97%)    Parameters below as of 09 Sep 2022 09:32  Patient On (Oxygen Delivery Method): room air        LABS:                        10.2   10.18 )-----------( 334      ( 09 Sep 2022 06:13 )             32.0     09-09    137  |  94<L>  |  48<H>  ----------------------------<  75  4.1   |  21<L>  |  8.33<H>    Ca    8.4      09 Sep 2022 06:13  Phos  5.8     09-09  Mg     2.8     09-09    TPro  8.1  /  Alb  4.3  /  TBili  0.3  /  DBili  x   /  AST  7<L>  /  ALT  11  /  AlkPhos  226<H>  09-08        CAPILLARY BLOOD GLUCOSE          Lower Extremity Physical Exam:  Vascular: DP/PT 1/4 B/L, CFT <3 seconds B/L, Temperature gradient warm to cool B/L  Neuro: Epicritic sensation diminished to the level of ankle B/L  Musculoskeletal/Ortho: calcaneal gait 2/2 spina bifida  Skin: s/p left foot bedside plantar heel debridement (9/8) ,L plantar heel wound to subQ with fibrogranular wound bed, no clinical signs of infection, no probe to bone, no malodor, and no erythema. R foot with no wounds or signs of infection.   RADIOLOGY & ADDITIONAL TESTS:

## 2022-09-09 NOTE — PROGRESS NOTE ADULT - PROVIDER SPECIALTY LIST ADULT
Nephrology
Nephrology
Internal Medicine
Nephrology
Nephrology
Podiatry
Internal Medicine
Internal Medicine

## 2022-09-10 LAB
CULTURE RESULTS: SIGNIFICANT CHANGE UP
CULTURE RESULTS: SIGNIFICANT CHANGE UP
SPECIMEN SOURCE: SIGNIFICANT CHANGE UP
SPECIMEN SOURCE: SIGNIFICANT CHANGE UP

## 2023-01-25 ENCOUNTER — APPOINTMENT (OUTPATIENT)
Dept: PULMONOLOGY | Facility: CLINIC | Age: 43
End: 2023-01-25
Payer: COMMERCIAL

## 2023-01-25 VITALS
OXYGEN SATURATION: 98 % | SYSTOLIC BLOOD PRESSURE: 135 MMHG | RESPIRATION RATE: 15 BRPM | DIASTOLIC BLOOD PRESSURE: 81 MMHG | TEMPERATURE: 97.7 F | HEART RATE: 83 BPM

## 2023-01-25 DIAGNOSIS — G47.21 CIRCADIAN RHYTHM SLEEP DISORDER, DELAYED SLEEP PHASE TYPE: ICD-10-CM

## 2023-01-25 PROCEDURE — 99204 OFFICE O/P NEW MOD 45 MIN: CPT

## 2023-01-25 NOTE — ASSESSMENT
[FreeTextEntry1] : 42 year old female here for evaluation of obstructive sleep apnea. She has a history of severe IZABELLA was previously intolerant of CPAP therapy. She continues to have symptoms of sleep apnea including snoring and daytime sleepiness. A split-study was ordered to evaluate for sleep disordered breathing as well as possible leg movements that could be interrupting her sleep. Her sleep initiation issues are likely related to delayed phased sleep-wake circadian rhythm disorder. Advised to delay bedtime until 12 AM and maintain the same awakening time, even on weekends. Advised to discontinue melatonin. Follow up after the sleep study.\par

## 2023-01-25 NOTE — HISTORY OF PRESENT ILLNESS
[Obstructive Sleep Apnea] : obstructive sleep apnea [Snoring] : snoring [Witnessed Apneas] : witnessed sleep apnea [Frequent Nocturnal Awakening] : frequent nocturnal awakening [Unintentional Sleep While Inactive] : unintentional sleep while inactive [Awakes Unrefreshed] : awakening unrefreshed [Awakes with Headache] : headache upon awakening [Awakening With Dry Mouth] : awakening with dry mouth [Daytime Somnolence] : daytime somnolence [Delayed Sleep Phase Syndrome] : delayed sleep phase syndrome [Lower Extremity Discomfort] : lower extremity discomfort in evening or at bedtime [To Bed: ___] : ~he/she~ goes to bed at [unfilled] [Arises: ___] : arises at [unfilled] [Sleep Onset Latency: ___ minutes] : sleep onset latency of [unfilled] minutes reported [Nocturnal Awakenings: ___] : ~he/she~ typically has [unfilled] nocturnal awakenings [FreeTextEntry1] : This is a 42 year-old female referred for evaluation and management of obstructive sleep apnea.\par \par Comorbid conditions include chronic kidney disease s/p living donor transplant 3/13/2013 that failed, currently on dialysis, spina bifida s/p  shunt, hypertension, and asthma.\par \par Patient has a prior diagnosis of IZABELLA, reportedly severe, first diagnosed during evaluation for living donor kidney transplant in 2012. She used CPAP with a full face interface that she stopped using after 1-2 years as she felt the pressure was too high. She also reports sleep onset issues and uses melatonin 10 mg but is ineffective. On MWF, she receives dialysis and gets to bed at around 11 PM. On other nights, she gets to bed at around 9:30 PM but it takes her on average 1-2 hours to fall asleep. Once asleep, she reports nocturnal awakenings about 3x/night of unclear etiology. She also reports spasms in her legs but does not have the urge to move her legs during the night.\par \par ESS: 15\par \par  [Recent  Weight Gain] : no recent weight gain [ESS] : 15

## 2023-01-25 NOTE — CONSULT LETTER
[Dear  ___] : Dear  [unfilled], [Consult Letter:] : I had the pleasure of evaluating your patient, [unfilled]. [Please see my note below.] : Please see my note below. [Consult Closing:] : Thank you very much for allowing me to participate in the care of this patient.  If you have any questions, please do not hesitate to contact me. [Sincerely,] : Sincerely, [FreeTextEntry3] : Anupama Padilla MD\par Pulmonary, Critical Care, and Sleep Medicine\par Associate Medical Director, [facility]\par  of Medicine\par [employer] at [facility], [place]

## 2023-01-25 NOTE — REVIEW OF SYSTEMS
[Fatigue] : fatigue [Snoring] : snoring [Witnessed Apneas] : witnessed apnea [A.M. Dry Mouth] : a.m. dry mouth [Difficulty Initiating Sleep] : difficulty falling asleep [Difficulty Maintaining Sleep] : difficulty maintaining sleep [Lower Extremity Discomfort] : lower extremity discomfort [Late day/ Evening symptoms] : late day/evening symptoms [EDS: ESS=____] : daytime somnolence: ESS=[unfilled] [Recent Wt Gain (___ Lbs)] : no recent weight gain [Chest Pain] : no chest pain

## 2023-01-25 NOTE — REASON FOR VISIT
[Parent] : parent [Sleep Apnea] : sleep apnea [Consultation] : a consultation visit [FreeTextEntry1] : Referred by Dr. Neely

## 2023-01-25 NOTE — PHYSICAL EXAM
[Well Groomed] : well groomed [General Appearance - In No Acute Distress] : no acute distress [IV] : IV [Heart Rate And Rhythm] : heart rate was normal and rhythm regular [Heart Sounds] : normal S1 and S2 [] : no respiratory distress [Respiration, Rhythm And Depth] : normal respiratory rhythm and effort [Exaggerated Use Of Accessory Muscles For Inspiration] : no accessory muscle use [Auscultation Breath Sounds / Voice Sounds] : lungs were clear to auscultation bilaterally [Normal Conjunctiva] : the conjunctiva exhibited no abnormalities [Neck Appearance] : the appearance of the neck was normal [Apical Impulse] : the apical impulse was normal [FreeTextEntry1] : In wheelchair [Skin Color & Pigmentation] : normal skin color and pigmentation [Oriented To Time, Place, And Person] : oriented to person, place, and time [Impaired Insight] : insight and judgment were intact [Affect] : the affect was normal

## 2023-04-05 ENCOUNTER — FORM ENCOUNTER (OUTPATIENT)
Age: 43
End: 2023-04-05

## 2023-04-06 ENCOUNTER — OUTPATIENT (OUTPATIENT)
Dept: OUTPATIENT SERVICES | Facility: HOSPITAL | Age: 43
LOS: 1 days | End: 2023-04-06
Payer: COMMERCIAL

## 2023-04-06 ENCOUNTER — APPOINTMENT (OUTPATIENT)
Dept: SLEEP CENTER | Facility: CLINIC | Age: 43
End: 2023-04-06
Payer: COMMERCIAL

## 2023-04-06 DIAGNOSIS — Z98.2 PRESENCE OF CEREBROSPINAL FLUID DRAINAGE DEVICE: Chronic | ICD-10-CM

## 2023-04-06 DIAGNOSIS — Z94.0 KIDNEY TRANSPLANT STATUS: Chronic | ICD-10-CM

## 2023-04-06 PROCEDURE — 95811 POLYSOM 6/>YRS CPAP 4/> PARM: CPT

## 2023-04-06 PROCEDURE — 95811 POLYSOM 6/>YRS CPAP 4/> PARM: CPT | Mod: 26

## 2023-04-18 ENCOUNTER — APPOINTMENT (OUTPATIENT)
Dept: PULMONOLOGY | Facility: CLINIC | Age: 43
End: 2023-04-18
Payer: COMMERCIAL

## 2023-04-18 PROCEDURE — 99441: CPT

## 2023-04-20 DIAGNOSIS — G47.33 OBSTRUCTIVE SLEEP APNEA (ADULT) (PEDIATRIC): ICD-10-CM

## 2023-06-21 ENCOUNTER — APPOINTMENT (OUTPATIENT)
Dept: PULMONOLOGY | Facility: CLINIC | Age: 43
End: 2023-06-21

## 2023-11-09 ENCOUNTER — APPOINTMENT (OUTPATIENT)
Dept: PULMONOLOGY | Facility: CLINIC | Age: 43
End: 2023-11-09

## 2023-11-17 ENCOUNTER — APPOINTMENT (OUTPATIENT)
Dept: GASTROENTEROLOGY | Facility: CLINIC | Age: 43
End: 2023-11-17

## 2023-12-13 NOTE — ED PROVIDER NOTE - WR ORDER STATUS 1
December 13, 2023      Kevin Perez  [address] [id number]        Dear MsAnnmarieg,    This letter is to remind you that you are due for your follow-up Pap smear and Human Papillomavirus (HPV) test.    Please call [phone] to schedule your appointment at your earliest convenience.    If you have completed the appointment outside of the [facility] system, please have the records forwarded to our office. We will update your chart for your provider to review before your next annual wellness visit.     Thank you for choosing [facility]!      Sincerely,    Your [facility] Care Team  
Resulted

## 2024-02-01 ENCOUNTER — APPOINTMENT (OUTPATIENT)
Dept: PULMONOLOGY | Facility: CLINIC | Age: 44
End: 2024-02-01
Payer: COMMERCIAL

## 2024-02-01 ENCOUNTER — APPOINTMENT (OUTPATIENT)
Dept: PULMONOLOGY | Facility: CLINIC | Age: 44
End: 2024-02-01

## 2024-02-01 VITALS
SYSTOLIC BLOOD PRESSURE: 113 MMHG | HEIGHT: 65 IN | DIASTOLIC BLOOD PRESSURE: 73 MMHG | BODY MASS INDEX: 26.99 KG/M2 | TEMPERATURE: 97.1 F | RESPIRATION RATE: 16 BRPM | OXYGEN SATURATION: 97 % | HEART RATE: 87 BPM | WEIGHT: 162 LBS

## 2024-02-01 DIAGNOSIS — Z94.0 KIDNEY TRANSPLANT STATUS: ICD-10-CM

## 2024-02-01 DIAGNOSIS — G47.33 OBSTRUCTIVE SLEEP APNEA (ADULT) (PEDIATRIC): ICD-10-CM

## 2024-02-01 PROCEDURE — 99215 OFFICE O/P EST HI 40 MIN: CPT

## 2024-02-01 NOTE — PROCEDURE
[FreeTextEntry1] : Maskfitting Patient is currently using a Respironics Dreamwear nasal mask, size small. Patient was fit with a ResMed Airfit N30i nasal mask, size SW.  NASRA PELAYO was comfortable with the fit.

## 2024-02-01 NOTE — ASSESSMENT
[FreeTextEntry1] : 44 y/o F with h/o CKD s/p living donor transplant 3/13/2013 that failed, underwent another kidney transplant 10/2023 at [facility], spina bifida, hydrocephalus s/p  shunt, HTN, Asthma, presents for management of Severe IZABELLA--AHI 36.4/hr. The patient was using CPAP at 11 cmH20 until she had the kidney transplant in October. Patient required frequent awakenings (every 2-hours) to self cath during that time for a few weeks. Insurance is not approving a renewal of pap supplies due not non-compliance from that time and advised patient to requalify for pap therapy. Patient had derived benefit from CPAP when she used it with latest compliance from October showing normalization of AHI to 0.4/hr. Currently, without being able to use CPAP without new supplies, the patient is symptomatic with snoring, severely fragmented, non-restorative sleep, AM headaches and dry mouth.  > 20 lb weight loss from last sleep study after kidney transplant.   The ramifications of IZABELLA, including its correlation with CKD and the importance of continued treatment given she is a kidney transplant recipient, was discussed with the patient and mother at length.  As insurance dictates, will order expedited SPLIT SLEEP STUDY to requalify for pap supplies. Given > 20-lb weight loss from last sleep study, will determine current severity of apneas and optimal pressure requirements.  In the interim, patient underwent a mask fitting in our office today to receive a new mask, headgear to resume treatment on CPAP for her symptoms until she undergoes the new sleep study and receives supplies approved through insurance. Patient verbalized understanding on proper cleaning of equipment and supplies.  The patient was advised to not use her CPAP for 2-nights prior to the new SPLIT SLEEP STUDY and the rationalization for this was explained to the patient and mother.    F/U after SPLIT SLEEP STUDY

## 2024-02-01 NOTE — HISTORY OF PRESENT ILLNESS
[Snoring] : snoring [Awakes with Headache] : headache upon awakening [Awakening With Dry Mouth] : awakening with dry mouth [DIS] : DIS [DMS] : DMS [To Bed: ___] : ~he/she~ goes to bed at [unfilled] [Arises: ___] : arises at [unfilled] [Sleep Onset Latency: ___ minutes] : sleep onset latency of [unfilled] minutes reported [Nocturnal Awakenings: ___] : ~he/she~ typically has [unfilled] nocturnal awakenings [WASO: ___] : Wake time after sleep onset is [unfilled] [TST: ___] : Total sleep time is [unfilled] [Daytime Sleep: ___] : daytime sleep: [unfilled] [Frequent Nocturnal Awakening] : frequent nocturnal awakening [FreeTextEntry1] : 42 y/o F with h/o CKD s/p living donor transplant 3/13/2013 that failed, underwent another kidney transplant 10/2023 at [facility], spina bifida, hydrocephalus s/p  shunt, HTN, Asthma, presents for management of Severe IZABELLA--AHI 36.4/hr. The patient was using CPAP at 11 cmH20 until she had the kidney transplant in October. She states she was unable to use the CPAP consistently after the transplant as she required self-cath every 2-hours for a few weeks during that time. The patient states insurance is not approving renewal of supplies due to non-compliance, and she was advised to requalify for pap therapy. With CPAP, the patient states she slept better and woke up refreshed. The nasal pillow mask and pressure were tolerated well.    Patient had > 20-lb weight loss from her last sleep study since undergoing kidney transplant. Currently, without being able to use CPAP, she endorses snoring, severely fragmented, non-restorative sleep, AM headaches, and AM dry mouth. Though she feels tired throughout the day, she denies unintentional sleep episodes. Additional sleep related symptoms and sleep schedule is as indicated below.   SLEEP STUDY: SPLIT SLEEP STUDY 4-6-2023: AHI 36.4/hr, T 90 5.6 %. Optimal pressure of 11 cmH20. Weighed 185 lbs.           current sx: feels tired but no unintentinal dozing   of note, patient was initially diagnosed with IZABELLA in 2012 while undergoing evaluation for living donor kidney transplant. She had used CPAP with a FFM interface that she stopped after 1-2 years as she felt the pressures were too high.   [Unintentional Sleep while Active] : no unintentional sleep while active [Unintentional Sleep While Inactive] : no unintentional sleep while inactive [Awakes Unrefreshed] : does not awake unrefreshed [Recent  Weight Gain] : no recent weight gain [Daytime Somnolence] : no daytime somnolence [Unusual Sleep Behavior] : no unusual sleep behavior [Cataplexy] : no cataplexy [Sleep Paralysis] : no sleep paralysis [Hypnagogic Hallucinations] : no hallucinations when falling asleep [Hypnopompic Hallucinations] : no hallucinations when awakening

## 2024-02-01 NOTE — PHYSICAL EXAM
[Normal Appearance] : normal appearance [Normal Conjunctiva] : the conjunctiva exhibited no abnormalities [General Appearance - In No Acute Distress] : no acute distress [Low Lying Soft Palate] : low lying soft palate [IV] : IV [Neck Appearance] : the appearance of the neck was normal [Heart Rate And Rhythm] : heart rate was normal and rhythm regular [Heart Sounds] : normal S1 and S2 [] : no respiratory distress [Respiration, Rhythm And Depth] : normal respiratory rhythm and effort [Exaggerated Use Of Accessory Muscles For Inspiration] : no accessory muscle use [Auscultation Breath Sounds / Voice Sounds] : lungs were clear to auscultation bilaterally [Involuntary Movements] : no involuntary movements were seen [Cyanosis, Localized] : no localized cyanosis [No Focal Deficits] : no focal deficits [Oriented To Time, Place, And Person] : oriented to person, place, and time [Impaired Insight] : insight and judgment were intact [Affect] : the affect was normal [Mood] : the mood was normal

## 2024-02-01 NOTE — REVIEW OF SYSTEMS
[Fatigue] : fatigue [Recent Wt Loss (___ Lbs)] : recent [unfilled] ~Ulb weight loss [Snoring] : snoring [A.M. Dry Mouth] : a.m. dry mouth [A.M. Headache] : headache present upon awakening [Negative] : Psychiatric [Chest Pain] : no chest pain [CHF] : no congestive heart failure [Thyroid Disease] : no thyroid disease [Diabetes] : no diabetes  [FreeTextEntry5] : self caths every 4-hours

## 2024-02-07 ENCOUNTER — TRANSCRIPTION ENCOUNTER (OUTPATIENT)
Age: 44
End: 2024-02-07

## 2024-02-09 ENCOUNTER — TRANSCRIPTION ENCOUNTER (OUTPATIENT)
Age: 44
End: 2024-02-09

## 2024-03-02 ENCOUNTER — APPOINTMENT (OUTPATIENT)
Dept: SLEEP CENTER | Facility: CLINIC | Age: 44
End: 2024-03-02
Payer: COMMERCIAL

## 2024-03-02 ENCOUNTER — OUTPATIENT (OUTPATIENT)
Dept: OUTPATIENT SERVICES | Facility: HOSPITAL | Age: 44
LOS: 1 days | End: 2024-03-02
Payer: COMMERCIAL

## 2024-03-02 DIAGNOSIS — Z98.2 PRESENCE OF CEREBROSPINAL FLUID DRAINAGE DEVICE: Chronic | ICD-10-CM

## 2024-03-02 DIAGNOSIS — Z94.0 KIDNEY TRANSPLANT STATUS: Chronic | ICD-10-CM

## 2024-03-02 PROCEDURE — 95811 POLYSOM 6/>YRS CPAP 4/> PARM: CPT | Mod: 26

## 2024-03-02 PROCEDURE — 95811 POLYSOM 6/>YRS CPAP 4/> PARM: CPT

## 2024-03-07 DIAGNOSIS — G47.33 OBSTRUCTIVE SLEEP APNEA (ADULT) (PEDIATRIC): ICD-10-CM

## 2024-03-19 ENCOUNTER — TRANSCRIPTION ENCOUNTER (OUTPATIENT)
Age: 44
End: 2024-03-19

## 2024-03-19 ENCOUNTER — NON-APPOINTMENT (OUTPATIENT)
Age: 44
End: 2024-03-19

## 2024-03-20 ENCOUNTER — APPOINTMENT (OUTPATIENT)
Dept: PULMONOLOGY | Facility: CLINIC | Age: 44
End: 2024-03-20
Payer: MEDICARE

## 2024-03-20 PROCEDURE — 99442: CPT | Mod: 93

## 2024-03-25 ENCOUNTER — TRANSCRIPTION ENCOUNTER (OUTPATIENT)
Age: 44
End: 2024-03-25

## 2024-03-27 ENCOUNTER — TRANSCRIPTION ENCOUNTER (OUTPATIENT)
Age: 44
End: 2024-03-27

## 2024-05-01 ENCOUNTER — APPOINTMENT (OUTPATIENT)
Dept: PULMONOLOGY | Facility: CLINIC | Age: 44
End: 2024-05-01
Payer: MEDICARE

## 2024-05-01 DIAGNOSIS — G47.33 OBSTRUCTIVE SLEEP APNEA (ADULT) (PEDIATRIC): ICD-10-CM

## 2024-05-01 PROCEDURE — 99213 OFFICE O/P EST LOW 20 MIN: CPT

## 2024-05-01 NOTE — PHYSICAL EXAM
[General Appearance - Well Developed] : well developed [Normal Appearance] : normal appearance [Normal Conjunctiva] : the conjunctiva exhibited no abnormalities [Skin Color & Pigmentation] : normal skin color and pigmentation [Oriented To Time, Place, And Person] : oriented to person, place, and time [Mood] : the mood was normal

## 2024-05-01 NOTE — HISTORY OF PRESENT ILLNESS
[To Bed: ___] : ~he/she~ goes to bed at [unfilled] [Arises: ___] : arises at [unfilled] [Sleep Onset Latency: ___ minutes] : sleep onset latency of [unfilled] minutes reported [Nocturnal Awakenings: ___] : ~he/she~ typically has [unfilled] nocturnal awakenings [WASO: ___] : Wake time after sleep onset is [unfilled] [FreeTextEntry1] : Ms. Loving is 43-year-old female with moderate IZABELLA on CPAP therapy who presents via video for follow up.  PMHx: h/o CKD s/p living donor transplant 3/13/2013 that failed, underwent another kidney transplant 10/2023 at [facility], spina bifida, hydrocephalus s/p  shunt, HTN, Asthma.  3/2/2024 Split Sleep Study: Moderate IZABELLA--AHI 25.7/hr, RDI 36.3/hr, with a T 90 of 0.8%. Optimal pressure of 6 cmH20 using a Resmed P30i nasal pillow interface, size Medium.  TX: Airsense 11 Autoset setup on 5/4/2023 by DME: [facility]  Patient reports she is compliant with CPAP therapy and feels benefit. She states sleep apnea symptoms resolved with CPAP therapy. Patient reports dry mouth, she is not sure if it is from medications she takes.  Patient is tolerating mask and pressure well. She is currently using nasal pillow mask.

## 2024-05-01 NOTE — ASSESSMENT
[FreeTextEntry1] : Ms. Loving is 43-year-old female with moderate IZABELLA on CPAP therapy who presents via video for follow up.  PMHx: h/o CKD s/p living donor transplant 3/13/2013 that failed, underwent another kidney transplant 10/2023 at [facility], spina bifida, hydrocephalus s/p  shunt, HTN, Asthma.  Discussed with patient CPAP compliance data: Compliant 80% of days, 77% for >4-hrs, average 7hrs 49mins. AHI 0.6/hr Leaks 14.4L/m. Patient is deriving benfit from CPAP therapy.  PAP resupply to continue therapy at the current settings. Annual follow-up, sooner if needed.

## 2024-05-01 NOTE — REASON FOR VISIT
[Home] : at home, [unfilled] , at the time of the visit. [Medical Office: ([facility])___] : at the medical office located in  [Patient] : the patient [Follow-Up] : a follow-up visit [Sleep Apnea] : sleep apnea

## 2024-07-11 ENCOUNTER — TRANSCRIPTION ENCOUNTER (OUTPATIENT)
Age: 44
End: 2024-07-11

## 2024-08-13 ENCOUNTER — NON-APPOINTMENT (OUTPATIENT)
Age: 44
End: 2024-08-13

## 2024-08-13 ENCOUNTER — TRANSCRIPTION ENCOUNTER (OUTPATIENT)
Age: 44
End: 2024-08-13

## 2024-08-30 ENCOUNTER — APPOINTMENT (OUTPATIENT)
Dept: GASTROENTEROLOGY | Facility: CLINIC | Age: 44
End: 2024-08-30

## 2024-10-03 ENCOUNTER — TRANSCRIPTION ENCOUNTER (OUTPATIENT)
Age: 44
End: 2024-10-03

## 2024-10-04 ENCOUNTER — TRANSCRIPTION ENCOUNTER (OUTPATIENT)
Age: 44
End: 2024-10-04

## 2024-10-14 NOTE — OCCUPATIONAL THERAPY INITIAL EVALUATION ADULT - RANGE OF MOTION EXAMINATION, LOWER EXTREMITY
Quality 130: Documentation Of Current Medications In The Medical Record: Current Medications Documented Detail Level: Detailed Quality 226: Preventive Care And Screening: Tobacco Use: Screening And Cessation Intervention: Patient screened for tobacco use and is an ex/non-smoker except B/L ankles WLF/bilateral LE Active ROM was WNL (within normal limits)

## 2024-11-14 ENCOUNTER — NON-APPOINTMENT (OUTPATIENT)
Age: 44
End: 2024-11-14

## 2025-02-03 ENCOUNTER — APPOINTMENT (OUTPATIENT)
Dept: NEUROLOGY | Facility: CLINIC | Age: 45
End: 2025-02-03

## 2025-03-28 ENCOUNTER — TRANSCRIPTION ENCOUNTER (OUTPATIENT)
Age: 45
End: 2025-03-28

## 2025-04-02 ENCOUNTER — TRANSCRIPTION ENCOUNTER (OUTPATIENT)
Age: 45
End: 2025-04-02

## 2025-04-04 ENCOUNTER — TRANSCRIPTION ENCOUNTER (OUTPATIENT)
Age: 45
End: 2025-04-04

## 2025-04-26 ENCOUNTER — NON-APPOINTMENT (OUTPATIENT)
Age: 45
End: 2025-04-26

## 2025-04-28 ENCOUNTER — APPOINTMENT (OUTPATIENT)
Dept: NEUROLOGY | Facility: CLINIC | Age: 45
End: 2025-04-28

## 2025-05-15 ENCOUNTER — TRANSCRIPTION ENCOUNTER (OUTPATIENT)
Age: 45
End: 2025-05-15

## 2025-05-31 ENCOUNTER — TRANSCRIPTION ENCOUNTER (OUTPATIENT)
Age: 45
End: 2025-05-31

## 2025-07-09 ENCOUNTER — NON-APPOINTMENT (OUTPATIENT)
Age: 45
End: 2025-07-09

## 2025-07-14 ENCOUNTER — APPOINTMENT (OUTPATIENT)
Dept: NEUROLOGY | Facility: CLINIC | Age: 45
End: 2025-07-14

## 2025-07-14 ENCOUNTER — LABORATORY RESULT (OUTPATIENT)
Age: 45
End: 2025-07-14

## 2025-07-14 ENCOUNTER — NON-APPOINTMENT (OUTPATIENT)
Age: 45
End: 2025-07-14

## 2025-07-14 ENCOUNTER — APPOINTMENT (OUTPATIENT)
Dept: NEUROLOGY | Facility: CLINIC | Age: 45
End: 2025-07-14
Payer: MEDICARE

## 2025-07-14 VITALS
SYSTOLIC BLOOD PRESSURE: 121 MMHG | OXYGEN SATURATION: 99 % | HEIGHT: 65 IN | WEIGHT: 180 LBS | HEART RATE: 88 BPM | BODY MASS INDEX: 29.99 KG/M2 | DIASTOLIC BLOOD PRESSURE: 78 MMHG | TEMPERATURE: 98 F

## 2025-07-14 PROBLEM — G44.59 COMPLICATED HEADACHE SYNDROMES: Status: ACTIVE | Noted: 2025-07-14

## 2025-07-14 PROBLEM — G56.01 CARPAL TUNNEL SYNDROME OF RIGHT WRIST: Status: ACTIVE | Noted: 2025-07-14

## 2025-07-14 PROBLEM — G56.02 CARPAL TUNNEL SYNDROME OF LEFT WRIST: Status: ACTIVE | Noted: 2025-07-14

## 2025-07-14 PROBLEM — G93.2 BENIGN INTRACRANIAL HYPERTENSION: Status: ACTIVE | Noted: 2025-07-14

## 2025-07-14 PROCEDURE — 99204 OFFICE O/P NEW MOD 45 MIN: CPT

## 2025-07-14 RX ORDER — RIZATRIPTAN BENZOATE 10 MG/1
10 TABLET ORAL
Qty: 1 | Refills: 5 | Status: ACTIVE | COMMUNITY
Start: 2025-07-14 | End: 1900-01-01

## 2025-07-14 RX ORDER — GABAPENTIN 100 MG
100 TABLET ORAL
Refills: 0 | Status: ACTIVE | COMMUNITY

## 2025-07-14 RX ORDER — ASCORBIC ACID 500 MG
500 WAFER ORAL
Refills: 0 | Status: ACTIVE | COMMUNITY

## 2025-07-14 RX ORDER — OXYBUTYNIN CHLORIDE 10 MG/1
10 TABLET, EXTENDED RELEASE ORAL
Refills: 0 | Status: ACTIVE | COMMUNITY

## 2025-07-14 RX ORDER — MYCOPHENOLATE MOFETIL 500 MG/1
500 TABLET, FILM COATED ORAL
Refills: 0 | Status: ACTIVE | COMMUNITY

## 2025-07-14 RX ORDER — SULFAMETHOXAZOLE AND TRIMETHOPRIM 400; 80 MG/1; MG/1
400-80 TABLET ORAL
Refills: 0 | Status: ACTIVE | COMMUNITY

## 2025-07-14 RX ORDER — CALCIUM CARB/MAGNESIUM OXID/D3 400MG-133
400-166.7-133.3 TABLET ORAL
Refills: 0 | Status: ACTIVE | COMMUNITY

## 2025-07-14 RX ORDER — AMLODIPINE BESYLATE 5 MG/1
5 TABLET ORAL
Refills: 0 | Status: ACTIVE | COMMUNITY

## 2025-07-14 RX ORDER — VERAPAMIL HYDROCHLORIDE 180 MG/1
180 CAPSULE, DELAYED RELEASE ORAL
Qty: 90 | Refills: 3 | Status: ACTIVE | COMMUNITY
Start: 2025-07-14 | End: 1900-01-01

## 2025-07-14 RX ORDER — CHOLECALCIFEROL (VITAMIN D3) 1250 MCG
1.25 MG CAPSULE ORAL
Refills: 0 | Status: ACTIVE | COMMUNITY

## 2025-07-14 RX ORDER — METHENAMINE HIPPURATE 1 G/1
1 TABLET ORAL
Refills: 0 | Status: ACTIVE | COMMUNITY

## 2025-07-14 RX ORDER — CINACALCET 60 MG/1
60 TABLET ORAL
Refills: 0 | Status: ACTIVE | COMMUNITY

## 2025-07-14 RX ORDER — TACROLIMUS 1 MG/1
1 CAPSULE, GELATIN COATED ORAL
Refills: 0 | Status: ACTIVE | COMMUNITY

## 2025-07-14 RX ORDER — PREDNISONE 5 MG/1
5 TABLET ORAL
Refills: 0 | Status: ACTIVE | COMMUNITY

## 2025-07-14 RX ORDER — INSULIN LISPRO 100 [IU]/ML
100 INJECTION, SOLUTION INTRAVENOUS; SUBCUTANEOUS
Refills: 0 | Status: ACTIVE | COMMUNITY

## 2025-07-14 RX ORDER — METOPROLOL TARTRATE 50 MG/1
50 TABLET ORAL
Refills: 0 | Status: ACTIVE | COMMUNITY

## 2025-07-15 LAB
ALBUMIN SERPL ELPH-MCNC: 4.1 G/DL
ALP BLD-CCNC: 172 U/L
ALT SERPL-CCNC: 19 U/L
ANION GAP SERPL CALC-SCNC: 14 MMOL/L
AST SERPL-CCNC: 13 U/L
BILIRUB SERPL-MCNC: 0.3 MG/DL
BUN SERPL-MCNC: 11 MG/DL
CALCIUM SERPL-MCNC: 9.4 MG/DL
CHLORIDE SERPL-SCNC: 105 MMOL/L
CO2 SERPL-SCNC: 20 MMOL/L
CREAT SERPL-MCNC: 0.53 MG/DL
EGFRCR SERPLBLD CKD-EPI 2021: 115 ML/MIN/1.73M2
ERYTHROCYTE [SEDIMENTATION RATE] IN BLOOD BY WESTERGREN METHOD: 45 MM/HR
GLUCOSE SERPL-MCNC: 112 MG/DL
HCT VFR BLD CALC: 39.7 %
HGB BLD-MCNC: 12.3 G/DL
MCHC RBC-ENTMCNC: 28.9 PG
MCHC RBC-ENTMCNC: 31 G/DL
MCV RBC AUTO: 93.2 FL
PLATELET # BLD AUTO: 336 K/UL
POTASSIUM SERPL-SCNC: 4.6 MMOL/L
PROT SERPL-MCNC: 6.6 G/DL
RBC # BLD: 4.26 M/UL
RBC # FLD: 13.2 %
SODIUM SERPL-SCNC: 140 MMOL/L
TSH SERPL-ACNC: 1.47 UIU/ML
WBC # FLD AUTO: 6.95 K/UL

## 2025-07-18 LAB — ANACR T: NEGATIVE

## 2025-07-22 ENCOUNTER — APPOINTMENT (OUTPATIENT)
Dept: MRI IMAGING | Facility: CLINIC | Age: 45
End: 2025-07-22
Payer: MEDICARE

## 2025-07-22 PROCEDURE — 70551 MRI BRAIN STEM W/O DYE: CPT

## 2025-07-24 NOTE — DIETITIAN INITIAL EVALUATION ADULT. - WEIGHT CHANGE
Pharmacy said they need a new rx sent over for the 7/8 vyvanse refill.  There is some problem with the 7/8 rx in their system   no

## 2025-07-31 NOTE — PROGRESS NOTE ADULT - ASSESSMENT
normal appearance , no lymphadenopathy, without tenderness upon palpation , no deformities , trachea midline , thyroid normal size , no masses , thyroid nontender VPS malfunction    monitor for 2/2 hydrocephalus  cont cangrelor drip  SQH  OR plan per neurosurgery  renal diet  HD M/W/F, renal following  bowel regimen  monitor for fevers

## 2025-08-05 ENCOUNTER — TRANSCRIPTION ENCOUNTER (OUTPATIENT)
Age: 45
End: 2025-08-05

## 2025-08-08 ENCOUNTER — APPOINTMENT (OUTPATIENT)
Dept: GASTROENTEROLOGY | Facility: CLINIC | Age: 45
End: 2025-08-08
Payer: MEDICARE

## 2025-08-08 VITALS
WEIGHT: 182 LBS | OXYGEN SATURATION: 97 % | TEMPERATURE: 98.1 F | HEIGHT: 65 IN | HEART RATE: 81 BPM | BODY MASS INDEX: 30.32 KG/M2 | SYSTOLIC BLOOD PRESSURE: 117 MMHG | DIASTOLIC BLOOD PRESSURE: 70 MMHG

## 2025-08-08 DIAGNOSIS — Z12.11 ENCOUNTER FOR SCREENING FOR MALIGNANT NEOPLASM OF COLON: ICD-10-CM

## 2025-08-08 DIAGNOSIS — R11.2 NAUSEA WITH VOMITING, UNSPECIFIED: ICD-10-CM

## 2025-08-08 DIAGNOSIS — R10.13 EPIGASTRIC PAIN: ICD-10-CM

## 2025-08-08 DIAGNOSIS — R19.8 OTHER SPECIFIED SYMPTOMS AND SIGNS INVOLVING THE DIGESTIVE SYSTEM AND ABDOMEN: ICD-10-CM

## 2025-08-08 DIAGNOSIS — R42 DIZZINESS AND GIDDINESS: ICD-10-CM

## 2025-08-08 DIAGNOSIS — R10.84 GENERALIZED ABDOMINAL PAIN: ICD-10-CM

## 2025-08-08 DIAGNOSIS — R51.9 HEADACHE, UNSPECIFIED: ICD-10-CM

## 2025-08-08 DIAGNOSIS — R68.89 OTHER GENERAL SYMPTOMS AND SIGNS: ICD-10-CM

## 2025-08-08 PROCEDURE — 99204 OFFICE O/P NEW MOD 45 MIN: CPT

## 2025-08-08 RX ORDER — AMLODIPINE BESYLATE 5 MG/1
5 TABLET ORAL
Refills: 0 | Status: ACTIVE | COMMUNITY

## 2025-08-08 RX ORDER — SIMETHICONE 125 MG/1
125 TABLET, CHEWABLE ORAL
Qty: 120 | Refills: 3 | Status: ACTIVE | COMMUNITY
Start: 2025-08-08 | End: 1900-01-01

## 2025-08-08 RX ORDER — POLYETHYLENE GLYCOL 3350 AND ELECTROLYTES WITH LEMON FLAVOR 236; 22.74; 6.74; 5.86; 2.97 G/4L; G/4L; G/4L; G/4L; G/4L
236 POWDER, FOR SOLUTION ORAL
Qty: 1 | Refills: 0 | Status: ACTIVE | COMMUNITY
Start: 2025-08-08 | End: 1900-01-01